# Patient Record
Sex: FEMALE | Race: WHITE | HISPANIC OR LATINO | Employment: UNEMPLOYED | URBAN - METROPOLITAN AREA
[De-identification: names, ages, dates, MRNs, and addresses within clinical notes are randomized per-mention and may not be internally consistent; named-entity substitution may affect disease eponyms.]

---

## 2022-03-23 ENCOUNTER — OFFICE VISIT (OUTPATIENT)
Dept: OBGYN CLINIC | Facility: CLINIC | Age: 32
End: 2022-03-23

## 2022-03-23 ENCOUNTER — APPOINTMENT (OUTPATIENT)
Dept: LAB | Facility: CLINIC | Age: 32
End: 2022-03-23
Payer: COMMERCIAL

## 2022-03-23 VITALS — SYSTOLIC BLOOD PRESSURE: 144 MMHG | DIASTOLIC BLOOD PRESSURE: 90 MMHG | HEART RATE: 80 BPM | WEIGHT: 250.6 LBS

## 2022-03-23 DIAGNOSIS — Z01.419 ENCOUNTER FOR GYNECOLOGICAL EXAMINATION (GENERAL) (ROUTINE) WITHOUT ABNORMAL FINDINGS: Primary | ICD-10-CM

## 2022-03-23 DIAGNOSIS — Z20.2 POSSIBLE EXPOSURE TO STD: ICD-10-CM

## 2022-03-23 DIAGNOSIS — Z12.4 CERVICAL CANCER SCREENING: ICD-10-CM

## 2022-03-23 LAB — HCV AB SER QL: NORMAL

## 2022-03-23 PROCEDURE — 86592 SYPHILIS TEST NON-TREP QUAL: CPT

## 2022-03-23 PROCEDURE — 86803 HEPATITIS C AB TEST: CPT

## 2022-03-23 PROCEDURE — 87491 CHLMYD TRACH DNA AMP PROBE: CPT | Performed by: OBSTETRICS & GYNECOLOGY

## 2022-03-23 PROCEDURE — G0476 HPV COMBO ASSAY CA SCREEN: HCPCS | Performed by: OBSTETRICS & GYNECOLOGY

## 2022-03-23 PROCEDURE — 87389 HIV-1 AG W/HIV-1&-2 AB AG IA: CPT

## 2022-03-23 PROCEDURE — 99395 PREV VISIT EST AGE 18-39: CPT | Performed by: OBSTETRICS & GYNECOLOGY

## 2022-03-23 PROCEDURE — 36415 COLL VENOUS BLD VENIPUNCTURE: CPT

## 2022-03-23 PROCEDURE — 87591 N.GONORRHOEAE DNA AMP PROB: CPT | Performed by: OBSTETRICS & GYNECOLOGY

## 2022-03-23 PROCEDURE — G0145 SCR C/V CYTO,THINLAYER,RESCR: HCPCS | Performed by: OBSTETRICS & GYNECOLOGY

## 2022-03-23 NOTE — PROGRESS NOTES
Assessment/Plan:     No problem-specific Assessment & Plan notes found for this encounter  Diagnoses and all orders for this visit:    Encounter for gynecological examination (general) (routine) without abnormal findings    Cervical cancer screening  -     Liquid-based pap, screening    Possible exposure to STD  -     Chlamydia/GC amplified DNA by PCR  -     HIV 1/2 ANTIGEN/ANTIBODY (4TH GENERATION) W REFLEX [facility]; Future  -     RPR; Future  -     Hepatitis C antibody; Future    Depression Screening Follow-up Plan: Patient's depression screening was positive with a PHQ-2 score of 0  Their PHQ-9 score was 1  Clinically patient does not have depression  No treatment is required  She will establish care with FP; recheck BP  Subjective:      Patient ID: Jojo Tapia is a 32 y o  female presents for annual exam   Her last pap ws about 3 years ago and she reports it as normal   She is  and is not using birth control  She declines birth control  She desires STD testing  Menses are regular  HPI    The following portions of the patient's history were reviewed and updated as appropriate: allergies, current medications, past family history, past medical history, past social history, past surgical history and problem list     Review of Systems      Objective:      /90   Pulse 80   Wt 114 kg (250 lb 9 6 oz)   LMP 03/02/2022 (Approximate)          Physical Exam  Vitals and nursing note reviewed  Exam conducted with a chaperone present  Constitutional:       General: She is not in acute distress  Appearance: She is well-developed  HENT:      Head: Normocephalic  Neck:      Thyroid: No thyromegaly  Cardiovascular:      Rate and Rhythm: Normal rate and regular rhythm  Heart sounds: Normal heart sounds  No murmur heard  Pulmonary:      Effort: Pulmonary effort is normal  No respiratory distress  Breath sounds: Normal breath sounds   No wheezing or rales    Chest:      Chest wall: No tenderness  Breasts:      Right: No swelling, bleeding, inverted nipple, mass, nipple discharge, skin change or tenderness  Left: No swelling, bleeding, inverted nipple, mass, nipple discharge, skin change or tenderness  Abdominal:      General: Bowel sounds are normal  There is no distension  Palpations: Abdomen is soft  There is no mass  Tenderness: There is no abdominal tenderness  There is no guarding or rebound  Genitourinary:     Labia:         Right: No rash, tenderness, lesion or injury  Left: No rash, tenderness, lesion or injury  Cervix: No cervical motion tenderness, discharge or friability  Adnexa:         Right: No mass, tenderness or fullness  Left: No mass, tenderness or fullness  Rectum: No external hemorrhoid  Skin:     General: Skin is warm and dry  Neurological:      Mental Status: She is alert and oriented to person, place, and time  Psychiatric:         Behavior: Behavior normal          Thought Content:  Thought content normal          Judgment: Judgment normal

## 2022-03-24 LAB
C TRACH DNA SPEC QL NAA+PROBE: NEGATIVE
HIV 1+2 AB+HIV1 P24 AG SERPL QL IA: NORMAL
HPV HR 12 DNA CVX QL NAA+PROBE: NEGATIVE
HPV16 DNA CVX QL NAA+PROBE: NEGATIVE
HPV18 DNA CVX QL NAA+PROBE: NEGATIVE
N GONORRHOEA DNA SPEC QL NAA+PROBE: NEGATIVE
RPR SER QL: NORMAL

## 2022-03-29 LAB
LAB AP GYN PRIMARY INTERPRETATION: NORMAL
Lab: NORMAL
PATH INTERP SPEC-IMP: NORMAL

## 2023-02-20 ENCOUNTER — APPOINTMENT (OUTPATIENT)
Dept: LAB | Facility: CLINIC | Age: 33
End: 2023-02-20

## 2023-02-20 ENCOUNTER — OFFICE VISIT (OUTPATIENT)
Dept: FAMILY MEDICINE CLINIC | Facility: CLINIC | Age: 33
End: 2023-02-20

## 2023-02-20 VITALS
DIASTOLIC BLOOD PRESSURE: 94 MMHG | HEART RATE: 77 BPM | OXYGEN SATURATION: 98 % | WEIGHT: 238 LBS | RESPIRATION RATE: 18 BRPM | TEMPERATURE: 98 F | SYSTOLIC BLOOD PRESSURE: 142 MMHG | HEIGHT: 67 IN | BODY MASS INDEX: 37.35 KG/M2

## 2023-02-20 DIAGNOSIS — Z13.1 SCREENING FOR DIABETES MELLITUS (DM): ICD-10-CM

## 2023-02-20 DIAGNOSIS — I10 HYPERTENSION, UNSPECIFIED TYPE: Primary | ICD-10-CM

## 2023-02-20 DIAGNOSIS — R74.01 TRANSAMINITIS: ICD-10-CM

## 2023-02-20 DIAGNOSIS — N18.2 CKD (CHRONIC KIDNEY DISEASE) STAGE 2, GFR 60-89 ML/MIN: ICD-10-CM

## 2023-02-20 DIAGNOSIS — E66.9 OBESITY (BMI 30-39.9): ICD-10-CM

## 2023-02-20 RX ORDER — LISINOPRIL 10 MG/1
10 TABLET ORAL DAILY
Qty: 90 TABLET | Refills: 3 | Status: SHIPPED | OUTPATIENT
Start: 2023-02-20

## 2023-02-20 RX ORDER — BLOOD PRESSURE TEST KIT
KIT MISCELLANEOUS 2 TIMES DAILY
Qty: 1 KIT | Refills: 0 | Status: SHIPPED | OUTPATIENT
Start: 2023-02-20

## 2023-02-20 NOTE — ASSESSMENT & PLAN NOTE
Patient continues to have increasing her creatinine  with a GFR of 68% has CKD stage 2, unknown baselines since she only has a cmp from 11/2022 on record  Patient Dx with HTN, no hx of DM  Currently does not follows with nephrology  Avoid nephrotoxic medications  Recommended perform BMP and discuss at next visit     Will screen for DM

## 2023-02-20 NOTE — ASSESSMENT & PLAN NOTE
Assessment:   BMI 37 28, Overweight, obese  Goal: BMI between 18 5 to 25  Plan  Will refer the patient to weight management  Encourage the patient to loose at least 5 pounds before next visit  Counseled patient on the importance of working to achieve weight reduction goal  Discussed benefits of weight loss including prevention or control of comorbidities  Discussed role that balanced diet and daily activity play in weight reduction  Set up small attainable weight loss goals  Involve family, friends, and co-workers for support  Exercise should be 30 minutes 5 times weekly of moderate intensity activity, brisk walking acceptable  Recommended diet include mediterranean and DASH  Primary focus should be unprocessed foods, fresh fruits &  vegetables, plant based fats and protein, legumes, whole grain and nuts  Vegetables and fruit should make up 1/2 each meal  Limit red meats, fast food and eating out at restaurants

## 2023-02-20 NOTE — ASSESSMENT & PLAN NOTE
Assessment:   · Blood pressure at today’s office visit 142/94 mmHg, poor control  · Blood Pressure goal as per JNC-8 less than 140/90 mmHg,   · Currently not on medications   · EKG: none    Plan:   · Will start lisinopril 10 mg and reassess in 1 month with office visit  · BP goals reviewed with patient  · The patient was educated on the importance of medication compliance and to monitor her blood pressure at home daily in a quiet room; after resting for at least 5 minutes and to bring the logs at next visit  · Will recommend performing aerobic exercise for at least more than 3 times per week  · Will recommend sodium and fat reduction and  Increase  in fruit consumption      ·

## 2023-02-20 NOTE — PROGRESS NOTES
Name: Stephanie Espinosa      : 1990      MRN: [id number]  Encounter Provider: Cristina Girard MD  Encounter Date: 2023   Encounter department: [address] [place] [address]     1  Hypertension, unspecified type  Assessment & Plan:  Assessment:   · Blood pressure at today’s office visit 142/94 mmHg, poor control  · Blood Pressure goal as per JNC-8 less than 140/90 mmHg,   · Currently not on medications   · EKG: none    Plan:   · Will start lisinopril 10 mg and reassess in 1 month with office visit  · BP goals reviewed with patient  · The patient was educated on the importance of medication compliance and to monitor her blood pressure at home daily in a quiet room; after resting for at least 5 minutes and to bring the logs at next visit  · Will recommend performing aerobic exercise for at least more than 3 times per week  · Will recommend sodium and fat reduction and  Increase  in fruit consumption  ·       Orders:  -     lisinopril (ZESTRIL) 10 mg tablet; Take 1 tablet (10 mg total) by mouth daily  -     Blood Pressure KIT; Use 2 (two) times a day    2  CKD (chronic kidney disease) stage 2, GFR 60-89 ml/min  Assessment & Plan:  Patient continues to have increasing her creatinine  with a GFR of 68% has CKD stage 2, unknown baselines since she only has a cmp from 2022 on record  Patient Dx with HTN, no hx of DM  Currently does not follows with nephrology  Avoid nephrotoxic medications  Recommended perform BMP and discuss at next visit  Will screen for DM    Orders:  -     Comprehensive metabolic panel; Future    3  Screening for diabetes mellitus (DM)  -     Hemoglobin A1C; Future    4  Obesity (BMI 30-39  9)  Assessment & Plan:  Assessment:   BMI 37 28, Overweight, obese  Goal: BMI between 18 5 to 25       Plan  Will refer the patient to weight management  Encourage the patient to loose at least 5 pounds before next visit  Counseled patient on the importance of working to achieve weight reduction goal  Discussed benefits of weight loss including prevention or control of comorbidities  Discussed role that balanced diet and daily activity play in weight reduction  Set up small attainable weight loss goals  Involve family, friends, and co-workers for support  Exercise should be 30 minutes 5 times weekly of moderate intensity activity, brisk walking acceptable  Recommended diet include mediterranean and DASH  Primary focus should be unprocessed foods, fresh fruits &  vegetables, plant based fats and protein, legumes, whole grain and nuts  Vegetables and fruit should make up 1/2 each meal  Limit red meats, fast food and eating out at restaurants  Orders:  -     Ambulatory Referral to Weight Management; Future  -     Lipid Panel with Direct LDL reflex; Future    5  Transaminitis  -     Comprehensive metabolic panel; Future         Subjective      It was a pleasure to see Homa Oden is a 28 y o   female with PMH significant HTN, who presents to establish care and HTN follow up  Today's main complain:   1  None    Patient follow with Neurology for migraines/concussion hx after hitting her head with a metal rack back in September last year  Currently on butalbital, acetaminophen 600 mg prn and topiromate 25 mg daily  Compliant denies side effect will bring medications at her next visit  Denies unintentional weight loss, fever, chills, fatigue, weakness, rashes, blurred vision, nausea, palpitation, chest pain, SOB, abdominal pain, urinary changes, bowel changes, legs swelling/pain, sleep problems,  sick contacts or recent travel  Patient's medical conditions are stable unless noted otherwise above   Patient has not had any recent hospitalizations, or medical emergencies since last visit  Overall patient reports feeling well  Patient has no further complaints other than what is mentioned in the ROS   Drinks a glass of wine 2-3 times per week, Denies tobacco, illicit drug consumption, she is currently unemployed, lives with her        Review of Systems   Constitutional: Negative for activity change, appetite change, chills, fatigue and fever  HENT: Negative for congestion, ear pain, postnasal drip, rhinorrhea, sneezing and sore throat  Eyes: Negative for pain and visual disturbance  Respiratory: Negative for cough, chest tightness, shortness of breath and wheezing  Cardiovascular: Negative for chest pain, palpitations and leg swelling  Gastrointestinal: Negative for abdominal distention, abdominal pain, blood in stool, constipation, diarrhea, nausea and vomiting  Genitourinary: Negative for decreased urine volume, difficulty urinating, dyspareunia, dysuria, flank pain, hematuria, menstrual problem, pelvic pain, urgency, vaginal bleeding and vaginal discharge  Musculoskeletal: Negative for arthralgias, back pain and myalgias  Skin: Negative for color change and rash  Neurological: Negative for dizziness, seizures, syncope, weakness, light-headedness, numbness and headaches  Psychiatric/Behavioral: Negative for agitation, behavioral problems, decreased concentration, self-injury, sleep disturbance and suicidal ideas  The patient is not nervous/anxious  All other systems reviewed and are negative  No current outpatient medications on file prior to visit  Objective     /94 (BP Location: Left arm, Patient Position: Sitting, Cuff Size: Large)   Pulse 77   Temp 98 °F (36 7 °C) (Temporal)   Resp 18   Ht 5' 7" (1 702 m)   Wt 108 kg (238 lb)   LMP 02/04/2023 (Approximate)   SpO2 98%   BMI 37 28 kg/m²     Physical Exam  Vitals and nursing note reviewed  Constitutional:       General: She is awake  She is not in acute distress  Appearance: Normal appearance  She is well-developed and well-groomed  She is obese  She is not ill-appearing, toxic-appearing or diaphoretic     HENT: Head: Normocephalic and atraumatic  Nose: Nose normal       Mouth/Throat:      Mouth: Mucous membranes are moist       Pharynx: Oropharynx is clear  No oropharyngeal exudate or posterior oropharyngeal erythema  Eyes:      Extraocular Movements: Extraocular movements intact  Conjunctiva/sclera: Conjunctivae normal       Pupils: Pupils are equal, round, and reactive to light  Cardiovascular:      Rate and Rhythm: Normal rate and regular rhythm  No extrasystoles are present  Pulses: Normal pulses  Radial pulses are 2+ on the right side and 2+ on the left side  Heart sounds: Normal heart sounds, S1 normal and S2 normal    Pulmonary:      Effort: Pulmonary effort is normal       Breath sounds: Normal breath sounds and air entry  Abdominal:      General: Bowel sounds are normal       Palpations: Abdomen is soft  There is no mass  Tenderness: There is no abdominal tenderness  There is no right CVA tenderness, left CVA tenderness or guarding  Musculoskeletal:         General: Normal range of motion  Cervical back: Normal range of motion  Right lower leg: No edema  Left lower leg: No edema  Skin:     General: Skin is warm  Capillary Refill: Capillary refill takes less than 2 seconds  Coloration: Skin is not jaundiced or pale  Findings: No bruising, erythema, lesion or rash  Neurological:      General: No focal deficit present  Mental Status: She is alert  Psychiatric:         Behavior: Behavior is cooperative         Judie Cowden, MD

## 2023-02-21 LAB
ALBUMIN SERPL BCP-MCNC: 3.7 G/DL (ref 3.5–5)
ALP SERPL-CCNC: 100 U/L (ref 46–116)
ALT SERPL W P-5'-P-CCNC: 110 U/L (ref 12–78)
ANION GAP SERPL CALCULATED.3IONS-SCNC: 5 MMOL/L (ref 4–13)
AST SERPL W P-5'-P-CCNC: 44 U/L (ref 5–45)
BILIRUB SERPL-MCNC: 0.39 MG/DL (ref 0.2–1)
BUN SERPL-MCNC: 12 MG/DL (ref 5–25)
CALCIUM SERPL-MCNC: 10 MG/DL (ref 8.3–10.1)
CHLORIDE SERPL-SCNC: 105 MMOL/L (ref 96–108)
CHOLEST SERPL-MCNC: 192 MG/DL
CO2 SERPL-SCNC: 27 MMOL/L (ref 21–32)
CREAT SERPL-MCNC: 0.86 MG/DL (ref 0.6–1.3)
GFR SERPL CREATININE-BSD FRML MDRD: 89 ML/MIN/1.73SQ M
GLUCOSE P FAST SERPL-MCNC: 102 MG/DL (ref 65–99)
HDLC SERPL-MCNC: 39 MG/DL
LDLC SERPL CALC-MCNC: 137 MG/DL (ref 0–100)
POTASSIUM SERPL-SCNC: 3.9 MMOL/L (ref 3.5–5.3)
PROT SERPL-MCNC: 8 G/DL (ref 6.4–8.4)
SODIUM SERPL-SCNC: 137 MMOL/L (ref 135–147)
TRIGL SERPL-MCNC: 80 MG/DL

## 2023-02-22 LAB
EST. AVERAGE GLUCOSE BLD GHB EST-MCNC: 117 MG/DL
HBA1C MFR BLD: 5.7 %

## 2023-02-27 ENCOUNTER — TELEPHONE (OUTPATIENT)
Dept: FAMILY MEDICINE CLINIC | Facility: CLINIC | Age: 33
End: 2023-02-27

## 2023-02-27 NOTE — TELEPHONE ENCOUNTER
Pt called the nurse line stating she had an appt on 2/20/23 with PCP, and would like to know what is going on with the referral      Called pt an advise to please allow at least 2-3 weeks

## 2023-03-17 ENCOUNTER — TELEPHONE (OUTPATIENT)
Dept: FAMILY MEDICINE CLINIC | Facility: CLINIC | Age: 33
End: 2023-03-17

## 2023-03-17 NOTE — TELEPHONE ENCOUNTER
aMrly Luther, soy la especialista de referrido en [facility]   Estoy contactandola porque el doctor puso un referrido para weight management en 2/20/2023/  Unfortunadamente, darío valerie no puedo programarla por usted ya que weigh management officina tiene que preguntarle especificas preguntas  Por favor llame al [phone] para programar jerome valerie       [address] [place]   Referral Team [place]

## 2023-03-20 ENCOUNTER — OFFICE VISIT (OUTPATIENT)
Dept: FAMILY MEDICINE CLINIC | Facility: CLINIC | Age: 33
End: 2023-03-20

## 2023-03-20 VITALS
TEMPERATURE: 97.6 F | DIASTOLIC BLOOD PRESSURE: 76 MMHG | HEIGHT: 67 IN | SYSTOLIC BLOOD PRESSURE: 122 MMHG | OXYGEN SATURATION: 98 % | HEART RATE: 72 BPM | WEIGHT: 237 LBS | BODY MASS INDEX: 37.2 KG/M2 | RESPIRATION RATE: 18 BRPM

## 2023-03-20 DIAGNOSIS — E66.9 OBESITY (BMI 30-39.9): ICD-10-CM

## 2023-03-20 DIAGNOSIS — D50.9 IRON DEFICIENCY ANEMIA, UNSPECIFIED IRON DEFICIENCY ANEMIA TYPE: ICD-10-CM

## 2023-03-20 DIAGNOSIS — D64.9 ANEMIA, UNSPECIFIED TYPE: Primary | ICD-10-CM

## 2023-03-20 DIAGNOSIS — I10 HYPERTENSION, UNSPECIFIED TYPE: ICD-10-CM

## 2023-03-20 NOTE — PROGRESS NOTES
Name: Sergo Moreno      : 1990      MRN: [id number]  Encounter Provider: Sascha Cisse MD  Encounter Date: 3/20/2023   Encounter department: [address] [place] [address]     1  Anemia, unspecified type  -     Iron Panel (Includes Ferritin, Iron Sat%, Iron, and TIBC); Future  -     TSH + Free T4; Future    2  Obesity (BMI 30-39 9)  -     Ambulatory Referral to Weight Management; Future  -     metFORMIN (GLUCOPHAGE) 500 mg tablet; Take 1 tablet (500 mg total) by mouth 2 (two) times a day with meals    3  Hypertension, unspecified type  Assessment & Plan:  Assessment:   • Blood pressure at today’s office visit 122/76 mmHg, control  • Blood Pressure goal as per JNC-8 less than 140/90 mmHg,   • Currently not on medications, prescribed lisinopril 10 mg qd at her last visit  Patient reports that her blood pressure has reminded stable despite not taking the medication  • EKG: none     Plan:   • D/C lisinopril, continue to monitor BP at home and reassess at next visit  • BP goals reviewed with patient  • Recommended performing aerobic exercise for at least more than 3 times per week, as well as low sodium and fat reduction  • To Increase  in fruit consumption  4  Iron deficiency anemia, unspecified iron deficiency anemia type  Assessment & Plan:  Assessment   Pateint hemodynamically stable, asymptomatic with no active bleeding  Per last CBC on 2023, microcytic anemia  Hemoglobin @ ^11g/dl     Plan   Continue to monitor for symptoms  Call the office/ED precautions if active bleeding  Will ordr iron panel for further evaluation  Treat as necessary       BMI Counseling: Body mass index is 37 12 kg/m²   The BMI is above normal  Nutrition recommendations include decreasing portion sizes, encouraging healthy choices of fruits and vegetables, decreasing fast food intake, consuming healthier snacks, limiting drinks that contain sugar, moderation in carbohydrate intake, increasing intake of lean protein, reducing intake of saturated and trans fat and reducing intake of cholesterol  Exercise recommendations include moderate physical activity 150 minutes/week and exercising 3-5 times per week  Rationale for BMI follow-up plan is due to patient being overweight or obese  Subjective      It was a pleasure to see Homa Aguilar is a 28 y o   female with PMH significant for:     CKD (chronic kidney disease) stage 2, GFR 60-89 ml/min     Obesity (BMI 30-39  9)     Hypertension     Iron deficiency anemia  who presents for HTN follow up and management of her chronic medical condition(s)     Today's main complain:   1  none  Denies unintentional weight loss, fever, chills, fatigue, weakness, headaches, dizziness, rashes, blurred vision, nausea, palpitation, chest pain, SOB, abdominal pain, urinary changes, bowel changes, legs swelling/pain, sleep problems,  sick contacts or recent travel  Patient's medical conditions are stable unless noted otherwise above   Patient has not had any recent hospitalizations, or medical emergencies since last visit  Patient reports compliance with her medications  Overall patient reports feeling well  Patient has no further complaints other than what is mentioned in the ROS  Review of Systems   Constitutional: Negative for activity change, appetite change, chills, fatigue and fever  HENT: Negative for congestion, postnasal drip, rhinorrhea, sneezing and sore throat  Eyes: Negative for visual disturbance  Respiratory: Negative for cough, chest tightness, shortness of breath and wheezing  Cardiovascular: Negative for chest pain, palpitations and leg swelling  Gastrointestinal: Negative for abdominal distention, abdominal pain, blood in stool, constipation, diarrhea, nausea and vomiting  Endocrine: Negative for polydipsia and polyphagia     Genitourinary: Negative for decreased urine volume, difficulty urinating, dysuria, enuresis, flank pain, hematuria, menstrual problem, vaginal bleeding and vaginal discharge  Musculoskeletal: Negative for arthralgias and myalgias  Skin: Negative for rash  Neurological: Negative for dizziness, syncope, weakness, light-headedness and numbness  Psychiatric/Behavioral: Negative for agitation, behavioral problems and suicidal ideas  Current Outpatient Medications on File Prior to Visit   Medication Sig   • Blood Pressure KIT Use 2 (two) times a day       Objective     /76 (BP Location: Left arm, Patient Position: Sitting, Cuff Size: Large)   Pulse 72   Temp 97 6 °F (36 4 °C) (Temporal)   Resp 18   Ht 5' 7" (1 702 m)   Wt 108 kg (237 lb)   SpO2 98%   BMI 37 12 kg/m²     Physical Exam  Vitals and nursing note reviewed  Constitutional:       General: She is awake  She is not in acute distress  Appearance: Normal appearance  She is well-developed and well-groomed  She is not ill-appearing, toxic-appearing or diaphoretic  HENT:      Head: Normocephalic and atraumatic  Nose: Nose normal       Mouth/Throat:      Mouth: Mucous membranes are moist       Pharynx: Oropharynx is clear  No oropharyngeal exudate or posterior oropharyngeal erythema  Eyes:      Extraocular Movements: Extraocular movements intact  Conjunctiva/sclera: Conjunctivae normal       Pupils: Pupils are equal, round, and reactive to light  Cardiovascular:      Rate and Rhythm: Normal rate and regular rhythm  No extrasystoles are present  Pulses: Normal pulses  Radial pulses are 2+ on the right side and 2+ on the left side  Heart sounds: Normal heart sounds, S1 normal and S2 normal    Pulmonary:      Effort: Pulmonary effort is normal       Breath sounds: Normal breath sounds and air entry  Abdominal:      General: Bowel sounds are normal       Palpations: Abdomen is soft  There is no mass  Tenderness: There is no abdominal tenderness   There is no right CVA tenderness, left CVA tenderness or guarding  Musculoskeletal:         General: Normal range of motion  Cervical back: Normal range of motion  Right lower leg: No edema  Left lower leg: No edema  Skin:     General: Skin is warm  Capillary Refill: Capillary refill takes less than 2 seconds  Coloration: Skin is not jaundiced or pale  Findings: No bruising, erythema, lesion or rash  Neurological:      General: No focal deficit present  Mental Status: She is alert  Psychiatric:         Behavior: Behavior is cooperative         Marlene Davis MD

## 2023-03-20 NOTE — PATIENT INSTRUCTIONS
Control del peso   LO QUE NECESITA SABER:   Tener sobrepeso aumenta jerome riesgo de presentar problemas de reina maikol enfermedades del corazón, hipertensión, diabetes tipo 2 y ciertos tipos de cáncer  También puede aumentar jerome riesgo de presentar osteoartritis, apnea del sueño y otros problemas respiratorios  Trate de bajar de peso de forma gradual y [place]  Incluso roberto mínima pérdida de peso puede disminuir jerome riesgo de problemas de [place]  INSTRUCCIONES SOBRE EL RADHA HOSPITALARIA:   Cómo bajar de peso de [place] forma arce: Mabelene Filbert forma arce y saludable para perder peso es consumir menos calorías y realizar roberto actividad física en forma regular  Usted puede perder hasta 1 aron por semana al reducir el consumo de 500 calorías cada día  Usted puede reducir el consumo de calorías al comer porciones más pequeñas o eliminar los alimentos con alto contenido de calorías  Kylie las etiquetas para determinar la cantidad de calorías que contienen los alimentos que consume  También puede quemar calorías al realizar ejercicio maikol caminar, nadar o montar en bicicleta  Es más probable que usted [employer] peso si hace de estos cambios parte de jerome estilo de milla  Realice roberto actividad física por lo menos 30 minutos al día, la mayoría de los días de la [place]  Usted también puede realizar más actividad física usando las escaleras en vez de los ascensores o estacionarse más lejos cuando Anne Baba a las tiendas  Pregunte a jerome médico acerca del mejor plan de ejercicio para usted  Plan de alimentación saludable para el control del peso: Un plan de alimentación saludable incluye roberto variedad de alimentos, contiene más pocas calorías y lo ayuda a estar saludable  Un plan de alimentación saludable incluye lo siguiente:     Consuma alimentos de grano integral con más frecuencia  Un plan de alimentación saludable debe contener alimentos con fibra   La fibra es la parte de las frutas, verduras y granos que jerome cuerpo no puede digerir  Los alimentos de granos integrales son saludables y suministran fibra adicional a jerome [place]  Algunos ejemplos de alimentos de granos integrales son los panes integrales, pastas integrales, la nora, el arroz integral y trey de bulgur  Consuma roberto variedad de verduras todos los días  [address], coliflor, col aye y [place]  Coma verduras anaranjadas maikol las zanahorias, brett dulces y calabaza de invierno  Consuma roberto variedad de frutas todos los [address]  Escoja frutas frescas o enlatadas en jerome propio jugo o con jugo bajo en [place] en vez de jugo  El [place] de frutas tiene [address] o irene nada de [place]  Consuma productos lácteos con bajo contenido de [place]  [place] [address] de 1%  Consuma yogur descremado y requesón (cottage) semidescremado  Trate de consumir quesos descremados maikol el queso mozzarela y otros quesos semidescremado  Seleccione robinson y otros alimentos con proteínas bajos en grasa  Escoja frijoles u [address]  Seleccione pescado, carne de aves sin piel (maikol el jaida o [place]), ndiaye de carne [place] (de res o de cerdo)  Antes de cocinar las robinson o las aves zoe cualquier parte de grasa visible  Use menos grasas y aceites  Trate de hornear los alimentos en lugar de freírlos  Trate de hornear los alimentos en lugar de freírlos  Consuma menos alimentos de alto tenor graso  Coma menos alimentos altos en grasa maikol las brett fritas, donas, helados y pasteles  Consuma menos dulces  Limite los alimentos y las bebidas con un gran contenido de azúcar  Estos incluyen los caramelos, galletas, gaseosas normales y bebidas endulzadas  Formas de reducir las calorías:  Reduzca el tamaño de las porciones  Use platos pequeños para servirse porciones pequeñas  No coma segundas porciones      Cuando coma en un restaurante, pida roberto [employer] y guarde en alirio la mitad de la comida antes Octaviano Drones a comer     Comparta con alguien un plato de entrada  Reemplace los bocadillos o meriendas altos en calorías por los saludables de menos calorías  Escoja frutas frescas, verduras, galletas de arroz bajo en grasa o palomitas de maíz en lugar de comer brett fritas de paquete, nueces o dulces de chocolate  [place] agua o bebidas dietéticas en lugar de las endulzadas  No vaya al engel cuando tenga hambre  Usted podría ser más propenso a elegir alimentos no saludables  Lleve roberto lista de alimentos saludables y vaya de compras después de mary ann comido  Coma edward comidas regularmente  No se salte ninguna comida  No omita ninguna comida porque esto puede conducir a comer más a roberto hora más tarde del día  [place] podría traerle problemas para perder peso  Si no tiene tiempo para hacer comidas regulares, consuma un refrigerio saludable  Hable con un dietista para que lo ayude a crear un plan de comidas y un horario que fitz adecuados para usted  Otras cosas que debe tener en cuenta mientras trata de bajar de peso:  Esté consciente de las situaciones que podrían ocasionarle ganas de comer en exceso, maikol el comer mientras jl la televisión  Busque formas para evitar estas situaciones  Por ejemplo, leer un libro, caminar o hacer trabajos manuales  Reúnase con un brian de [de-identified] o con personas que también están tratando de bajar de [place]  [place] le puede ayudar a mantenerse motivado y continuar progresando en jerome objetivo de perder peso  © Copyright Krishanilda Willis 2022 Information is for End User's use only and may not be sold, redistributed or otherwise used for commercial purposes  Esta información es sólo para uso en educación  Jerome intención no es darle un consejo médico sobre enfermedades o tratamientos  Colsulte con jerome Keisha Araceli farmacéutico antes de seguir cualquier régimen médico para saber si es seguro y efectivo para usted

## 2023-03-22 NOTE — ASSESSMENT & PLAN NOTE
Assessment   Pateint hemodynamically stable, asymptomatic with no active bleeding  Per last CBC on 2/2023, microcytic anemia  Hemoglobin @ ^11g/dl     Plan   Continue to monitor for symptoms  Call the office/ED precautions if active bleeding  Will ordr iron panel for further evaluation     Treat as necessary

## 2023-03-22 NOTE — ASSESSMENT & PLAN NOTE
Assessment:   • Blood pressure at today’s office visit 122/76 mmHg, control  • Blood Pressure goal as per JNC-8 less than 140/90 mmHg,   • Currently not on medications, prescribed lisinopril 10 mg qd at her last visit  Patient reports that her blood pressure has reminded stable despite not taking the medication  • EKG: none     Plan:   • D/C lisinopril, continue to monitor BP at home and reassess at next visit  • BP goals reviewed with patient  • Recommended performing aerobic exercise for at least more than 3 times per week, as well as low sodium and fat reduction  • To Increase  in fruit consumption

## 2023-03-24 ENCOUNTER — APPOINTMENT (OUTPATIENT)
Dept: LAB | Facility: CLINIC | Age: 33
End: 2023-03-24

## 2023-03-24 DIAGNOSIS — D64.9 ANEMIA, UNSPECIFIED TYPE: ICD-10-CM

## 2023-03-24 LAB
FERRITIN SERPL-MCNC: 14 NG/ML (ref 8–388)
IRON SATN MFR SERPL: 8 % (ref 15–50)
IRON SERPL-MCNC: 30 UG/DL (ref 50–170)
T4 FREE SERPL-MCNC: 1.03 NG/DL (ref 0.76–1.46)
TIBC SERPL-MCNC: 356 UG/DL (ref 250–450)
TSH SERPL DL<=0.05 MIU/L-ACNC: 4.26 UIU/ML (ref 0.45–4.5)

## 2023-03-27 ENCOUNTER — ANNUAL EXAM (OUTPATIENT)
Dept: OBGYN CLINIC | Facility: CLINIC | Age: 33
End: 2023-03-27

## 2023-03-27 ENCOUNTER — APPOINTMENT (OUTPATIENT)
Dept: LAB | Facility: CLINIC | Age: 33
End: 2023-03-27

## 2023-03-27 ENCOUNTER — TELEPHONE (OUTPATIENT)
Dept: OBGYN CLINIC | Facility: CLINIC | Age: 33
End: 2023-03-27

## 2023-03-27 VITALS
DIASTOLIC BLOOD PRESSURE: 102 MMHG | SYSTOLIC BLOOD PRESSURE: 150 MMHG | WEIGHT: 238.6 LBS | BODY MASS INDEX: 37.45 KG/M2 | HEIGHT: 67 IN | HEART RATE: 89 BPM

## 2023-03-27 DIAGNOSIS — N89.8 VAGINAL DISCHARGE: ICD-10-CM

## 2023-03-27 DIAGNOSIS — Z01.419 ENCOUNTER FOR GYNECOLOGICAL EXAMINATION (GENERAL) (ROUTINE) WITHOUT ABNORMAL FINDINGS: Primary | ICD-10-CM

## 2023-03-27 DIAGNOSIS — Z20.2 POSSIBLE EXPOSURE TO STD: ICD-10-CM

## 2023-03-27 RX ORDER — FLUCONAZOLE 150 MG/1
150 TABLET ORAL ONCE
Qty: 1 TABLET | Refills: 1 | Status: SHIPPED | OUTPATIENT
Start: 2023-03-27 | End: 2023-03-27

## 2023-03-27 NOTE — PROGRESS NOTES
"Assessment/Plan:     No problem-specific Assessment & Plan notes found for this encounter  Diagnoses and all orders for this visit:    Encounter for gynecological examination (general) (routine) without abnormal findings    Possible exposure to STD  -     Chlamydia/GC amplified DNA by PCR  -     HIV 1/2 AG/AB W REFLEX [facility] only; Future  -     RPR-Syphilis Screening (Total Syphilis IGG/IGM); Future  -     Hepatitis C antibody; Future    Vaginal discharge  -     fluconazole (DIFLUCAN) 150 mg tablet; Take 1 tablet (150 mg total) by mouth once for 1 dose    Depression Screening Follow-up Plan: Patient's depression screening was positive with a PHQ-2 score of 2  Their PHQ-9 score was 7  Clinically patient does not have depression  No treatment is required  BMI Counseling: Body mass index is 37 37 kg/m²  The BMI is above normal  Patient referred to PCP due to patient being obese  Her blood pressure was increased today  She was referred to her PCP downstairs for further evaluation  Subjective:      Patient ID: Katrina Dominguez is a 28 y o  female who presents for annual exam   She c/o white discharge on and off for 2 months  She desires STD testing  Her last pap was   03/23/22 and was negative with negative HPV  She declines birth control  HPI    The following portions of the patient's history were reviewed and updated as appropriate: allergies, current medications, past family history, past medical history, past social history, past surgical history and problem list     Review of Systems      Objective:      BP (!) 150/102   Pulse 89   Ht 5' 7\" (1 702 m)   Wt 108 kg (238 lb 9 6 oz)   LMP 03/18/2023 (Exact Date)   BMI 37 37 kg/m²          Physical Exam  Vitals and nursing note reviewed  Exam conducted with a chaperone present  Constitutional:       General: She is not in acute distress  Appearance: She is well-developed  HENT:      Head: Normocephalic     Neck: " Thyroid: No thyromegaly  Cardiovascular:      Rate and Rhythm: Normal rate and regular rhythm  Heart sounds: Normal heart sounds  No murmur heard  Pulmonary:      Effort: Pulmonary effort is normal  No respiratory distress  Breath sounds: Normal breath sounds  No wheezing or rales  Chest:      Chest wall: No tenderness  Breasts:     Right: No swelling, bleeding, inverted nipple, mass, nipple discharge, skin change or tenderness  Left: No swelling, bleeding, inverted nipple, mass, nipple discharge, skin change or tenderness  Abdominal:      General: Bowel sounds are normal  There is no distension  Palpations: Abdomen is soft  There is no mass  Tenderness: There is no abdominal tenderness  There is no guarding or rebound  Genitourinary:     Labia:         Right: No rash, tenderness, lesion or injury  Left: No rash, tenderness, lesion or injury  Vagina: No signs of injury and foreign body  Vaginal discharge and erythema present  No tenderness, bleeding, lesions or prolapsed vaginal walls  Cervix: Normal       Uterus: Normal        Adnexa:         Right: No mass, tenderness or fullness  Left: No mass, tenderness or fullness  Rectum: No external hemorrhoid  Skin:     General: Skin is warm and dry  Neurological:      Mental Status: She is alert and oriented to person, place, and time  Psychiatric:         Behavior: Behavior normal          Thought Content:  Thought content normal          Judgment: Judgment normal

## 2023-03-28 LAB
HCV AB SER QL: NORMAL
HIV 1+2 AB+HIV1 P24 AG SERPL QL IA: NORMAL
HIV 2 AB SERPL QL IA: NORMAL
HIV1 AB SERPL QL IA: NORMAL
HIV1 P24 AG SERPL QL IA: NORMAL
TREPONEMA PALLIDUM IGG+IGM AB [PRESENCE] IN SERUM OR PLASMA BY IMMUNOASSAY: NORMAL

## 2023-03-29 ENCOUNTER — TELEPHONE (OUTPATIENT)
Dept: OBGYN CLINIC | Facility: CLINIC | Age: 33
End: 2023-03-29

## 2023-03-29 DIAGNOSIS — D50.9 IRON DEFICIENCY ANEMIA, UNSPECIFIED IRON DEFICIENCY ANEMIA TYPE: Primary | ICD-10-CM

## 2023-03-29 LAB
C TRACH DNA SPEC QL NAA+PROBE: NEGATIVE
N GONORRHOEA DNA SPEC QL NAA+PROBE: NEGATIVE

## 2023-03-29 RX ORDER — FERROUS SULFATE TAB EC 324 MG (65 MG FE EQUIVALENT) 324 (65 FE) MG
324 TABLET DELAYED RESPONSE ORAL
Qty: 30 TABLET | Refills: 0 | Status: SHIPPED | OUTPATIENT
Start: 2023-03-29 | End: 2023-04-28

## 2023-03-30 DIAGNOSIS — N89.8 VAGINAL DISCHARGE: Primary | ICD-10-CM

## 2023-04-20 PROBLEM — F32.A DEPRESSION: Status: ACTIVE | Noted: 2023-04-20

## 2023-04-24 ENCOUNTER — TELEPHONE (OUTPATIENT)
Dept: PSYCHIATRY | Facility: CLINIC | Age: 33
End: 2023-04-24

## 2023-04-24 NOTE — TELEPHONE ENCOUNTER
Patient has been added to the ReferralMedication Management wait list     Confirmed Insurance: Yes []  [facility]  Provider Preference: Female  Virtual: Yes [] No [x]

## 2023-04-25 ENCOUNTER — PATIENT OUTREACH (OUTPATIENT)
Dept: FAMILY MEDICINE CLINIC | Facility: CLINIC | Age: 33
End: 2023-04-25

## 2023-04-25 DIAGNOSIS — Z78.9 NEEDS ASSISTANCE WITH COMMUNITY RESOURCES: Primary | ICD-10-CM

## 2023-04-25 NOTE — PROGRESS NOTES
ANETA CASILLAS received consult from Provider, regarding pt is having financial difficulty and need help applying for SNAP benefit  ANETA CASILLAS placed call to pt to follow-up and further assess  ANETA SONJA introduced self and role  Pt reports she is having financial difficulties, she is not working and has not worked for 6 months  Pt states after the head insurance she has not been able to work  Pt reports she is taking PT at this time  Pt report her  is the only one working at this time, and they are behind on their mortgage payments and bills  Pt reports they are behind 2 months on their mortgage  ANETA CASILLAS suggested following up with the bank to see if the could get on a payment plan or late fees  Pt states her  has been talking to the bank about the same  Pt states it has been difficult for her  to keep up with all the bills/payments  Pt reports they are behind on their utilites bills as well  ANETA CASILLAS inquire if they has apply for any assistance such LIHEAP, On-Track, CAP or SNAP benefits  Pt states she applied for SNAP benefit and they submit her 's proof of income but they still haven't heard anything for DP[employer]  Pt denies applying for other program to assist with the utilities  ANETA SONJA informs pt she can apply for [facility] and CAP program, however, [facility] last day in April 28th so ANETA CASILLAS will place a [facility] referral to assist her apply before Friday  Pt verbalized understanding and thanked ANETA CORNELL CM informs pt there is no funding available for the water bill or the mortgage but she can reach out to [employer] to inquire if they has funding available  Pt ask for [employer]  ANETA SONJA agrees to email pt [facility] information and food back  Pt states he goes to her kids's school last [de-identified] of month for food donation  Informs pt there are other food back she can go in the community  Pt verbalized understanding       ANETA CASILLAS emailed pt to [email or web address]  [facility] referral placed   SW CM will remains available and will continue to follow-up

## 2023-04-26 ENCOUNTER — PATIENT OUTREACH (OUTPATIENT)
Dept: FAMILY MEDICINE CLINIC | Facility: CLINIC | Age: 33
End: 2023-04-26

## 2023-04-26 NOTE — PROGRESS NOTES
Outgoing Call:  4/26/2023    [facility] called pt to discuss referral received from , [address] pt is interested in applying for [employer], CAP, and [place]  [facility] introduced herself and her role  CH[facility] assessment was completed and pt agreed to services  [facility] informed pt of what she will need to bring to complete said applications  Pt is also aware LIHEAP ends this Friday  Pt agreed to meet and complete applications       Next outreach is scheduled for 4/28/2023 at [facility] [employer] [facility] [place]

## 2023-04-28 ENCOUNTER — PATIENT OUTREACH (OUTPATIENT)
Dept: FAMILY MEDICINE CLINIC | Facility: CLINIC | Age: 33
End: 2023-04-28

## 2023-04-28 ENCOUNTER — TELEPHONE (OUTPATIENT)
Dept: FAMILY MEDICINE CLINIC | Facility: CLINIC | Age: 33
End: 2023-04-28

## 2023-04-28 NOTE — PROGRESS NOTES
In Person:  4/28/2023    [facility] did meet with pt as Ángel Julio for scheduled appointment  CMOC did apply for [facility] via [employer] with information provided by pt  Pt did bring with her all documents requested to complete application  CMOC completed applications for OnTrack and CAP with information provided by pt  Both applications emailed to agencies along with requested documents  CMOC assisted pt in calling [employer] to request status of SNAP and MA application  We were informed pt and  not approved for MA due to high income  Both children were approved for CHIP  Will receive notice in mail with insurance information as well as a referral for the [facility] program  Not approved for SNAP as income is too high  Pt would like to request a reconsideration  We were informed pt should wait until May, next week, and request this  Pt will need to provide all pay stubs for the month of April  Pt informs [employer] no longer has rental assistance funding  [facility] provided information to DYLAN Zhang  Pt agreed to call and ask if they can assist  Pt also asked for information on an agency that can help with immigration issues  [facility] informed she can call [employer] as they do have a program that may be able to assist  Pt thanked [facility] for her time  Next outreach is scheduled for 5/4/2023

## 2023-05-04 ENCOUNTER — PATIENT OUTREACH (OUTPATIENT)
Dept: FAMILY MEDICINE CLINIC | Facility: CLINIC | Age: 33
End: 2023-05-04

## 2023-05-04 ENCOUNTER — OFFICE VISIT (OUTPATIENT)
Dept: FAMILY MEDICINE CLINIC | Facility: CLINIC | Age: 33
End: 2023-05-04

## 2023-05-04 VITALS
OXYGEN SATURATION: 98 % | RESPIRATION RATE: 19 BRPM | SYSTOLIC BLOOD PRESSURE: 132 MMHG | DIASTOLIC BLOOD PRESSURE: 88 MMHG | HEART RATE: 96 BPM | WEIGHT: 229 LBS | TEMPERATURE: 97.7 F | BODY MASS INDEX: 35.87 KG/M2

## 2023-05-04 DIAGNOSIS — F32.A DEPRESSION, UNSPECIFIED DEPRESSION TYPE: Primary | ICD-10-CM

## 2023-05-04 DIAGNOSIS — N18.2 CKD (CHRONIC KIDNEY DISEASE) STAGE 2, GFR 60-89 ML/MIN: ICD-10-CM

## 2023-05-04 NOTE — PROGRESS NOTES
Name: Roly Watson      : 1990      MRN: [id number]  Encounter Provider: Mariangel Lara MD  Encounter Date: 2023   Encounter department: [place] [address]    Assessment & Plan     1  Depression, unspecified depression type  Assessment & Plan:    Assessment   PHQ 9 at last visit was 15 , mild depression  Symptoms has been present for the past 3 months   Patient denies suicidal/homicidal/plan ideations, patient also denies hallucinations, or delusions  Patient denies previous suicidal attempts  Currently not on medications  Patient was referred for psychological evaluation with possibly psychotherapy but reports not being able to schedule appointment due to language barrier  Antidepressants offered at her last visit, but patient refused as well as in this visit  Plan   -Referral team contacted to facilitate scheduling appointment with patient due to her language barrier    -Continue to encourage patient to start antidepressant, refused at today's visit as well     -ED precautions were given for new onset of suicidal/homocidal ideation              2  CKD (chronic kidney disease) stage 2, GFR 60-89 ml/min  Assessment & Plan:  Lab Results   Component Value Date    EGFR 93 2023    EGFR 89 2023    CREATININE 0 83 2023    CREATININE 0 86 2023       Assessment   - CKD stage 2 per most recent GFR    - Records revised in [facility] as well as Care everywhere   -Patient with hx of HTN and DM type 2    - Renal function remains stable at baseline  - Likely has underlying CKD secondary to plus hypertensive nephrosclerosis  - Acid base and lytes stable  - Clinically the patient appears to be euvolemic    Plan  - Recommend to avoid use of NSAIDs, nephrotoxins    - Caution advised with regards to exposure to IV contrast dye    - Discussed with the patient in depth her renal status, including the possible etiologies for CKD     - Advised the patient that when her GFR is close to 20mL/min possible starting on RRT(renal replacement therapy) options such as renal transplant, peritoneal dialysis and hemodialysis  - Discussed with the patient how we need to work together to delay the progression of CKD with optimal BP control based on their age and co-morbidities and trying to reduce proteinuria by the use of anti-proteinuric agents if needed  Subjective      It was a pleasure to see Homa Owens, a 28 y o   female who presents for Depression follow up     Depression:   -Patient reports has been unable to schedule appointment with a psychiatrist because of language barrier   -Referred to psychology for further evaluation, on a waiting list     -Patient was offered medication for her depression at her last visit, refused    -Still reports that is unwilling to take antidepressant at this visit     -Of note, head trauma while at work reported during last visit:          Patient states that at her last visit when she mentioned that hit her head while getting of of a car, was in reality getting off of the forklift     Overall patient reports feeling well with no further complaint(s) other than what is mentioned  Review of Systems   HENT: Negative for ear pain  Eyes: Negative for pain and visual disturbance  Respiratory: Negative for shortness of breath  Cardiovascular: Negative for palpitations  Genitourinary: Negative for dysuria and hematuria  Musculoskeletal: Negative for back pain  Skin: Negative for color change  Neurological: Negative for seizures and syncope  All other systems reviewed and are negative        Current Outpatient Medications on File Prior to Visit   Medication Sig   • Blood Pressure KIT Use 2 (two) times a day   • ferrous sulfate 324 (65 Fe) mg Take 1 tablet (324 mg total) by mouth daily before breakfast   • metFORMIN (GLUCOPHAGE) 500 mg tablet Take 1 tablet (500 mg total) by mouth 2 (two) times a day with meals (Patient not taking: Reported on 3/27/2023)   • miconazole (MONISTAT-7) 2 % vaginal cream Insert 1 applicator into the vagina daily at bedtime       Objective     /88 (BP Location: Left arm, Patient Position: Sitting, Cuff Size: Standard)   Pulse 96   Temp 97 7 °F (36 5 °C) (Temporal)   Resp 19   Wt 104 kg (229 lb)   LMP 04/13/2023 (Exact Date)   SpO2 98%   BMI 35 87 kg/m²     Physical Exam  Vitals and nursing note reviewed  Constitutional:       General: She is awake  She is not in acute distress  Appearance: Normal appearance  She is well-developed and well-groomed  She is not ill-appearing, toxic-appearing or diaphoretic  HENT:      Head: Normocephalic and atraumatic  Nose: Nose normal       Mouth/Throat:      Mouth: Mucous membranes are moist       Pharynx: Oropharynx is clear  No oropharyngeal exudate or posterior oropharyngeal erythema  Eyes:      Extraocular Movements: Extraocular movements intact  Conjunctiva/sclera: Conjunctivae normal       Pupils: Pupils are equal, round, and reactive to light  Cardiovascular:      Rate and Rhythm: Normal rate and regular rhythm  No extrasystoles are present  Pulses: Normal pulses  Radial pulses are 2+ on the right side and 2+ on the left side  Heart sounds: Normal heart sounds, S1 normal and S2 normal  No murmur heard  Pulmonary:      Effort: Pulmonary effort is normal  No respiratory distress  Breath sounds: Normal breath sounds and air entry  No wheezing  Abdominal:      General: Bowel sounds are normal       Palpations: Abdomen is soft  There is no mass  Tenderness: There is no abdominal tenderness  There is no right CVA tenderness, left CVA tenderness or guarding  Musculoskeletal:         General: Normal range of motion  Cervical back: Normal range of motion  Right lower leg: No edema  Left lower leg: No edema  Skin:     General: Skin is warm        Capillary Refill: Capillary refill takes less than 2 seconds  Coloration: Skin is not jaundiced or pale  Findings: No bruising, erythema, lesion or rash  Neurological:      General: No focal deficit present  Mental Status: She is alert and oriented to person, place, and time  Psychiatric:         Attention and Perception: Attention normal          Mood and Affect: Mood and affect normal          Speech: Speech normal          Behavior: Behavior normal  Behavior is cooperative  Thought Content: Thought content is not paranoid or delusional  Thought content does not include homicidal or suicidal ideation  Thought content does not include homicidal or suicidal plan         Apryl Rivera MD

## 2023-05-04 NOTE — PROGRESS NOTES
Outgoing Call / Email:  5/4/2023    [facility] emailed both Amy Siddiqi and NAN to request status of applications submitted  Chiquis responded and stated that pt was approved however she must end supplier services with [employer] ([phone]) in order to receive benefits  Waiting on response from CAP  [facility] checked on status of LIHEAP application via [employer]  Still pending  [facility] called pt and provided update via VM  Requested call back to also confirm that she wants to move forward with appeal for SNAP denial     Next outreach is scheduled for 5/11/2023

## 2023-05-04 NOTE — ASSESSMENT & PLAN NOTE
Assessment   PHQ 9 at last visit was 15 , mild depression  Symptoms has been present for the past 3 months   Patient denies suicidal/homicidal/plan ideations, patient also denies hallucinations, or delusions  Patient denies previous suicidal attempts  Currently not on medications  Patient was referred for psychological evaluation with possibly psychotherapy but reports not being able to schedule appointment due to language barrier  Antidepressants offered at her last visit, but patient refused as well as in this visit       Plan   -Referral team contacted to facilitate scheduling appointment with patient due to her language barrier    -Continue to encourage patient to start antidepressant, refused at today's visit as well     -ED precautions were given for new onset of suicidal/homocidal ideation

## 2023-05-08 NOTE — ASSESSMENT & PLAN NOTE
Lab Results   Component Value Date    EGFR 93 04/13/2023    EGFR 89 02/20/2023    CREATININE 0 83 04/13/2023    CREATININE 0 86 02/20/2023       Assessment   - CKD stage 2 per most recent GFR    - Records revised in [facility] as well as Care everywhere   -Patient with hx of HTN and DM type 2    - Renal function remains stable at baseline  - Likely has underlying CKD secondary to plus hypertensive nephrosclerosis  - Acid base and lytes stable  - Clinically the patient appears to be euvolemic    Plan  - Recommend to avoid use of NSAIDs, nephrotoxins    - Caution advised with regards to exposure to IV contrast dye    - Discussed with the patient in depth her renal status, including the possible etiologies for CKD  - Advised the patient that when her GFR is close to 20mL/min possible starting on RRT(renal replacement therapy) options such as renal transplant, peritoneal dialysis and hemodialysis  - Discussed with the patient how we need to work together to delay the progression of CKD with optimal BP control based on their age and co-morbidities and trying to reduce proteinuria by the use of anti-proteinuric agents if needed

## 2023-05-08 NOTE — ASSESSMENT & PLAN NOTE
Assessment:   • Blood pressure at today’s office visit 132/88 mmHg, slightly elevated  • Blood Pressure goal as per JNC-8 less than 130/90 mmHg,   • Previously on lisinopril 10 mg qd;  at her last visit was d/c, patient reproted that she was not being compliant with her medication    • EKG: none     Plan:   • Continue to monitor with an office nurse visit, if elevated restarted her on lisinopril,   • Continue to monitor BP at home and reassess at next visit     • BP goals reviewed with patient     • Recommended performing aerobic exercise for at least more than 3 times per week, as well as low sodium and fat reduction     • To Increase  in fruit consumption

## 2023-05-16 ENCOUNTER — PATIENT OUTREACH (OUTPATIENT)
Dept: FAMILY MEDICINE CLINIC | Facility: CLINIC | Age: 33
End: 2023-05-16

## 2023-05-16 NOTE — PROGRESS NOTES
Email/ Outgoing Call:  5/16/2023    [facility] completed a chart review  Checked on status of LIHEAP application via [employer] and website states application is still pending  [facility] received an email from Francisco at [facility] for [facility] informing that pt will need to drop her gas supplier services with [employer] if she wants to have CAP approved  Pt was informed last week she will also need to drop her electric supplier services with [employer] to qualify for OnTrack  [facility] called pt to provide an update  Pt states she received [facility]'s VM on 5/4/23 and cancelled her electric supplier  She has since receive a notice from [address] [place] [address] she will only need to pay $70 a month while on OnTrack program  Pt informed she will call [employer] sometime today or tomorrow and cancel services to get on the CAP program  She will provide confirmation number to [facility] once received  Pt informs her  worked more hours than expected last month and therefore is aware they will not qualify for SNAP benefits  She informed she will reconsider applying in June  [facility] informed she is able to go to the local office in person and apply when she is ready  Pt agreed  Pt is aware LIHEAP application can take up to 30 days for decision to be made by [facility]  Next outreach is scheduled for 5/23/2023

## 2023-05-26 ENCOUNTER — PATIENT OUTREACH (OUTPATIENT)
Dept: FAMILY MEDICINE CLINIC | Facility: CLINIC | Age: 33
End: 2023-05-26

## 2023-05-26 NOTE — PROGRESS NOTES
Outgoing Calls:  5/26/2023    CMOC called Roque Vieyra at [facility] to discuss pt's application for assistance with gas bill  CMOC was informed it does not appear pt has dropped her gas supplier which pt needs to do in order to be eligible for CAP  CMOC previously discussed this with pt and pt informed she will drop her supplier  CMOC was informed pt's application will be shredded by next week if she does not provide a confirmation number to prove she has canceled supplier services  [facility] checked on status of LIHEAP application via [employer] and status states pt was denied  [facility] called [employer] to inquire about denial and was informed pt exceeds income limit  [facility] called pt and updated her on LIHEAP and CAP applications  Pt expressed understanding  Pt states she has not been able to cancel her gas supplier but will complete this today and provide [facility] with a confirmation number  Pt mentioned she is confused by recent [employer] letter which states she owes $200 12  Previous bill states pt owes $829 22  [facility] facilitated a three way call to [facility] and inquired about letter  We were informed currently pt is on a payment plan and is expected to pay $100 06 a month however she is two month behind on payments  Also, current balance is $972  16  Pt expressed understanding  [facility] reminded pt to call back with confirmation number to complete CAP application  Next outreach is scheduled for 6/2/2023  Text / Outgoing Calls:  [facility] received a text message from pt with confirmation number that she has canceled services for gas provider  CMOC then called Roque Vieyra at [facility] and updated her on this  Roque Vieyra informed application can now be processed and pt will receive a call and a letter from her within two months  [facility] called pt and informed her it may take up to two billing cycles before supplier is taken off her UGI account  Pt thanked [facility] for her assistance and is aware this referral will be closed today  No further outreach is scheduled

## 2023-05-30 ENCOUNTER — PATIENT OUTREACH (OUTPATIENT)
Dept: FAMILY MEDICINE CLINIC | Facility: CLINIC | Age: 33
End: 2023-05-30

## 2023-05-30 NOTE — PROGRESS NOTES
ANETA CASILLAS received IB message from [facility] regarding assistance patient apply for SNAP benefit, LIHEAP, On-Track program and CAP  Pt was denied for [facility] and SNAP benefit due to her 's income is too high  Pt was approved for [employer]-Track  Pt was approved for a monthly payment of $70  Pt was also approved for CAP but will takes at least 2 billing [place] to start  Pt is aware she can apply for SNAP benefit if her 's income changed  This referral would be closed but ANETA CASILLAS and [facility] will remains available for any future consult as needed

## 2023-06-05 ENCOUNTER — OFFICE VISIT (OUTPATIENT)
Dept: FAMILY MEDICINE CLINIC | Facility: CLINIC | Age: 33
End: 2023-06-05

## 2023-06-05 VITALS
DIASTOLIC BLOOD PRESSURE: 90 MMHG | HEART RATE: 94 BPM | TEMPERATURE: 97.8 F | OXYGEN SATURATION: 99 % | SYSTOLIC BLOOD PRESSURE: 140 MMHG | WEIGHT: 227 LBS | HEIGHT: 67 IN | RESPIRATION RATE: 16 BRPM | BODY MASS INDEX: 35.63 KG/M2

## 2023-06-05 DIAGNOSIS — F32.A DEPRESSION, UNSPECIFIED DEPRESSION TYPE: Primary | ICD-10-CM

## 2023-06-05 DIAGNOSIS — E66.9 OBESITY (BMI 30-39.9): ICD-10-CM

## 2023-06-05 PROCEDURE — 99213 OFFICE O/P EST LOW 20 MIN: CPT | Performed by: FAMILY MEDICINE

## 2023-06-05 NOTE — ASSESSMENT & PLAN NOTE
Assessment:   BMI: 35 55, improve from previous 35 99, obese  Improving  Goal: BMI between 18 5 to 25  Previously metformin was prescribed, patient reports that is not compliant since gets anxious when taking medications  Reports increase of physical activity, walks with          Plan  D/C metformin  Counseled patient on the importance of working to achieve weight reduction goal    Discussed benefits of weight loss including prevention or control of comorbidities  Discussed role that balanced diet and daily activity play in weight reduction  Set up small attainable weight loss goals  Involve family, friends, and co-workers for support  Continue exercise should be 30 minutes 5 times weekly of moderate intensity activity, brisk walking acceptable  Recommended diet include mediterranean and DASH  Primary focus should be unprocessed foods, fresh fruits & vegetables, plant based fats and protein, legumes, whole grain and nuts   Vegetables and fruit should make up 1/2 each meal  Limit red

## 2023-06-05 NOTE — ASSESSMENT & PLAN NOTE
Assessment   Mild depression, stable not in acute distress   Symptoms has been present for the past 3 months   Patient denies suicidal/homicidal/plan ideations, patient also denies hallucinations, or delusions     Patient denies previous suicidal attempts  Currently not on medications, treatment offered but patient has been refusing for treatment  Patient was referred for psychological evaluation and amb psychiatry, report that her health insurance was cancelled on 5/31/2023, previously was unable to schedule appointment due to language barrier       Plan   Reassess in 2 months or earlier if needed    -Once again patient was encourage to touch base with psychology/psychiatric services  -ED precautions were given for new onset of suicidal/homocidal ideation

## 2023-06-05 NOTE — PROGRESS NOTES
Name: Emily Parra      : 1990      MRN: [id number]  Encounter Provider: Shelia Duffy MD  Encounter Date: 2023   Encounter department: [address] [place] [address]     1  Depression, unspecified depression type  Assessment & Plan:  Assessment   Mild depression, stable not in acute distress   Symptoms has been present for the past 3 months   Patient denies suicidal/homicidal/plan ideations, patient also denies hallucinations, or delusions     Patient denies previous suicidal attempts  Currently not on medications, treatment offered but patient has been refusing for treatment  Patient was referred for psychological evaluation and amb psychiatry, report that her health insurance was cancelled on 2023, previously was unable to schedule appointment due to language barrier       Plan   Reassess in 2 months or earlier if needed    -Once again patient was encourage to touch base with psychology/psychiatric services  -ED precautions were given for new onset of suicidal/homocidal ideation  2  Obesity (BMI 30-39  9)  Assessment & Plan:  Assessment:   BMI: 35 55, improve from previous 35 99, obese  Improving  Goal: BMI between 18 5 to 25  Previously metformin was prescribed, patient reports that is not compliant since gets anxious when taking medications  Reports increase of physical activity, walks with          Plan  D/C metformin  Counseled patient on the importance of working to achieve weight reduction goal    Discussed benefits of weight loss including prevention or control of comorbidities  Discussed role that balanced diet and daily activity play in weight reduction  Set up small attainable weight loss goals  Involve family, friends, and co-workers for support  Continue exercise should be 30 minutes 5 times weekly of moderate intensity activity, brisk walking acceptable  Recommended diet include mediterranean and DASH  Primary focus should be unprocessed foods, fresh fruits & vegetables, plant based fats and protein, legumes, whole grain and nuts  Vegetables and fruit should make up 1/2 each meal  Limit red            Subjective      It was a pleasure to see Homa Fuentes, a 28 y o   female who presents for Depression and management of his chronic medical condition(s) follow up   Patient's medical conditions are stable unless noted otherwise above   Patient has not had any recent hospitalizations, or medical emergencies since last visit  Overall patient reports feeling well with no further complaint(s) other than what is mentioned  Denies tobacco, and illicit drug consumption  Depression  This is a chronic problem  The problem occurs daily  The problem has been unchanged  Pertinent negatives include no abdominal pain, arthralgias, chest pain, chills, congestion, coughing, fatigue, fever, headaches, myalgias, nausea, numbness, rash, sore throat, vomiting or weakness  Review of Systems   Constitutional: Negative for activity change, appetite change, chills, fatigue and fever  HENT: Negative for congestion, postnasal drip, rhinorrhea, sneezing and sore throat  Eyes: Negative for visual disturbance  Respiratory: Negative for cough, chest tightness, shortness of breath and wheezing  Cardiovascular: Negative for chest pain, palpitations and leg swelling  Gastrointestinal: Negative for abdominal distention, abdominal pain, blood in stool, constipation, diarrhea, nausea and vomiting  Genitourinary: Negative for difficulty urinating, dysuria, hematuria, menstrual problem, vaginal bleeding and vaginal discharge  Musculoskeletal: Negative for arthralgias and myalgias  Skin: Negative for rash  Neurological: Negative for dizziness, syncope, weakness, light-headedness, numbness and headaches  Psychiatric/Behavioral: Positive for depression   Negative for agitation, behavioral problems and suicidal "ideas  Current Outpatient Medications on File Prior to Visit   Medication Sig   • Blood Pressure KIT Use 2 (two) times a day   • ferrous sulfate 324 (65 Fe) mg Take 1 tablet (324 mg total) by mouth daily before breakfast   • miconazole (MONISTAT-7) 2 % vaginal cream Insert 1 applicator into the vagina daily at bedtime   • [DISCONTINUED] metFORMIN (GLUCOPHAGE) 500 mg tablet Take 1 tablet (500 mg total) by mouth 2 (two) times a day with meals (Patient not taking: Reported on 3/27/2023)       Objective     /90 (BP Location: Left arm, Patient Position: Sitting, Cuff Size: Large)   Pulse 94   Temp 97 8 °F (36 6 °C) (Temporal)   Resp 16   Ht 5' 7\" (1 702 m)   Wt 103 kg (227 lb)   LMP 06/01/2023 (Exact Date)   SpO2 99%   BMI 35 55 kg/m²     Physical Exam  Vitals and nursing note reviewed  Constitutional:       General: She is awake  She is not in acute distress  Appearance: Normal appearance  She is well-developed and well-groomed  She is obese  She is not ill-appearing, toxic-appearing or diaphoretic  HENT:      Head: Normocephalic and atraumatic  Nose: Nose normal       Mouth/Throat:      Mouth: Mucous membranes are moist       Pharynx: Oropharynx is clear  No oropharyngeal exudate or posterior oropharyngeal erythema  Eyes:      Extraocular Movements: Extraocular movements intact  Conjunctiva/sclera: Conjunctivae normal       Pupils: Pupils are equal, round, and reactive to light  Cardiovascular:      Rate and Rhythm: Normal rate and regular rhythm  No extrasystoles are present  Pulses: Normal pulses  Radial pulses are 2+ on the right side and 2+ on the left side  Heart sounds: Normal heart sounds, S1 normal and S2 normal    Pulmonary:      Effort: Pulmonary effort is normal       Breath sounds: Normal breath sounds and air entry  Abdominal:      General: Bowel sounds are normal       Palpations: Abdomen is soft  There is no mass  Tenderness:  There is no " abdominal tenderness  There is no right CVA tenderness, left CVA tenderness or guarding  Musculoskeletal:         General: Normal range of motion  Cervical back: Normal range of motion  Right lower leg: No edema  Left lower leg: No edema  Skin:     General: Skin is warm  Capillary Refill: Capillary refill takes less than 2 seconds  Coloration: Skin is not jaundiced or pale  Findings: No bruising, erythema, lesion or rash  Neurological:      General: No focal deficit present  Mental Status: She is alert  Psychiatric:         Behavior: Behavior is cooperative         Danae Proctor MD

## 2023-06-29 ENCOUNTER — HOSPITAL ENCOUNTER (INPATIENT)
Facility: HOSPITAL | Age: 33
LOS: 1 days | Discharge: HOME | DRG: 093 | End: 2023-06-30
Attending: STUDENT IN AN ORGANIZED HEALTH CARE EDUCATION/TRAINING PROGRAM | Admitting: FAMILY MEDICINE

## 2023-06-29 ENCOUNTER — APPOINTMENT (EMERGENCY)
Dept: CT IMAGING | Facility: HOSPITAL | Age: 33
DRG: 093 | End: 2023-06-29

## 2023-06-29 DIAGNOSIS — R20.0 RIGHT FACIAL NUMBNESS: ICD-10-CM

## 2023-06-29 DIAGNOSIS — I10 HYPERTENSION, UNSPECIFIED TYPE: ICD-10-CM

## 2023-06-29 DIAGNOSIS — I10 HYPERTENSION: Primary | ICD-10-CM

## 2023-06-29 DIAGNOSIS — R11.2 NAUSEA AND VOMITING: ICD-10-CM

## 2023-06-29 DIAGNOSIS — I63.9 STROKE (HCC): ICD-10-CM

## 2023-06-29 PROBLEM — R11.0 NAUSEA: Status: RESOLVED | Noted: 2023-06-29 | Resolved: 2023-06-29

## 2023-06-29 PROBLEM — R11.0 NAUSEA: Status: ACTIVE | Noted: 2023-06-29

## 2023-06-29 LAB
ANION GAP SERPL CALCULATED.3IONS-SCNC: 10 MMOL/L
APTT PPP: 26 SECONDS (ref 23–37)
BUN SERPL-MCNC: 19 MG/DL (ref 5–25)
CALCIUM SERPL-MCNC: 9.7 MG/DL (ref 8.4–10.2)
CARDIAC TROPONIN I PNL SERPL HS: 4 NG/L
CHLORIDE SERPL-SCNC: 104 MMOL/L (ref 96–108)
CO2 SERPL-SCNC: 25 MMOL/L (ref 21–32)
CREAT SERPL-MCNC: 0.83 MG/DL (ref 0.6–1.3)
ERYTHROCYTE [DISTWIDTH] IN BLOOD BY AUTOMATED COUNT: 17.4 % (ref 11.6–15.1)
EXT PREGNANCY TEST URINE: NEGATIVE
EXT. CONTROL: NORMAL
GFR SERPL CREATININE-BSD FRML MDRD: 93 ML/MIN/1.73SQ M
GLUCOSE SERPL-MCNC: 136 MG/DL (ref 65–140)
GLUCOSE SERPL-MCNC: 147 MG/DL (ref 65–140)
HCT VFR BLD AUTO: 39.8 % (ref 34.8–46.1)
HGB BLD-MCNC: 12 G/DL (ref 11.5–15.4)
INR PPP: 0.93 (ref 0.84–1.19)
MCH RBC QN AUTO: 21.9 PG (ref 26.8–34.3)
MCHC RBC AUTO-ENTMCNC: 30.2 G/DL (ref 31.4–37.4)
MCV RBC AUTO: 73 FL (ref 82–98)
PLATELET # BLD AUTO: 328 THOUSANDS/UL (ref 149–390)
PMV BLD AUTO: 11.1 FL (ref 8.9–12.7)
POTASSIUM SERPL-SCNC: 3.3 MMOL/L (ref 3.5–5.3)
PROTHROMBIN TIME: 12.7 SECONDS (ref 11.6–14.5)
RBC # BLD AUTO: 5.49 MILLION/UL (ref 3.81–5.12)
SODIUM SERPL-SCNC: 139 MMOL/L (ref 135–147)
WBC # BLD AUTO: 10.69 THOUSAND/UL (ref 4.31–10.16)

## 2023-06-29 PROCEDURE — 80048 BASIC METABOLIC PNL TOTAL CA: CPT | Performed by: STUDENT IN AN ORGANIZED HEALTH CARE EDUCATION/TRAINING PROGRAM

## 2023-06-29 PROCEDURE — 82948 REAGENT STRIP/BLOOD GLUCOSE: CPT

## 2023-06-29 PROCEDURE — 36415 COLL VENOUS BLD VENIPUNCTURE: CPT | Performed by: STUDENT IN AN ORGANIZED HEALTH CARE EDUCATION/TRAINING PROGRAM

## 2023-06-29 PROCEDURE — 99285 EMERGENCY DEPT VISIT HI MDM: CPT | Performed by: STUDENT IN AN ORGANIZED HEALTH CARE EDUCATION/TRAINING PROGRAM

## 2023-06-29 PROCEDURE — 84443 ASSAY THYROID STIM HORMONE: CPT

## 2023-06-29 PROCEDURE — 81025 URINE PREGNANCY TEST: CPT | Performed by: STUDENT IN AN ORGANIZED HEALTH CARE EDUCATION/TRAINING PROGRAM

## 2023-06-29 PROCEDURE — 85027 COMPLETE CBC AUTOMATED: CPT | Performed by: STUDENT IN AN ORGANIZED HEALTH CARE EDUCATION/TRAINING PROGRAM

## 2023-06-29 PROCEDURE — 0241U HB NFCT DS VIR RESP RNA 4 TRGT: CPT | Performed by: STUDENT IN AN ORGANIZED HEALTH CARE EDUCATION/TRAINING PROGRAM

## 2023-06-29 PROCEDURE — 93005 ELECTROCARDIOGRAM TRACING: CPT

## 2023-06-29 PROCEDURE — 85730 THROMBOPLASTIN TIME PARTIAL: CPT | Performed by: STUDENT IN AN ORGANIZED HEALTH CARE EDUCATION/TRAINING PROGRAM

## 2023-06-29 PROCEDURE — 85610 PROTHROMBIN TIME: CPT | Performed by: STUDENT IN AN ORGANIZED HEALTH CARE EDUCATION/TRAINING PROGRAM

## 2023-06-29 PROCEDURE — 84484 ASSAY OF TROPONIN QUANT: CPT | Performed by: STUDENT IN AN ORGANIZED HEALTH CARE EDUCATION/TRAINING PROGRAM

## 2023-06-29 PROCEDURE — 99235 HOSP IP/OBS SAME DATE MOD 70: CPT | Performed by: FAMILY MEDICINE

## 2023-06-29 PROCEDURE — 99284 EMERGENCY DEPT VISIT MOD MDM: CPT

## 2023-06-29 RX ORDER — ASPIRIN 325 MG
325 TABLET ORAL ONCE
Status: COMPLETED | OUTPATIENT
Start: 2023-06-29 | End: 2023-06-29

## 2023-06-29 RX ORDER — ONDANSETRON 2 MG/ML
4 INJECTION INTRAMUSCULAR; INTRAVENOUS ONCE
Status: DISCONTINUED | OUTPATIENT
Start: 2023-06-29 | End: 2023-06-30 | Stop reason: HOSPADM

## 2023-06-29 RX ORDER — SODIUM CHLORIDE, SODIUM GLUCONATE, SODIUM ACETATE, POTASSIUM CHLORIDE, MAGNESIUM CHLORIDE, SODIUM PHOSPHATE, DIBASIC, AND POTASSIUM PHOSPHATE .53; .5; .37; .037; .03; .012; .00082 G/100ML; G/100ML; G/100ML; G/100ML; G/100ML; G/100ML; G/100ML
1000 INJECTION, SOLUTION INTRAVENOUS ONCE
Status: DISCONTINUED | OUTPATIENT
Start: 2023-06-29 | End: 2023-06-30 | Stop reason: HOSPADM

## 2023-06-29 RX ORDER — ATORVASTATIN CALCIUM 40 MG/1
40 TABLET, FILM COATED ORAL EVERY EVENING
Status: DISCONTINUED | OUTPATIENT
Start: 2023-06-29 | End: 2023-06-30

## 2023-06-29 RX ORDER — ASPIRIN 81 MG/1
81 TABLET, CHEWABLE ORAL DAILY
Status: DISCONTINUED | OUTPATIENT
Start: 2023-06-30 | End: 2023-06-30 | Stop reason: HOSPADM

## 2023-06-29 RX ORDER — POTASSIUM CHLORIDE 20 MEQ/1
40 TABLET, EXTENDED RELEASE ORAL ONCE
Status: COMPLETED | OUTPATIENT
Start: 2023-06-30 | End: 2023-06-30

## 2023-06-29 RX ORDER — ENOXAPARIN SODIUM 100 MG/ML
40 INJECTION SUBCUTANEOUS DAILY
Status: DISCONTINUED | OUTPATIENT
Start: 2023-06-30 | End: 2023-06-30 | Stop reason: HOSPADM

## 2023-06-29 RX ADMIN — ASPIRIN 325 MG ORAL TABLET 325 MG: 325 PILL ORAL at 23:16

## 2023-06-29 RX ADMIN — ATORVASTATIN CALCIUM 40 MG: 20 TABLET, FILM COATED ORAL at 23:43

## 2023-06-29 NOTE — LETTER
[address] [facility]  [address] [place] [id number]  [phone]  Dept: [phone]    June 30, 2023     Patient: Alfie Joseph   YOB: 1990   Date of Visit: 6/29/2023       To Whom it May Concern:    Alfie Joseph is under my professional care  She was seen in the hospital from 6/29/2023 to 06/30/23 along with her spouse Jesi Calvillo  If you have any questions or concerns, please don't hesitate to call           Sincerely,          Maria Teresa Cavazos MD

## 2023-06-30 ENCOUNTER — APPOINTMENT (INPATIENT)
Dept: CT IMAGING | Facility: HOSPITAL | Age: 33
DRG: 093 | End: 2023-06-30

## 2023-06-30 ENCOUNTER — APPOINTMENT (INPATIENT)
Dept: NON INVASIVE DIAGNOSTICS | Facility: HOSPITAL | Age: 33
DRG: 093 | End: 2023-06-30

## 2023-06-30 ENCOUNTER — APPOINTMENT (INPATIENT)
Dept: MRI IMAGING | Facility: HOSPITAL | Age: 33
DRG: 093 | End: 2023-06-30

## 2023-06-30 VITALS
TEMPERATURE: 98.7 F | WEIGHT: 223 LBS | HEART RATE: 97 BPM | DIASTOLIC BLOOD PRESSURE: 108 MMHG | BODY MASS INDEX: 35 KG/M2 | SYSTOLIC BLOOD PRESSURE: 160 MMHG | HEIGHT: 67 IN | RESPIRATION RATE: 18 BRPM | OXYGEN SATURATION: 98 %

## 2023-06-30 PROBLEM — R29.90 STROKE-LIKE SYMPTOM: Status: ACTIVE | Noted: 2023-06-29

## 2023-06-30 LAB
2HR DELTA HS TROPONIN: -1 NG/L
ANION GAP SERPL CALCULATED.3IONS-SCNC: 9 MMOL/L
AORTIC ROOT: 3 CM
AORTIC VALVE MEAN VELOCITY: 13.2 M/S
APICAL FOUR CHAMBER EJECTION FRACTION: 65 %
ASCENDING AORTA: 2.8 CM
ATRIAL RATE: 91 BPM
AV LVOT MEAN GRADIENT: 4 MMHG
AV LVOT PEAK GRADIENT: 7 MMHG
AV MEAN GRADIENT: 8 MMHG
AV PEAK GRADIENT: 16 MMHG
AV VELOCITY RATIO: 0.66
BASOPHILS # BLD AUTO: 0.02 THOUSANDS/ÂΜL (ref 0–0.1)
BASOPHILS NFR BLD AUTO: 0 % (ref 0–1)
BUN SERPL-MCNC: 13 MG/DL (ref 5–25)
CALCIUM SERPL-MCNC: 9.3 MG/DL (ref 8.4–10.2)
CARDIAC TROPONIN I PNL SERPL HS: 3 NG/L
CHLORIDE SERPL-SCNC: 103 MMOL/L (ref 96–108)
CHOLEST SERPL-MCNC: 175 MG/DL
CO2 SERPL-SCNC: 26 MMOL/L (ref 21–32)
CREAT SERPL-MCNC: 0.74 MG/DL (ref 0.6–1.3)
CREAT UR-MCNC: 30.7 MG/DL
DOP CALC AO PEAK VEL: 2.01 M/S
DOP CALC AO VTI: 39.7 CM
DOP CALC LVOT PEAK VEL VTI: 27.26 CM
DOP CALC LVOT PEAK VEL: 1.32 M/S
E WAVE DECELERATION TIME: 175 MS
EOSINOPHIL # BLD AUTO: 0.01 THOUSAND/ÂΜL (ref 0–0.61)
EOSINOPHIL NFR BLD AUTO: 0 % (ref 0–6)
ERYTHROCYTE [DISTWIDTH] IN BLOOD BY AUTOMATED COUNT: 17.2 % (ref 11.6–15.1)
EST. AVERAGE GLUCOSE BLD GHB EST-MCNC: 117 MG/DL
FLUAV RNA RESP QL NAA+PROBE: NEGATIVE
FLUBV RNA RESP QL NAA+PROBE: NEGATIVE
FRACTIONAL SHORTENING: 36 % (ref 28–44)
GFR SERPL CREATININE-BSD FRML MDRD: 107 ML/MIN/1.73SQ M
GLUCOSE SERPL-MCNC: 104 MG/DL (ref 65–140)
HBA1C MFR BLD: 5.7 %
HCT VFR BLD AUTO: 35.6 % (ref 34.8–46.1)
HDLC SERPL-MCNC: 43 MG/DL
HGB BLD-MCNC: 10.8 G/DL (ref 11.5–15.4)
IMM GRANULOCYTES # BLD AUTO: 0.02 THOUSAND/UL (ref 0–0.2)
IMM GRANULOCYTES NFR BLD AUTO: 0 % (ref 0–2)
INTERVENTRICULAR SEPTUM IN DIASTOLE (PARASTERNAL SHORT AXIS VIEW): 0.9 CM
INTERVENTRICULAR SEPTUM: 0.9 CM (ref 0.6–1.1)
LAAS-AP2: 22.5 CM2
LAAS-AP4: 20.5 CM2
LDLC SERPL CALC-MCNC: 119 MG/DL (ref 0–100)
LEFT ATRIUM SIZE: 4 CM
LEFT ATRIUM VOLUME (MOD BIPLANE): 67 ML
LEFT INTERNAL DIMENSION IN SYSTOLE: 3 CM (ref 2.1–4)
LEFT VENTRICULAR INTERNAL DIMENSION IN DIASTOLE: 4.7 CM (ref 3.5–6)
LEFT VENTRICULAR POSTERIOR WALL IN END DIASTOLE: 1 CM
LEFT VENTRICULAR STROKE VOLUME: 69 ML
LVSV (TEICH): 69 ML
LYMPHOCYTES # BLD AUTO: 2.5 THOUSANDS/ÂΜL (ref 0.6–4.47)
LYMPHOCYTES NFR BLD AUTO: 25 % (ref 14–44)
MAGNESIUM SERPL-MCNC: 1.9 MG/DL (ref 1.9–2.7)
MCH RBC QN AUTO: 22 PG (ref 26.8–34.3)
MCHC RBC AUTO-ENTMCNC: 30.3 G/DL (ref 31.4–37.4)
MCV RBC AUTO: 73 FL (ref 82–98)
MICROALBUMIN UR-MCNC: 51.7 MG/L (ref 0–20)
MICROALBUMIN/CREAT 24H UR: 168 MG/G CREATININE (ref 0–30)
MONOCYTES # BLD AUTO: 0.84 THOUSAND/ÂΜL (ref 0.17–1.22)
MONOCYTES NFR BLD AUTO: 9 % (ref 4–12)
MV E'TISSUE VEL-SEP: 11 CM/S
MV PEAK A VEL: 0.6 M/S
MV PEAK E VEL: 110 CM/S
MV STENOSIS PRESSURE HALF TIME: 51 MS
MV VALVE AREA P 1/2 METHOD: 4.31 CM2
NEUTROPHILS # BLD AUTO: 6.52 THOUSANDS/ÂΜL (ref 1.85–7.62)
NEUTS SEG NFR BLD AUTO: 66 % (ref 43–75)
NRBC BLD AUTO-RTO: 0 /100 WBCS
P AXIS: 49 DEGREES
PHOSPHATE SERPL-MCNC: 3 MG/DL (ref 2.7–4.5)
PLATELET # BLD AUTO: 293 THOUSANDS/UL (ref 149–390)
PMV BLD AUTO: 11.5 FL (ref 8.9–12.7)
POTASSIUM SERPL-SCNC: 3.7 MMOL/L (ref 3.5–5.3)
PR INTERVAL: 132 MS
QRS AXIS: 45 DEGREES
QRSD INTERVAL: 96 MS
QT INTERVAL: 346 MS
QTC INTERVAL: 425 MS
RBC # BLD AUTO: 4.9 MILLION/UL (ref 3.81–5.12)
RIGHT VENTRICLE ID DIMENSION: 3.6 CM
RSV RNA RESP QL NAA+PROBE: NEGATIVE
SARS-COV-2 RNA RESP QL NAA+PROBE: NEGATIVE
SL CV LEFT ATRIUM LENGTH A2C: 5.5 CM
SL CV LV EF: 60
SL CV PED ECHO LEFT VENTRICLE DIASTOLIC VOLUME (MOD BIPLANE) 2D: 105 ML
SL CV PED ECHO LEFT VENTRICLE SYSTOLIC VOLUME (MOD BIPLANE) 2D: 36 ML
SODIUM SERPL-SCNC: 138 MMOL/L (ref 135–147)
T WAVE AXIS: 2 DEGREES
TR MAX PG: 31 MMHG
TR PEAK VELOCITY: 2.8 M/S
TRICUSPID ANNULAR PLANE SYSTOLIC EXCURSION: 3 CM
TRICUSPID VALVE PEAK REGURGITATION VELOCITY: 2.78 M/S
TRIGL SERPL-MCNC: 64 MG/DL
TSH SERPL DL<=0.05 MIU/L-ACNC: 4.44 UIU/ML (ref 0.45–4.5)
VENTRICULAR RATE: 91 BPM
WBC # BLD AUTO: 9.91 THOUSAND/UL (ref 4.31–10.16)

## 2023-06-30 PROCEDURE — 70496 CT ANGIOGRAPHY HEAD: CPT

## 2023-06-30 PROCEDURE — 93306 TTE W/DOPPLER COMPLETE: CPT | Performed by: INTERNAL MEDICINE

## 2023-06-30 PROCEDURE — 82043 UR ALBUMIN QUANTITATIVE: CPT

## 2023-06-30 PROCEDURE — 93010 ELECTROCARDIOGRAM REPORT: CPT | Performed by: INTERNAL MEDICINE

## 2023-06-30 PROCEDURE — NC001 PR NO CHARGE: Performed by: FAMILY MEDICINE

## 2023-06-30 PROCEDURE — 70551 MRI BRAIN STEM W/O DYE: CPT

## 2023-06-30 PROCEDURE — G1004 CDSM NDSC: HCPCS

## 2023-06-30 PROCEDURE — 92610 EVALUATE SWALLOWING FUNCTION: CPT

## 2023-06-30 PROCEDURE — 80061 LIPID PANEL: CPT

## 2023-06-30 PROCEDURE — NC001 PR NO CHARGE: Performed by: PSYCHIATRY & NEUROLOGY

## 2023-06-30 PROCEDURE — 70498 CT ANGIOGRAPHY NECK: CPT

## 2023-06-30 PROCEDURE — 93306 TTE W/DOPPLER COMPLETE: CPT

## 2023-06-30 PROCEDURE — 84100 ASSAY OF PHOSPHORUS: CPT

## 2023-06-30 PROCEDURE — 80048 BASIC METABOLIC PNL TOTAL CA: CPT

## 2023-06-30 PROCEDURE — 83036 HEMOGLOBIN GLYCOSYLATED A1C: CPT

## 2023-06-30 PROCEDURE — G0426 INPT/ED TELECONSULT50: HCPCS | Performed by: PSYCHIATRY & NEUROLOGY

## 2023-06-30 PROCEDURE — 83735 ASSAY OF MAGNESIUM: CPT

## 2023-06-30 PROCEDURE — 85025 COMPLETE CBC W/AUTO DIFF WBC: CPT

## 2023-06-30 PROCEDURE — 82570 ASSAY OF URINE CREATININE: CPT

## 2023-06-30 RX ORDER — ATORVASTATIN CALCIUM 20 MG/1
20 TABLET, FILM COATED ORAL EVERY EVENING
Qty: 90 TABLET | Refills: 0 | Status: SHIPPED | OUTPATIENT
Start: 2023-06-30 | End: 2023-09-28

## 2023-06-30 RX ORDER — ATORVASTATIN CALCIUM 20 MG/1
20 TABLET, FILM COATED ORAL EVERY EVENING
Status: DISCONTINUED | OUTPATIENT
Start: 2023-06-30 | End: 2023-06-30 | Stop reason: HOSPADM

## 2023-06-30 RX ORDER — LORAZEPAM 2 MG/ML
1 INJECTION INTRAMUSCULAR ONCE AS NEEDED
Status: COMPLETED | OUTPATIENT
Start: 2023-06-30 | End: 2023-06-30

## 2023-06-30 RX ORDER — BLOOD PRESSURE TEST KIT
KIT MISCELLANEOUS EVERY OTHER DAY
Qty: 1 KIT | Refills: 0 | Status: SHIPPED | OUTPATIENT
Start: 2023-06-30

## 2023-06-30 RX ORDER — LORAZEPAM 2 MG/ML
1 INJECTION INTRAMUSCULAR ONCE
Status: DISCONTINUED | OUTPATIENT
Start: 2023-06-30 | End: 2023-06-30

## 2023-06-30 RX ORDER — ASPIRIN 81 MG/1
81 TABLET, CHEWABLE ORAL DAILY
Qty: 90 TABLET | Refills: 0 | Status: SHIPPED | OUTPATIENT
Start: 2023-07-01 | End: 2023-09-29

## 2023-06-30 RX ORDER — LOSARTAN POTASSIUM 25 MG/1
25 TABLET ORAL DAILY
Qty: 30 TABLET | Refills: 0 | Status: SHIPPED | OUTPATIENT
Start: 2023-06-30

## 2023-06-30 RX ORDER — LORAZEPAM 2 MG/ML
0.5 INJECTION INTRAMUSCULAR ONCE AS NEEDED
Status: DISCONTINUED | OUTPATIENT
Start: 2023-06-30 | End: 2023-06-30

## 2023-06-30 RX ADMIN — LORAZEPAM 1 MG: 2 INJECTION INTRAMUSCULAR; INTRAVENOUS at 10:45

## 2023-06-30 RX ADMIN — POTASSIUM CHLORIDE 40 MEQ: 1500 TABLET, EXTENDED RELEASE ORAL at 03:34

## 2023-06-30 RX ADMIN — IOHEXOL 100 ML: 350 INJECTION, SOLUTION INTRAVENOUS at 03:18

## 2023-06-30 RX ADMIN — LORAZEPAM 1 MG: 2 INJECTION INTRAMUSCULAR; INTRAVENOUS at 11:34

## 2023-06-30 RX ADMIN — ASPIRIN 81 MG 81 MG: 81 TABLET ORAL at 10:00

## 2023-06-30 NOTE — UTILIZATION REVIEW
Initial Clinical Review    Admission: Date/Time/Statement:   Admission Orders (From admission, onward)     Ordered        06/29/23 2326  Inpatient Admission  Once                      Orders Placed This Encounter   Procedures   • Inpatient Admission     Standing Status:   Standing     Number of Occurrences:   1     Order Specific Question:   Level of Care     Answer:   Med Surg [16]     Order Specific Question:   Estimated length of stay     Answer:   More than 2 Midnights     Order Specific Question:   Certification     Answer:   I certify that inpatient services are medically necessary for this patient for a duration of greater than two midnights  See H&P and MD Progress Notes for additional information about the patient's course of treatment  ED Arrival Information     Expected   -    Arrival   6/29/2023 21:32    Acuity   Emergent            Means of arrival   Ambulance    Escorted by   [facility] ([address])    [facility]    Admission type   Emergency            Arrival complaint   Headache            Chief Complaint   Patient presents with   • Vomiting     Pt c/o of n/v since work accident back in September '22    • Numbness     R sided numbness that reported started 45 mins pta  Initial Presentation: 28 y o  female who presented by EMS to [address] [address] ED  Inpatient admission for evaluation and treatment of R-sided numbness  PMHx: T2DM, HTN  Presented w/ R-sided numbness for 45 minutes w/ decreased sensation of RUE & RLL  On exam, RUE, RLE and R facial numbness  Imaging unremarkable  Plan: start ASA/statin; check MRI brain, check A1c, TSH and lipid panel; PT/OT/ST, permissive HTN, low sodium diet  Neurology consulted  Date: 06/30/23   Day 2:  Pt noted to have elevated BP; needs to start losartan 25mg and check her blood pressure at home  Exam unremarkable, endorses anxiety  Continue other current meds, Trend labs, replete electrolytes as needed   Supportive care     Neurology: Pt w/ NIHSS 1  Imaging without acute findings  Load  mg then ASA 81 mg daily; goal MAP >100 w/ SBP under 210  Neuro checks       ED Triage Vitals   Temperature Pulse Respirations Blood Pressure SpO2   06/29/23 2142 06/29/23 2142 06/29/23 2142 06/29/23 2142 06/29/23 2142   98 5 °F (36 9 °C) (!) 107 20 (!) 171/107 100 %      Temp Source Heart Rate Source Patient Position - Orthostatic VS BP Location FiO2 (%)   06/29/23 2142 06/29/23 2142 06/29/23 2142 06/29/23 2142 --   Tympanic Monitor Sitting Left arm       Pain Score       06/30/23 0045       No Pain          Wt Readings from Last 1 Encounters:   06/30/23 101 kg (223 lb 5 2 oz)     Additional Vital Signs:   Date/Time Temp Pulse Resp BP MAP (mmHg) SpO2 O2 Device   06/30/23 0744 97 6 °F (36 4 °C) 96 18 157/94 -- 99 % None (Room air)   06/30/23 0545 97 5 °F (36 4 °C) 85 20 147/94 106 97 % None (Room air)   06/30/23 0345 98 5 °F (36 9 °C) 102 18 172/110 Abnormal  125 100 % None (Room air)   06/30/23 0245 98 2 °F (36 8 °C) 95 18 172/110 Abnormal  117 99 % None (Room air)   06/30/23 0145 98 1 °F (36 7 °C) 76 18 155/99 115 -- None (Room air)   06/30/23 0045 98 3 °F (36 8 °C) 92 17 159/102 Abnormal  125 98 % None (Room air)   06/30/23 0000 -- 98 16 155/90 -- 100 % None (Room air)   06/29/23 2300 -- 103 16 150/93 -- 100 % None (Room air)   06/29/23 2245 -- 94 -- 187/94 Abnormal  -- 100 % None (Room air)   06/29/23 2230 -- 112 Abnormal  -- 150/74 -- 100 % None (Room air)   06/29/23 2215 -- 101 16 154/96 -- 100 % None (Room air)   06/29/23 2157 -- 103 18 160/94 -- 100 % None (Room air)     Morris Coma Scale  Date and Time Eye Opening Best Verbal Response Best Motor Response Morris Coma Scale Score   06/30/23 0345 4 5 6 15   06/30/23 0245 4 5 6 15   06/30/23 0145 4 5 6 15   06/30/23 0045 4 5 6 15   06/30/23 0000 4 5 6 15   06/29/23 2330 4 5 6 15   06/29/23 2230 4 5 6 15   06/29/23 2215 4 5 6 15   06/29/23 2200 4 5 6 15   06/29/23 2145 4 5 6 15 Pertinent Labs/Diagnostic Test Results:   CTA stroke alert (head/neck)   Final Result by Juan M Hyman MD (06/30 0358)      No evidence of hemodynamically significant stenosis or large vessel occlusion within the major vessels of the [place]  Findings were directly discussed with Dr Navjot Vidales at 3:45 a m  on 6/30/2023  Workstation performed: [id number]         CT stroke alert brain   Final Result by Juan M Hyman MD (06/30 2727)      No acute intracranial abnormality  No interval change  Findings were directly discussed with Dr Navjot Vidales at 3:45 a m  on 6/30/2023  Workstation performed: [id number]         CT stroke alert brain   Final Result by Chase Christiansen MD (06/29 2229)      No acute intracranial abnormality           I personally discussed this study with Dr Brandon Watkins on 6/29/2023 10:00 PM             Workstation performed: [id number]         MRI brain wo contrast    (Results Pending)     Results from last 7 days   Lab Units 06/29/23  2333   SARS-COV-2  Negative     Results from last 7 days   Lab Units 06/30/23  0504 06/29/23 2149   WBC Thousand/uL 9 91 10 69*   HEMOGLOBIN g/dL 10 8* 12 0   HEMATOCRIT % 35 6 39 8   PLATELETS Thousands/uL 293 328   NEUTROS ABS Thousands/µL 6 52  --          Results from last 7 days   Lab Units 06/30/23  0504 06/29/23  2149   SODIUM mmol/L 138 139   POTASSIUM mmol/L 3 7 3 3*   CHLORIDE mmol/L 103 104   CO2 mmol/L 26 25   ANION GAP mmol/L 9 10   BUN mg/dL 13 19   CREATININE mg/dL 0 74 0 83   EGFR ml/min/1 73sq m 107 93   CALCIUM mg/dL 9 3 9 7   MAGNESIUM mg/dL 1 9  --    PHOSPHORUS mg/dL 3 0  --          Results from last 7 days   Lab Units 06/29/23  2149   POC GLUCOSE mg/dl 147*     Results from last 7 days   Lab Units 06/30/23  0504 06/29/23 2149   GLUCOSE RANDOM mg/dL 104 136     Results from last 7 days   Lab Units 06/29/23  2346 06/29/23 2149   HS TNI 0HR ng/L  --  4   HS TNI 2HR ng/L 3  --    HSTNI D2 ng/L -1  -- Results from last 7 days   Lab Units 06/29/23  2149   PROTIME seconds 12 7   INR  0 93   PTT seconds 26     Results from last 7 days   Lab Units 06/29/23  2149   TSH 3RD GENERATON uIU/mL 4 444     Results from last 7 days   Lab Units 06/29/23  2333   INFLUENZA A PCR  Negative   INFLUENZA B PCR  Negative   RSV PCR  Negative         ED Treatment:   Medication Administration from 06/29/2023 2132 to 06/30/2023 0035       Date/Time Order Dose Route Action     06/29/2023 2316 EDT aspirin tablet 325 mg 325 mg Oral Given     06/29/2023 2343 EDT atorvastatin (LIPITOR) tablet 40 mg 40 mg Oral Given        Past Medical History:   Diagnosis Date   • Diabetes mellitus ([facility] )    • Hypertension        Admitting Diagnosis: Hypertension [I10]  Nausea and vomiting [R11 2]  Stroke ([facility] ) [I63 9]  Right facial numbness [R20 0]  Age/Sex: 28 y o  female  Admission Orders:  Regular Diet w/ 2 gm sodium restriction  Baseline NIH stroke scale on admission, reassess Q24H x 2 D  Nursing dysphagia assessment prior to staring diet  Neuro checks, q1h x4h, q2h x8h, q4h x72h  Telemetry  SCDs  Scheduled Medications:  aspirin, 81 mg, Oral, Daily  atorvastatin, 40 mg, Oral, QPM  enoxaparin, 40 mg, Subcutaneous, Daily  LORazepam, 1 mg, Intravenous, Once    Continuous IV Infusions: none    PRN Meds:  potassium chloride, 40 mEq, Oral; 6/30 x1      IP CONSULT TO NEUROLOGY  IP CONSULT TO CASE MANAGEMENT  IP CONSULT TO NUTRITION SERVICES    Network Utilization Review Department  ATTENTION: Please call with any questions or concerns to [phone] and carefully listen to the prompts so that you are directed to the right person  All voicemails are confidential   Charlotte Allen all requests for admission clinical reviews, approved or denied determinations and any other requests to dedicated fax number below belonging to the campus where the patient is receiving treatment   List of dedicated fax numbers for the Facilities:  [facility] ADMISSION DENIALS (Administrative/Medical Necessity) [phone]   [address] (Maternity/NICU/Pediatrics) Rachell Prado [address] [place] Baer  [phone]   [address] [address] [facility] [place] [place] [address] [facility] [address] [facility] [place] [address] [phone]

## 2023-06-30 NOTE — DISCHARGE SUMMARY
Discharge Summary - Yuridia Alatorre    Patient Information: Geovany Sales 28 y o  female MRN: [id number]  Unit/Bed#: 7[facility] 718-01 Encounter: [id number]    Discharging Physician / Practitioner: Chadwick Mckenzie MD  PCP: Azeem Toledo MD  Admission Date:   Admission Orders (From admission, onward)     Ordered        06/29/23 2326  Inpatient Admission  Once                      Discharge Date: 6/30/23    Reason for Admission: Stroke rule out     Discharge Diagnoses:     Principal Problem:    Stroke-like symptom  Active Problems:    Hypertension  Resolved Problems:    Nausea        * Stroke-like symptom  Assessment & Plan  NIH score of 1 on presentation  not a tpa candidate per neurology  S/p ASA load 325 mg  CT brain, CTA head/neck, MRI brain all unremarkable  Symptoms likely to be attributable to able to uncontrolled hypertension    Neurology reevaluated patient and deemed to not to be a true stroke event  Considering multiple vascular risk factors, recommended to continue  stroke/ cardiovascular risk prevention  Cleared for discharge to follow-up with PCP  PLAN:  - Aspirin 81 mg daily   - Lipitor 20 mg daily  - Follow-up with PCP     Hypertension  Assessment & Plan  BP Readings from Last 3 Encounters:   06/30/23 (!) 154/104   06/05/23 140/90   05/04/23 132/88     BP previouslly controlled on no medication  Could attribute elevated BP to acute setting versus anxiousness  Elevated BP could be reason for presentation on admission since stroke ruled out  Given elevated BP in multiple settings including clinic, antihypertensive will have to be initiated  Plan   -Start losartan 25 mg  -BP Kit    Record blood pressure every other day and return log to PCP  -Follow-up albumin/creatinine ratio       Consultations During Hospital Stay:  · Neurology    Procedures Performed:   · None    Significant Findings / Test Results:   · CT brain, CTA head/neck, MRI brain all unremarkable  · LDL: 119    Incidental Findings:   · None     Test Results Pending at Discharge (will require follow up): · Albumin/Creatinine Ratio     Outpatient Tests Requested:  · None    Outpatient follow-up Requested:  • PCP    Complications:  None    Hospital Course:     Pawan Palmer is a 28 y o  female patient who originally presented to the hospital on 6/29/2023 due to right sided numbness for a few hours  It started progressively, and was associated with decreased sensation of the arm and leg  No associated symptoms  No  recent trauma, LOC, trouble swallowing, headaches, blurry vision, ringing in the ears  No chest pain, SOB, abdominal pain, diarrhea, constipation or urinary symptoms  Patient further reported she had intermittent episodes of nausea and nbnb vomiting been present for the past 6 months, following an injury of the head at work  Stroke alert was called in the ED, patient received 325 mg of aspirin and was admitted    During hospital stay patient was noted to be comfortable although very nervous and anxious about her current admission  Patient was consoled and advised that work-up is necessary to rule out a possible stroke  All imaging unremarkable  Patient was noted to have elevated BP and it was explained to her that she needs to start losartan 25mg and check her blood pressure at home  On day of discharge, neurology reevaluated the patient and deemed her okay for discharge and to follow-up with PCP  Patient agrees with plan for discharge  Review of Systems   Constitutional: Negative for chills, fatigue and fever  Eyes: Negative for pain and visual disturbance  Respiratory: Negative for cough and shortness of breath  Cardiovascular: Negative for chest pain and palpitations  Gastrointestinal: Positive for nausea  Negative for abdominal pain, constipation and vomiting  Genitourinary: Negative for dysuria and hematuria  Musculoskeletal: Negative for arthralgias and back pain  "  Skin: Negative for color change and rash  Neurological: Negative for dizziness, seizures, syncope, light-headedness and headaches  All other systems reviewed and are negative  Condition at Discharge: stable     Discharge Day Visit / Exam:     Vitals: Blood Pressure: (!) 160/108 (06/30/23 1548)  Pulse: 97 (06/30/23 1548)  Temperature: 98 7 °F (37 1 °C) (06/30/23 1548)  Temp Source: Temporal (06/30/23 1548)  Respirations: 18 (06/30/23 1548)  Height: 5' 7\" (170 2 cm) (06/30/23 0845)  Weight - Scale: 101 kg (223 lb) (06/30/23 0845)  SpO2: 98 % (06/30/23 1548)       Exam:   Physical Exam  Vitals reviewed  Constitutional:       General: She is not in acute distress  Appearance: She is well-developed  She is not diaphoretic  HENT:      Head: Normocephalic and atraumatic  Nose: Nose normal       Mouth/Throat:      Mouth: Mucous membranes are moist    Eyes:      Conjunctiva/sclera: Conjunctivae normal       Pupils: Pupils are equal, round, and reactive to light  Cardiovascular:      Rate and Rhythm: Normal rate and regular rhythm  Heart sounds: Normal heart sounds  No murmur heard  No friction rub  No gallop  Pulmonary:      Effort: Pulmonary effort is normal  No respiratory distress  Breath sounds: Normal breath sounds  No wheezing or rales  Abdominal:      General: Bowel sounds are normal  There is no distension  Palpations: Abdomen is soft  Tenderness: There is no abdominal tenderness  Musculoskeletal:         General: Normal range of motion  Cervical back: Normal range of motion and neck supple  Skin:     General: Skin is warm and dry  Findings: No erythema or rash  Neurological:      General: No focal deficit present  Mental Status: She is alert and oriented to person, place, and time  Cranial Nerves: No cranial nerve deficit  Sensory: No sensory deficit  Motor: No weakness  Psychiatric:         Mood and Affect: Mood is anxious   " Discussion with Family: Yes  Patient Information Sharing: With the consent of Phyllis Moore , their loved ones Ramila Carlisle were notified today by inpatient team of the patient’s condition and current plan  All questions answered  Discharge instructions/Information to patient and family:   See after visit summary for information provided to patient and family  Discharge Medications:  Current Outpatient Medications   Medication Instructions   • [START ON 7/1/2023] aspirin 81 mg, Oral, Daily   • atorvastatin (LIPITOR) 20 mg, Oral, Every evening   • Blood Pressure KIT Does not apply, Every other day   • ferrous sulfate 324 mg, Oral, Daily before breakfast   • losartan (COZAAR) 25 mg, Oral, Daily   •         Provisions for Follow-Up Care:  See after visit summary for information related to follow-up care and any pertinent home health orders  Disposition:     Home    For Discharges to [facility]:   · Not Applicable to this Patient - Not Applicable to this Patient    Planned Readmission: No     Discharge Statement:  I spent 20 minutes discharging the patient  This time was spent on the day of discharge  I had direct contact with the patient on the day of discharge  Greater than 50% of the total time was spent examining patient, answering all patient questions, arranging and discussing plan of care with patient as well as directly providing post-discharge instructions  Additional time then spent on discharge activities      ** Please Note: This note has been constructed using a voice recognition system **    Lashonda Cole MD  06/30/23  3:54 PM

## 2023-06-30 NOTE — ED PROVIDER NOTES
History  Chief Complaint   Patient presents with   • Vomiting     Pt c/o of n/v since work accident back in September '22    • Numbness     R sided numbness that reported started 45 mins pta  Patient is a 42-year-old female, past medical history of hypertension, and diabetes (although patient does not take any medications), who presents to the emergency department for nausea, vomiting, and right-sided numbness  Patient states she has had intermittent nausea and vomiting for the past couple of months ever since a work accident  She had another episode today  About 30 to 45 minutes ago she developed sudden onset right-sided facial numbness, as well as right upper extremity decreased sensation and right lower extremity decreased sensation  She has never had sensory changes like this in the past  Given this new symptom she decided to seek care  No modifying factors for her symptoms  Associated symptoms include a brief episode of chest pain earlier this afternoon, as well as a headache this AM (that has since resolved)  No shortness of breath  No other complaints or concerns  Prior to Admission Medications   Prescriptions Last Dose Informant Patient Reported? Taking? Blood Pressure KIT Not Taking  No No   Sig: Use 2 (two) times a day   Patient not taking: Reported on 6/29/2023   ferrous sulfate 324 (65 Fe) mg   No No   Sig: Take 1 tablet (324 mg total) by mouth daily before breakfast   miconazole (MONISTAT-7) 2 % vaginal cream Not Taking  No No   Sig: Insert 1 applicator into the vagina daily at bedtime   Patient not taking: Reported on 6/29/2023      Facility-Administered Medications: None       Past Medical History:   Diagnosis Date   • Diabetes mellitus ([facility] [address] )    • Hypertension        History reviewed  No pertinent surgical history      Family History   Problem Relation Age of Onset   • Breast cancer Neg Hx    • Colon cancer Neg Hx    • Ovarian cancer Neg Hx    • Cancer Neg Hx      I have reviewed and agree with the history as documented  E-Cigarette/Vaping   • E-Cigarette Use Never User      E-Cigarette/Vaping Substances     Social History     Tobacco Use   • Smoking status: Never     Passive exposure: Never   • Smokeless tobacco: Never   Vaping Use   • Vaping Use: Never used   Substance Use Topics   • Alcohol use: Yes     Comment: once/month   • Drug use: Never       Review of Systems   Cardiovascular: Positive for chest pain  Neurological: Positive for numbness  Negative for weakness  All other systems reviewed and are negative  Physical Exam  Physical Exam  Vitals and nursing note reviewed  Constitutional:       General: She is not in acute distress  Appearance: She is well-developed  She is not diaphoretic  HENT:      Head: Normocephalic and atraumatic  Right Ear: External ear normal       Left Ear: External ear normal       Nose: Nose normal    Eyes:      General: Lids are normal  No scleral icterus  Cardiovascular:      Rate and Rhythm: Normal rate and regular rhythm  Heart sounds: Normal heart sounds  No murmur heard  No friction rub  No gallop  Pulmonary:      Effort: Pulmonary effort is normal  No respiratory distress  Breath sounds: Normal breath sounds  No wheezing or rales  Abdominal:      Palpations: Abdomen is soft  Tenderness: There is no abdominal tenderness  There is no guarding or rebound  Musculoskeletal:         General: No deformity  Normal range of motion  Cervical back: Normal range of motion and neck supple  Skin:     General: Skin is warm and dry  Neurological:      General: No focal deficit present  Mental Status: She is alert and oriented to person, place, and time  GCS: GCS eye subscore is 4  GCS verbal subscore is 5  GCS motor subscore is 6  Sensory: Sensory deficit present  Motor: Motor function is intact  No weakness  Coordination: Coordination is intact   Finger-Nose-Finger Test normal       Comments: Patient states she is unable to feel me touching on the entire right side of her face  Left facial sensation intact  She has decreased sensation to the entire right upper extremity, as well as right lower extremity  No sensory deficits in the left upper extremity or left lower extremity  5 out of 5 strength in the bilateral upper and lower extremities  Rest of cranial nerves are intact     Psychiatric:         Mood and Affect: Mood normal          Behavior: Behavior normal          Vital Signs  ED Triage Vitals [06/29/23 2142]   Temperature Pulse Respirations Blood Pressure SpO2   98 5 °F (36 9 °C) (!) 107 20 (!) 171/107 100 %      Temp Source Heart Rate Source Patient Position - Orthostatic VS BP Location FiO2 (%)   Tympanic Monitor Sitting Left arm --      Pain Score       --           Vitals:    06/29/23 2157 06/29/23 2215 06/29/23 2230 06/29/23 2245   BP: 160/94 154/96 150/74 (!) 187/94   Pulse: 103 101 (!) 112 94   Patient Position - Orthostatic VS: Sitting Sitting Sitting Sitting         Visual Acuity  Visual Acuity    Flowsheet Row Most Recent Value   L Pupil Size (mm) 2   R Pupil Size (mm) 2          ED Medications  Medications   ondansetron (ZOFRAN) injection 4 mg (4 mg Intravenous Not Given 6/29/23 2231)   multi-electrolyte (ISOLYTE-S PH 7 4) bolus 1,000 mL (1,000 mL Intravenous Not Given 6/29/23 2230)   aspirin tablet 325 mg (0 mg Oral Hold 6/29/23 2244)       Diagnostic Studies  Results Reviewed     Procedure Component Value Units Date/Time    HS Troponin 0hr (reflex protocol) [[id number]]  (Normal) Collected: 06/29/23 2149    Lab Status: Final result Specimen: Blood from Arm, Right Updated: 06/29/23 2225     hs TnI 0hr 4 ng/L     HS Troponin I 2hr [[id number]]     Lab Status: No result Specimen: Blood     POCT pregnancy, urine [[id number]]  (Normal) Resulted: 06/29/23 2218    Lab Status: Final result Updated: 06/29/23 2218     EXT Preg Test, Ur Negative     Control Valid    Basic metabolic panel [[id number]]  (Abnormal) Collected: 06/29/23 2149    Lab Status: Final result Specimen: Blood from Arm, Right Updated: 06/29/23 2217     Sodium 139 mmol/L      Potassium 3 3 mmol/L      Chloride 104 mmol/L      CO2 25 mmol/L      ANION GAP 10 mmol/L      BUN 19 mg/dL      Creatinine 0 83 mg/dL      Glucose 136 mg/dL      Calcium 9 7 mg/dL      eGFR 93 ml/min/1 73sq m     Narrative:      [place] guidelines for Chronic Kidney Disease (CKD):   •  Stage 1 with normal or high GFR (GFR > 90 mL/min/1 73 square meters)  •  Stage 2 Mild CKD (GFR = 60-89 mL/min/1 73 square meters)  •  Stage 3A Moderate CKD (GFR = 45-59 mL/min/1 73 square meters)  •  Stage 3B Moderate CKD (GFR = 30-44 mL/min/1 73 square meters)  •  Stage 4 Severe CKD (GFR = 15-29 mL/min/1 73 square meters)  •  Stage 5 End Stage CKD (GFR <15 mL/min/1 73 square meters)  Note: GFR calculation is accurate only with a steady state creatinine    Protime-INR [[id number]]  (Normal) Collected: 06/29/23 2149    Lab Status: Final result Specimen: Blood from Arm, Right Updated: 06/29/23 2212     Protime 12 7 seconds      INR 0 93    APTT [[id number]]  (Normal) Collected: 06/29/23 2149    Lab Status: Final result Specimen: Blood from Arm, Right Updated: 06/29/23 2212     PTT 26 seconds     CBC and Platelet [[id number]]  (Abnormal) Collected: 06/29/23 2149    Lab Status: Final result Specimen: Blood from Arm, Right Updated: 06/29/23 2200     WBC 10 69 Thousand/uL      RBC 5 49 Million/uL      Hemoglobin 12 0 g/dL      Hematocrit 39 8 %      MCV 73 fL      MCH 21 9 pg      MCHC 30 2 g/dL      RDW 17 4 %      Platelets 660 Thousands/uL      MPV 11 1 fL     Fingerstick Glucose (POCT) [[id number]]  (Abnormal) Collected: 06/29/23 2149    Lab Status: Final result Updated: 06/29/23 2150     POC Glucose 147 mg/dl     FLU/RSV/COVID - if FLU/RSV clinically relevant [[id number]]     Lab Status: No result Specimen: Nares from Nose CT stroke alert brain   Final Result by Argenis Selby MD (06/29 2229)      No acute intracranial abnormality  I personally discussed this study with Dr Juan Antonio Calle on 6/29/2023 10:00 PM             Workstation performed: [id number]                    Procedures  ECG 12 Lead Documentation Only    Date/Time: 6/29/2023 10:55 PM    Performed by: Irma Sahu DO  Authorized by: Irma Sahu DO    ECG reviewed by me, the ED Provider: yes    Patient location:  ED  Interpretation:     Interpretation: abnormal    Rate:     ECG rate:  91    ECG rate assessment: normal    Rhythm:     Rhythm: sinus rhythm    Ectopy:     Ectopy: none    QRS:     QRS axis:  Normal  Conduction:     Conduction: normal    ST segments:     ST segments:  Normal  T waves:     T waves: normal    Other findings:     Other findings: LAE               ED Course  ED Course as of 06/29/23 2301   Thu Jun 29, 2023 2149 Discussed with neuro  Low suspicion for CVA given minimal risk factors  However, will likely need MRI   2200 WBC(!): 10 69   2200 Hemoglobin: 12 0   2200 Notified by CT tech that pt is refusing contrast  Explained risks of refusing scan, including missing something that could ultimately lead to death or severe disability  They will speak with patient   8052 Discussed with neuro  Nothing acute on CT  Admit under stroke pathway   2259 Discussed with FM   [address]                  Stroke Assessment     Row Name 06/29/23 2155             NIH Stroke Scale    Interval Baseline      Level of Consciousness (1a ) 0      LOC Questions (1b ) 0      LOC Commands (1c ) 0      Best Gaze (2 ) 0      Visual (3 ) 0      Facial Palsy (4 ) 0      Motor Arm, Left (5a ) 0      Motor Arm, Right (5b ) 0      Motor Leg, Left (6a ) 0      Motor Leg, Right (6b ) 0      Limb Ataxia (7 ) 0      Sensory (8 ) 1      Best Language (9 ) 0      Dysarthria (10 ) 0      Extinction and Inattention (11 ) (Formerly Neglect) 0      Total 1 Flowsheet Row Most Recent Value   Thrombolytic Decision Options    Thrombolytic Decision Patient not a candidate  Patient is not a candidate options Symptoms resolved/clearly non disabling  SBIRT 20yo+    Flowsheet Row Most Recent Value   Initial Alcohol Screen: US AUDIT-C     1  How often do you have a drink containing alcohol? 0 Filed at: 06/29/2023 2215   2  How many drinks containing alcohol do you have on a typical day you are drinking? 0 Filed at: 06/29/2023 2215   3b  FEMALE Any Age, or MALE 65+: How often do you have 4 or more drinks on one occassion? 0 Filed at: 06/29/2023 2215   Audit-C Score 0 Filed at: 06/29/2023 2215   KUMAR: How many times in the past year have you    Used an illegal drug or used a prescription medication for non-medical reasons? Never Filed at: 06/29/2023 2215                    Medical Decision Making  Patient is a 28 y o  female who presents to the ED for nausea, vomiting, and numbness  Patient is nontoxic, well-appearing  She is tachycardic and hypertensive but otherwise vitals are stable  On exam she has absent sensation to the right side of her face  She has decreased sensation to her right upper and right lower extremity  The rest of her neurologic exam is unremarkable  Numbness concerning for acute CVA versus TIA  EKG without evidence of STEMI or ischemia, fingerstick BS not hypoglycemic, and clinical picture does not suggest other stroke mimic  I considered, but think less likely that symptoms are secondary to a dissection, AMI, hypoglycemia, other metabolic derangement including hepatic/uremic encephalopathy, medication side effect, or post-ictal Clayton's paralysis  Unclear etiology of intermittent nausea and vomiting  Differential includes gastroparesis, gastritis  Given timing of symptoms low suspicion for emergent causes of nausea and vomiting      Plan: CT CTA Head and Neck w/ and w/out contrast, stroke labs, EKG, Neurology stroke "consult                 Portions of the record may have been created with voice recognition software  Occasional wrong word or \"sound a like\" substitutions may have occurred due to the inherent limitations of voice recognition software  Read the chart carefully and recognize, using context, where substitutions have occurred  Right facial numbness: acute illness or injury  Amount and/or Complexity of Data Reviewed  Labs: ordered  Decision-making details documented in ED Course  Radiology: ordered  Risk  OTC drugs  Prescription drug management  Decision regarding hospitalization  Disposition  Final diagnoses:   Right facial numbness   Nausea and vomiting   Hypertension     Time reflects when diagnosis was documented in both MDM as applicable and the Disposition within this note     Time User Action Codes Description Comment    6/29/2023  9:46 PM Margareth Chung Add [R20 0] Right facial numbness     6/29/2023 10:58 PM Zuluaga Brazil Add [R11 2] Nausea and vomiting     6/29/2023 10:58 PM [facility]  Hypertension       ED Disposition     ED Disposition   Admit    Condition   Stable    Date/Time   Thu Jun 29, 2023 10:58 PM    Comment   Case was discussed with FM  and the patient's admission status was agreed to be Admission Status: observation status to the service of FM           Follow-up Information    None         Patient's Medications   Discharge Prescriptions    No medications on file       No discharge procedures on file      PDMP Review     None          ED Provider  Electronically Signed by           Khadijah Hunter DO  06/29/23 5526    "

## 2023-06-30 NOTE — SPEECH THERAPY NOTE
Speech Pathology Bedside Swallow Evaluation:          Summary:  Pt seen for bedside swallow evaluation in setting of stroke rule out  CT was unremarkable and MRI is pending  Pt admitted 2/2 report of R side facial weakness/numbness  Ipad  utilized for evaluation; pt's primary language [place]  Pt reports she feels R side facial sensation has improved and did not impact her speech or swallowing  Pt's conversational speech is clear and fluent  Pt able to follow simple and multi-step directions independently  Pt alert and oriented x4  Nursing reports no concerns regarding swallowing  Pt's oral mech/CN exam was unremarkable  Sensation intact bilaterally  Pt observed with thin liquids via straw sips with no overt s/s aspiration or apparent difficulty  Pt observed with regular solid (crackers) with functional mastication, adequate bite-strength, and no significant oral residue  Pt presents with no s/s suggestive of dysphagia at this time  Recommend continuing Regular/thin liquid diet  ST services not indicated  Please re-consult with any new concerns  Eval only, No f/u tx indicated  Vitals:    06/30/23 0245 06/30/23 0345 06/30/23 0545 06/30/23 0744   BP: (!) 172/110 (!) 172/110 147/94 157/94   BP Location: Left arm Left arm Left arm Left arm   Pulse: 95 102 85 96   Resp: 18 18 20 18   Temp: 98 2 °F (36 8 °C) 98 5 °F (36 9 °C) 97 5 °F (36 4 °C) 97 6 °F (36 4 °C)   TempSrc: Temporal Temporal Temporal Temporal   SpO2: 99% 100% 97% 99%   Weight:       Height:         Lab Results   Component Value Date    WBC 9 91 06/30/2023    HGB 10 8 (L) 06/30/2023    HCT 35 6 06/30/2023    MCV 73 (L) 06/30/2023     06/30/2023         Consider consult w/:  Nutrition    Goal(s):  Pt will tolerate least restrictive diet w/out s/s aspiration or oral/pharyngeal difficulties       H&P/Admit info/ pertinent provider notes: (PMH noted above)  Yoly Amador is a 28 y o  female with no significant PMH who presents with right sided numbness for the past hours  It started progressively, and was associated with decreased sensation of the arm and leg  No associated symptoms  No trouble recent trauma, LOC, swallowing, headaches, blurry vision, ringing in the ears  No chest pain, SOB, abdominal pain, diarrhea, constipation or urinary symptoms  Of note, patient also reports that she has had intermittent episodes of nausea and nbnb vomiting been present for the past 6 months, following an injury of the head at work  Special Studies:  No Chest XR results available for this patient  Previous VBS:  None     Patient's goal: none stated     Did the pt report pain? No   If yes, was nursing notified/was it addressed? Reason for consult:  R/o aspiration  Stroke protocol    Precautions:  Aspiration      Food allergies:   Allergies   Allergen Reactions   • Pollen Extract Other (See Comments)     Itchy eyes   • Topiramate Palpitations        Current diet:  Regular/thin    Premorbid diet:  Regular/thin    O2 requirements:  RA   Voice/Speech:  WNL   Social:  Independent    Follows commands:  Intact    Cognitive status:  Alert and oriented              Results d/w:  Pt, nursing, family, physician

## 2023-06-30 NOTE — ASSESSMENT & PLAN NOTE
NIH score of 1 on presentation  not a tpa candidate per neurology  S/p ASA load 325 mg  CT brain, CTA head/neck, MRI brain all unremarkable  Symptoms likely to be attributable to able to uncontrolled hypertension    Neurology reevaluated patient and deemed to not to be a true stroke event  Considering multiple vascular risk factors, recommended to continue  stroke/ cardiovascular risk prevention  Cleared for discharge to follow-up with PCP         PLAN:  - Aspirin 81 mg daily   - Lipitor 20 mg daily  - Follow-up with PCP

## 2023-06-30 NOTE — ED NOTES
"Pt refused CTA  Pt was explained the importance of the CTA by a Welsh speaking CT tech  Pt apparently said \"I don't have a clot\" and \"I think it's just my anxiety\"  Provider made aware          Linda Solo RN  06/29/23 4384    "

## 2023-06-30 NOTE — PLAN OF CARE
Problem: PAIN - ADULT  Goal: Verbalizes/displays adequate comfort level or baseline comfort level  Description: Interventions:  - Encourage patient to monitor pain and request assistance  - Assess pain using appropriate pain scale  - Administer analgesics based on type and severity of pain and evaluate response  - Implement non-pharmacological measures as appropriate and evaluate response  - Consider cultural and social influences on pain and pain management  - Notify physician/advanced practitioner if interventions unsuccessful or patient reports new pain  Outcome: Progressing     Problem: SAFETY ADULT  Goal: Patient will remain free of falls  Description: INTERVENTIONS:  - Educate patient/family on patient safety including physical limitations  - Instruct patient to call for assistance with activity   - Consult OT/PT to assist with strengthening/mobility   - Keep Call bell within reach  - Keep bed low and locked with side rails adjusted as appropriate  - Keep care items and personal belongings within reach  - Initiate and maintain comfort rounds  - Make Fall Risk Sign visible to staff  - Apply yellow socks and bracelet for high fall risk patients  - Consider moving patient to room near nurses station  Outcome: Progressing  Goal: Maintain or return to baseline ADL function  Description: INTERVENTIONS:  -  Assess patient's ability to carry out ADLs; assess patient's baseline for ADL function and identify physical deficits which impact ability to perform ADLs (bathing, care of mouth/teeth, toileting, grooming, dressing, etc )  - Assess/evaluate cause of self-care deficits   - Assess range of motion  - Assess patient's mobility; develop plan if impaired  - Assess patient's need for assistive devices and provide as appropriate  - Encourage maximum independence but intervene and supervise when necessary  - Involve family in performance of ADLs  - Assess for home care needs following discharge   - Consider OT consult to assist with ADL evaluation and planning for discharge  - Provide patient education as appropriate  Outcome: Progressing  Goal: Maintains/Returns to pre admission functional level  Description: INTERVENTIONS:  - Perform BMAT or MOVE assessment daily    - Set and communicate daily mobility goal to care team and patient/family/caregiver  - Collaborate with rehabilitation services on mobility goals if consulted  - Perform Range of Motion 2 times a day  - Ambulate patient 2 times a day  - Out of bed to chair 2 times a day   - Out of bed for meals 2 times a day  - Out of bed for toileting  - Record patient progress and toleration of activity level   Outcome: Progressing     Problem: DISCHARGE PLANNING  Goal: Discharge to home or other facility with appropriate resources  Description: INTERVENTIONS:  - Identify barriers to discharge w/patient and caregiver  - Arrange for needed discharge resources and transportation as appropriate  - Identify discharge learning needs (meds, wound care, etc )  - Arrange for interpretive services to assist at discharge as needed  - Refer to Case Management Department for coordinating discharge planning if the patient needs post-hospital services based on physician/advanced practitioner order or complex needs related to functional status, cognitive ability, or social support system  Outcome: Progressing     Problem: Knowledge Deficit  Goal: Patient/family/caregiver demonstrates understanding of disease process, treatment plan, medications, and discharge instructions  Description: Complete learning assessment and assess knowledge base    Interventions:  - Provide teaching at level of understanding  - Provide teaching via preferred learning methods  Outcome: Progressing     Problem: Neurological Deficit  Goal: Neurological status is stable or improving  Description: Interventions:  - Monitor and assess patient's level of consciousness, motor function, sensory function, and level of assistance needed for ADLs  - Monitor and report changes from baseline  Collaborate with interdisciplinary team to initiate plan and implement interventions as ordered  - Provide and maintain a safe environment  - Consider seizure precautions  - Consider fall precautions  - Consider aspiration precautions  - Consider bleeding precautions  Outcome: Progressing     Problem: Activity Intolerance/Impaired Mobility  Goal: Mobility/activity is maintained at optimum level for patient  Description: Interventions:  - Assess and monitor patient  barriers to mobility and need for assistive/adaptive devices  - Assess patient's emotional response to limitations  - Collaborate with interdisciplinary team and initiate plans and interventions as ordered  - Encourage independent activity per ability   - Maintain proper body alignment  - Perform active/passive rom as tolerated/ordered  - Plan activities to conserve energy   - Turn patient as appropriate  Outcome: Progressing     Problem: Communication Impairment  Goal: Ability to express needs and understand communication  Description: Assess patient's communication skills and ability to understand information  Patient will demonstrate use of effective communication techniques, alternative methods of communication and understanding even if not able to speak  - Encourage communication and provide alternate methods of communication as needed  - Collaborate with case management/ for discharge needs  - Include patient/family/caregiver in decisions related to communication  Outcome: Progressing     Problem: Potential for Aspiration  Goal: Non-ventilated patient's risk of aspiration is minimized  Description: Assess and monitor vital signs, respiratory status, and labs (WBC)  Monitor for signs of aspiration (tachypnea, cough, rales, wheezing, cyanosis, fever)  - Assess and monitor patient's ability to swallow    - Place patient up in chair to eat if possible  - HOB up at 90 degrees to eat if unable to get patient up into chair   - Supervise patient during oral intake  - Instruct patient/ family to take small bites  - Instruct patient/ family to take small single sips when taking liquids  - Follow patient-specific strategies generated by speech pathologist   Outcome: Progressing  Goal: Ventilated patient's risk of aspiration is minimized  Description: Assess and monitor vital signs, respiratory status, airway cuff pressure, and labs (WBC)  Monitor for signs of aspiration (tachypnea, cough, rales, wheezing, cyanosis, fever)  - Elevate head of bed 30 degrees if patient has tube feeding   - Monitor tube feeding  Outcome: Progressing     Problem: Nutrition  Goal: Nutrition/Hydration status is improving  Description: Monitor and assess patient's nutrition/hydration status for malnutrition (ex- brittle hair, bruises, dry skin, pale skin and conjunctiva, muscle wasting, smooth red tongue, and disorientation)  Collaborate with interdisciplinary team and initiate plan and interventions as ordered  Monitor patient's weight and dietary intake as ordered or per policy  Utilize nutrition screening tool and intervene per policy  Determine patient's food preferences and provide high-protein, high-caloric foods as appropriate  - Assist patient with eating   - Allow adequate time for meals   - Encourage patient to take dietary supplement as ordered  - Collaborate with clinical nutritionist   - Include patient/family/caregiver in decisions related to nutrition    Outcome: Progressing

## 2023-06-30 NOTE — H&P
History and Physical - Yuridia Alatorre    Patient Information: Alray Cabot 28 y o  female MRN: [id number]  Unit/Bed#: ED 03 Encounter: [id number]  Admitting Physician: Saad Thompson MD  PCP: Chris Orozco MD  Date of Admission:  06/29/23    Assessment and Plan    * Stroke Rule Out  Assessment & Plan  NIH score of 1 for sensation  No history of autoimmune disorders  No history of strokes  Hypertensive on arrival, other VS within normal limits  CT head: No acute intracranial abnormalities  Not a TPA candidate per Neurology  S/p ASA load 325 mg       Plan:  - ASA 81 daily  - Lipitor 20, adjust as needed to keep LDL <70 HbA1C goal <6 5  - CTA pending read   - MRI brain for completion of stroke workup  - Neurology consulted  - Echo cardio gram for stroke work up will dc if MRI is negative   - Follow up on A1c,TSH , and lipid panel   - PT/OT  - Speech evaluation   - Consider echocardiography      Hypertension  Assessment & Plan  BP Readings from Last 3 Encounters:   06/29/23 160/94   06/05/23 140/90   05/04/23 132/88     Not at goal    Was previously on medication but DC in PCP office  Plan:   - Will continue to monitor blood pressure  - Allow for permisive hypertention SBP < 220 Gradually return to normotension after 24-48 hrs  - Low sodium diet         VTE Prophylaxis: VTE Score: 6 Moderate Risk (Score 3-4) - Pharmacological DVT Prophylaxis Ordered: enoxaparin (Lovenox)  Code Status: Level 1 - Full Code  Anticipated Length of Stay:  Patient will be admitted on an Inpatient basis with an anticipated length of stay of  less than 2 midnights  Justification for Hospital Stay: Stroke rule out   Total Time for Visit, including Counseling / Coordination of Care: 45 mins  Greater than 50% of this total time spent on direct patient counseling and coordination of care    Patient Information Sharing: With the consent of Alray Cabot , their loved ones ( [place]) were notified today by inpatient team of the patient’s condition and current plan  All questions answered  Geovany Sales does not wish inpatient team to notify their loved ones of their condition and current plan  Chief Complaint:     Chief Complaint   Patient presents with   • Vomiting     Pt c/o of n/v since work accident back in September '22    • Numbness     R sided numbness that reported started 45 mins pta  History of Present Illness:    Geovany Sales is a 28 y o  female with no significant PMH who presents with right sided numbness for the past hours  It started progressively, and was associated with decreased sensation of the arm and leg  No associated symptoms  No trouble recent trauma, LOC, swallowing, headaches, blurry vision, ringing in the ears  No chest pain, SOB, abdominal pain, diarrhea, constipation or urinary symptoms  Of note, patient also reports that she has had intermittent episodes of nausea and nbnb vomiting been present for the past 6 months, following an injury of the head at work  ED course:   - VS on arrival: /107, Pulse 107 SpO2 100 %, Resp 20, Temp 98 5 F  - Labs: Leukocytosis 10 69, hypokalemia 3 3, otherwise unremarkable  - CT brain: No acute intracranial abnormalities  CTA pending    - Medications: Aspirin 325 mg    Patient will be admitted to [address] [place] [address] for stroke rule out  Review of Systems:  Review of Systems   Constitutional: Negative for chills and fever  HENT: Negative for ear pain and sore throat  Eyes: Negative for pain and visual disturbance  Respiratory: Negative for cough and shortness of breath  Cardiovascular: Negative for chest pain and palpitations  Gastrointestinal: Negative for abdominal pain and vomiting  Genitourinary: Negative for dysuria and hematuria  Musculoskeletal: Negative for arthralgias and back pain  Skin: Negative for color change and rash  Neurological: Positive for numbness  Negative for dizziness, tremors, seizures, syncope, facial asymmetry, speech difficulty, weakness, light-headedness and headaches  All other systems reviewed and are negative  Past Medical and Surgical History:   Past Medical History:   Diagnosis Date   • Diabetes mellitus ([facility] [address] )    • Hypertension      History reviewed  No pertinent surgical history  Meds/Allergies: Allergies: Allergies   Allergen Reactions   • Pollen Extract Other (See Comments)     Itchy eyes   • Topiramate Palpitations     Prior to Admission Medications   Prescriptions Last Dose Informant Patient Reported? Taking?    Blood Pressure KIT Not Taking  No No   Sig: Use 2 (two) times a day   Patient not taking: Reported on 6/29/2023   ferrous sulfate 324 (65 Fe) mg   No No   Sig: Take 1 tablet (324 mg total) by mouth daily before breakfast   miconazole (MONISTAT-7) 2 % vaginal cream Not Taking  No No   Sig: Insert 1 applicator into the vagina daily at bedtime   Patient not taking: Reported on 6/29/2023      Facility-Administered Medications: None     Social History:     Social History     Socioeconomic History   • Marital status: /Civil Union     Spouse name: Not on file   • Number of children: Not on file   • Years of education: Not on file   • Highest education level: Not on file   Occupational History   • Not on file   Tobacco Use   • Smoking status: Never     Passive exposure: Never   • Smokeless tobacco: Never   Vaping Use   • Vaping Use: Never used   Substance and Sexual Activity   • Alcohol use: Yes     Comment: once/month   • Drug use: Never   • Sexual activity: Yes     Partners: Male     Birth control/protection: None   Other Topics Concern   • Not on file   Social History Narrative   • Not on file     Social Determinants of Health     Financial Resource Strain: Low Risk  (3/23/2022)    Overall Financial Resource Strain (CARDI)    • Difficulty of Paying Living Expenses: Not hard at all Food Insecurity: No Food Insecurity (3/23/2022)    Hunger Vital Sign    • Worried About Running Out of Food in the Last Year: Never true    • Ran Out of Food in the Last Year: Never true   Transportation Needs: No Transportation Needs (3/23/2022)    PRAPARE - Transportation    • Lack of Transportation (Medical): No    • Lack of Transportation (Non-Medical): No   Physical Activity: Not on file   Stress: Not on file   Social Connections: Not on file   Intimate Partner Violence: Not on file   Housing Stability: Low Risk  (3/23/2022)    Housing Stability Vital Sign    • Unable to Pay for Housing in the Last Year: No    • Number of Places Lived in the Last Year: 1    • Unstable Housing in the Last Year: No     Patient Pre-hospital Living Situation: Home  Patient Pre-hospital Level of Mobility: Self ambulating  Patient Pre-hospital Diet Restrictions: None    Family History:  Family History   Problem Relation Age of Onset   • Breast cancer Neg Hx    • Colon cancer Neg Hx    • Ovarian cancer Neg Hx    • Cancer Neg Hx        Physical Exam:   Vitals:   Blood Pressure: (!) 187/94 (06/29/23 2245)  Pulse: 94 (06/29/23 2245)  Temperature: 98 5 °F (36 9 °C) (06/29/23 2142)  Temp Source: Tympanic (06/29/23 2142)  Respirations: 16 (06/29/23 2215)  Weight - Scale: 99 3 kg (218 lb 14 7 oz) (06/29/23 2142)  SpO2: 100 % (06/29/23 2245)    Physical Exam  Vitals and nursing note reviewed  Constitutional:       General: She is not in acute distress  Appearance: Normal appearance  She is not toxic-appearing  HENT:      Head: Normocephalic and atraumatic  Right Ear: External ear normal       Left Ear: External ear normal       Nose: Nose normal  No congestion or rhinorrhea  Mouth/Throat:      Mouth: Mucous membranes are moist       Pharynx: Oropharynx is clear  Eyes:      General: No visual field deficit  Extraocular Movements: Extraocular movements intact        Pupils: Pupils are equal, round, and reactive to light    Neck:      Vascular: No carotid bruit  Cardiovascular:      Rate and Rhythm: Normal rate and regular rhythm  Pulses: Normal pulses  Heart sounds: Normal heart sounds  No murmur heard  No gallop  Pulmonary:      Effort: Pulmonary effort is normal  No respiratory distress  Breath sounds: Normal breath sounds  No stridor  No wheezing  Abdominal:      General: Abdomen is flat  Bowel sounds are normal  There is no distension  Palpations: Abdomen is soft  There is no mass  Tenderness: There is no abdominal tenderness  Hernia: No hernia is present  Musculoskeletal:         General: Normal range of motion  Cervical back: Normal range of motion and neck supple  Right lower leg: No edema  Left lower leg: No edema  Lymphadenopathy:      Cervical: No cervical adenopathy  Skin:     General: Skin is warm  Coloration: Skin is not jaundiced  Findings: No erythema or rash  Neurological:      General: No focal deficit present  Mental Status: She is alert and oriented to person, place, and time  GCS: GCS eye subscore is 4  GCS verbal subscore is 5  GCS motor subscore is 6  Cranial Nerves: No cranial nerve deficit, dysarthria or facial asymmetry  Sensory: Sensory deficit present  Motor: Motor function is intact  No weakness or tremor  Coordination: Coordination is intact  Romberg sign negative  Coordination normal       Gait: Gait is intact  Gait normal       Deep Tendon Reflexes:      Reflex Scores:       Tricep reflexes are 2+ on the right side and 2+ on the left side  Bicep reflexes are 2+ on the right side and 2+ on the left side  Brachioradialis reflexes are 2+ on the right side and 2+ on the left side  Patellar reflexes are 2+ on the right side and 2+ on the left side  Achilles reflexes are 2+ on the right side and 2+ on the left side       Comments: Right facial, upper and lower extremity "numbness         Lab Results: I have personally reviewed pertinent reports  Results from last 7 days   Lab Units 06/29/23 2149   WBC Thousand/uL 10 69*   HEMOGLOBIN g/dL 12 0   HEMATOCRIT % 39 8   PLATELETS Thousands/uL 328     Results from last 7 days   Lab Units 06/29/23 2149   POTASSIUM mmol/L 3 3*   CHLORIDE mmol/L 104   CO2 mmol/L 25   BUN mg/dL 19   CREATININE mg/dL 0 83   CALCIUM mg/dL 9 7   EGFR ml/min/1 73sq m 93     Results from last 7 days   Lab Units 06/29/23  2149   INR  0 93                            Invalid input(s): \"URIBILINOGEN\"          Imaging: I have personally reviewed pertinent reports  CT stroke alert brain    Result Date: 6/29/2023  Narrative: CT BRAIN - STROKE ALERT PROTOCOL INDICATION:   Stroke Alert  COMPARISON:  None  TECHNIQUE:  CT examination of the brain was performed  In addition to axial images, coronal reformatted images were created and submitted for interpretation  Radiation dose length product (DLP) for this visit:  856 mGy-cm   This examination, like all CT scans performed in the [facility], was performed utilizing techniques to minimize radiation dose exposure, including the use of iterative reconstruction and automated exposure control  IMAGE QUALITY:  Diagnostic  FINDINGS: PARENCHYMA:  No intracranial mass, mass effect or midline shift  No CT signs of acute infarction  No acute parenchymal hemorrhage  Normal intracranial vasculature  VENTRICLES AND EXTRA-AXIAL SPACES:  Normal for the patient's age  VISUALIZED ORBITS: Normal visualized orbits  PARANASAL SINUSES: Normal visualized paranasal sinuses  CALVARIUM AND EXTRACRANIAL SOFT TISSUES:   Normal      Impression: No acute intracranial abnormality  I personally discussed this study with Dr Patrick Chao on 6/29/2023 10:00 PM  Workstation performed: [id number]     AUTOMATED VISUAL FIELD, EXTENDED - OU - BOTH EYES    Result Date: 6/20/2023  Narrative: This result has an attachment that is not available   " Right Eye Threshold was 24-2  Strategy was TOP  Findings include (Superior nasal defects)  Left Eye Threshold was 24-2  Strategy was TOP  Findings include (Super-nasal defects)  Notes Interpretor used #[id number]  Bilateral defect  Papilledema and is established with neurology  Will repeat prior to follow up     OCT, OPTIC NERVE-POSTERIOR SEGMENT - OU - BOTH EYES    Result Date: 6/20/2023  Narrative: This result has an attachment that is not available  Right Eye Quality was good  Progression has been stable  Linear C/D ratio = 0 40  Vertical C/D ratio = 0 31  Temporal thickness was normal  Superior thickness was showing mild thickening  Nasal thickness was normal  Inferior thickness was showing mild thickening  Left Eye Quality was good  Progression has been stable  Linear C/D ratio = 0 43  Vertical C/D ratio = 0 43  Temporal thickness was normal  Superior thickness was showing mild thickening  Nasal thickness was normal  Inferior thickness was showing mild thickening  Notes Without change or progression from previous scan  Superior and inferior rNFL thickening both eyes       EKG, Pathology, and Other Studies Reviewed on Admission:   EKG  Result Date: 06/29/23  Impression: sinus rhythm, no acute ST/T wave abnormalities, normal intervals     Entire H&P was discussed with Dr Yolanda Garcia who agreed to what is noted above       Aly Malhotra MD  06/29/23  11:38 PM

## 2023-06-30 NOTE — PLAN OF CARE
Problem: PAIN - ADULT  Goal: Verbalizes/displays adequate comfort level or baseline comfort level  Description: Interventions:  - Encourage patient to monitor pain and request assistance  - Assess pain using appropriate pain scale  - Administer analgesics based on type and severity of pain and evaluate response  - Implement non-pharmacological measures as appropriate and evaluate response  - Consider cultural and social influences on pain and pain management  - Notify physician/advanced practitioner if interventions unsuccessful or patient reports new pain  Outcome: Progressing     Problem: SAFETY ADULT  Goal: Patient will remain free of falls  Description: INTERVENTIONS:  - Educate patient/family on patient safety including physical limitations  - Instruct patient to call for assistance with activity   - Consult OT/PT to assist with strengthening/mobility   - Keep Call bell within reach  - Keep bed low and locked with side rails adjusted as appropriate  - Keep care items and personal belongings within reach  - Initiate and maintain comfort rounds  - Make Fall Risk Sign visible to staff  - Apply yellow socks and bracelet for high fall risk patients  - Consider moving patient to room near nurses station  Outcome: Progressing  Goal: Maintain or return to baseline ADL function  Description: INTERVENTIONS:  -  Assess patient's ability to carry out ADLs; assess patient's baseline for ADL function and identify physical deficits which impact ability to perform ADLs (bathing, care of mouth/teeth, toileting, grooming, dressing, etc )  - Assess/evaluate cause of self-care deficits   - Assess range of motion  - Assess patient's mobility; develop plan if impaired  - Assess patient's need for assistive devices and provide as appropriate  - Encourage maximum independence but intervene and supervise when necessary  - Involve family in performance of ADLs  - Assess for home care needs following discharge   - Consider OT consult to assist with ADL evaluation and planning for discharge  - Provide patient education as appropriate  Outcome: Progressing  Goal: Maintains/Returns to pre admission functional level  Description: INTERVENTIONS:  - Perform BMAT or MOVE assessment daily    - Set and communicate daily mobility goal to care team and patient/family/caregiver  - Collaborate with rehabilitation services on mobility goals if consulted  - Out of bed for toileting  - Record patient progress and toleration of activity level   Outcome: Progressing     Problem: DISCHARGE PLANNING  Goal: Discharge to home or other facility with appropriate resources  Description: INTERVENTIONS:  - Identify barriers to discharge w/patient and caregiver  - Arrange for needed discharge resources and transportation as appropriate  - Identify discharge learning needs (meds, wound care, etc )  - Arrange for interpretive services to assist at discharge as needed  - Refer to Case Management Department for coordinating discharge planning if the patient needs post-hospital services based on physician/advanced practitioner order or complex needs related to functional status, cognitive ability, or social support system  Outcome: Progressing     Problem: Knowledge Deficit  Goal: Patient/family/caregiver demonstrates understanding of disease process, treatment plan, medications, and discharge instructions  Description: Complete learning assessment and assess knowledge base  Interventions:  - Provide teaching at level of understanding  - Provide teaching via preferred learning methods  Outcome: Progressing     Problem: Neurological Deficit  Goal: Neurological status is stable or improving  Description: Interventions:  - Monitor and assess patient's level of consciousness, motor function, sensory function, and level of assistance needed for ADLs  - Monitor and report changes from baseline   Collaborate with interdisciplinary team to initiate plan and implement interventions as ordered  - Provide and maintain a safe environment  - Consider seizure precautions  - Consider fall precautions  - Consider aspiration precautions  - Consider bleeding precautions  Outcome: Progressing     Problem: Activity Intolerance/Impaired Mobility  Goal: Mobility/activity is maintained at optimum level for patient  Description: Interventions:  - Assess and monitor patient  barriers to mobility and need for assistive/adaptive devices  - Assess patient's emotional response to limitations  - Collaborate with interdisciplinary team and initiate plans and interventions as ordered  - Encourage independent activity per ability   - Maintain proper body alignment  - Perform active/passive rom as tolerated/ordered  - Plan activities to conserve energy   - Turn patient as appropriate  Outcome: Progressing     Problem: Activity Intolerance/Impaired Mobility  Goal: Mobility/activity is maintained at optimum level for patient  Description: Interventions:  - Assess and monitor patient  barriers to mobility and need for assistive/adaptive devices  - Assess patient's emotional response to limitations  - Collaborate with interdisciplinary team and initiate plans and interventions as ordered  - Encourage independent activity per ability   - Maintain proper body alignment  - Perform active/passive rom as tolerated/ordered  - Plan activities to conserve energy   - Turn patient as appropriate  Outcome: Progressing     Problem: Nutrition  Goal: Nutrition/Hydration status is improving  Description: Monitor and assess patient's nutrition/hydration status for malnutrition (ex- brittle hair, bruises, dry skin, pale skin and conjunctiva, muscle wasting, smooth red tongue, and disorientation)  Collaborate with interdisciplinary team and initiate plan and interventions as ordered  Monitor patient's weight and dietary intake as ordered or per policy  Utilize nutrition screening tool and intervene per policy   Determine patient's food preferences and provide high-protein, high-caloric foods as appropriate  - Assist patient with eating   - Allow adequate time for meals   - Encourage patient to take dietary supplement as ordered  - Collaborate with clinical nutritionist   - Include patient/family/caregiver in decisions related to nutrition  Outcome: Progressing     Problem: Potential for Aspiration  Goal: Non-ventilated patient's risk of aspiration is minimized  Description: Assess and monitor vital signs, respiratory status, and labs (WBC)  Monitor for signs of aspiration (tachypnea, cough, rales, wheezing, cyanosis, fever)  - Assess and monitor patient's ability to swallow  - Place patient up in chair to eat if possible  - HOB up at 90 degrees to eat if unable to get patient up into chair   - Supervise patient during oral intake  - Instruct patient/ family to take small bites  - Instruct patient/ family to take small single sips when taking liquids  - Follow patient-specific strategies generated by speech pathologist   Outcome: Progressing  Goal: Ventilated patient's risk of aspiration is minimized  Description: Assess and monitor vital signs, respiratory status, airway cuff pressure, and labs (WBC)  Monitor for signs of aspiration (tachypnea, cough, rales, wheezing, cyanosis, fever)  - Elevate head of bed 30 degrees if patient has tube feeding   - Monitor tube feeding  Outcome: Progressing     Problem: Potential for Aspiration  Goal: Non-ventilated patient's risk of aspiration is minimized  Description: Assess and monitor vital signs, respiratory status, and labs (WBC)  Monitor for signs of aspiration (tachypnea, cough, rales, wheezing, cyanosis, fever)  - Assess and monitor patient's ability to swallow  - Place patient up in chair to eat if possible  - HOB up at 90 degrees to eat if unable to get patient up into chair   - Supervise patient during oral intake     - Instruct patient/ family to take small bites   - Instruct patient/ family to take small single sips when taking liquids  - Follow patient-specific strategies generated by speech pathologist   Outcome: Progressing  Goal: Ventilated patient's risk of aspiration is minimized  Description: Assess and monitor vital signs, respiratory status, airway cuff pressure, and labs (WBC)  Monitor for signs of aspiration (tachypnea, cough, rales, wheezing, cyanosis, fever)  - Elevate head of bed 30 degrees if patient has tube feeding   - Monitor tube feeding    Outcome: Progressing

## 2023-06-30 NOTE — ASSESSMENT & PLAN NOTE
BP Readings from Last 3 Encounters:   06/30/23 (!) 154/104   06/05/23 140/90   05/04/23 132/88     BP previouslly controlled on no medication  Could attribute elevated BP to acute setting versus anxiousness  Elevated BP could be reason for presentation on admission since stroke ruled out  Given elevated BP in multiple settings including clinic, antihypertensive will have to be initiated  Plan   -Start losartan 25 mg  -BP Kit    Record blood pressure every other day and return log to PCP  -Follow-up albumin/creatinine ratio

## 2023-06-30 NOTE — QUICK NOTE
Stroke alert called at 9:46PM  Neurology response at 9:46PM    Brief HPI: 54-year-old female with a medical history significant for hypertension and diabetes who presented with nausea, vomiting, and right-sided numbness over the face, arm, and leg  Patient reported that her symptoms started approximately 45 minutes prior to arrival   No prior symptoms similar to this in the past   While in the ED, the patient initially refused contrast for CTA, but was later agreeable  Last known well: 9 PM  NIHSS 1    CTH -no acute findings  CTA -no large vessel occlusion    IV thrombolysis was not given due to low NIHSS with nondisabling symptoms  Recommend loading aspirin 325 mg times once, 81 mg daily  Goal map greater than 100, SBP less than 210  Admit to stroke pathway    I discussed this case with the managing team from a remote location  I did not personally see or examine this patient   I have provided limited recommendations as above, but ultimate patient disposition as per managing team

## 2023-06-30 NOTE — NURSING NOTE
All belonging returned to patient  IV removed  Patient in no distress and has not complaints at this time  Education paperwork provided to patient with verbal acknowledgement of understanding  Patient escorted to lobby walking with RN and spouse for drive home

## 2023-06-30 NOTE — TELEMEDICINE
REQUIRED DOCUMENTATION:     1  This service was provided via Telemedicine  2  Patient located at [address] [place] [address]  3  TeleMed provider: Shelly Chase MD   4  Identify all parties in room with patient during tele consult:  Patient,   5  Patient was then informed that this was a Telemedicine visit and that the exam was being conducted confidentially over secure lines  My office door was closed  No one else was in the room  Patient acknowledged consent and understanding of privacy and security of the Telemedicine visit, and gave us permission to have the assistant stay in the room in order to assist with the history and to conduct the exam   I informed the patient that I have reviewed their record in Epic and presented the opportunity for them to ask any questions regarding the visit today  The patient agreed to participate  Consulted by: Suki Espinoza DO       Assessment and Recommendations:    No new Assessment & Plan notes have been filed under this hospital service since the last note was generated  Service: Neurology    Stroke mimic, likely secondary to uncontrolled hypertension/ hypertensive emergency  Was started on  load followed by 81  Considering multiple vascular risk factors, would continue for stroke/ cardiovascular risk prevention      Would recommend to start Liptior 20  HbA1C goal <6 5    MRI brain showed  No acute ischemia  Unremarkable MRI of the brain  Stroke workup echo completed    BP goal, ok to gradually return to normotension  Long term BP goal <140/90 while avoiding fluctuations  PT/ OT    Highly recommend to Call 911, if have any stroke like symptoms in future  Nayely Zelaya will need follow up in in 6 weeks with general attending or advance practitioner  She will not require outpatient neurological testing  History/Data Review:   This is a 77-year-old female with a underlying hypertension and diabetes, who presented on evening of 6/29 "with nausea, vomiting, and right-sided numbness over the face, arm, and leg; for which she was stroke alerted  No prior history of stroke  Her symptoms started approximately 45 minutes prior to arrival   Neurology on call consulted, patient not a TNK candidate due to very low NIHSS  BP on arrival as high as 187/94          Vitals: Blood pressure (!) 154/104, pulse 93, temperature (!) 97 4 °F (36 3 °C), temperature source Temporal, resp  rate 18, height 5' 7\" (1 702 m), weight 101 kg (223 lb), last menstrual period 06/01/2023, SpO2 97 %  ,Body mass index is 34 93 kg/m²  Lab, Imaging and other studies: I have personally reviewed pertinent reports    , CBC:   Results from last 7 days   Lab Units 06/30/23  0504 06/29/23  2149   WBC Thousand/uL 9 91 10 69*   RBC Million/uL 4 90 5 49*   HEMOGLOBIN g/dL 10 8* 12 0   HEMATOCRIT % 35 6 39 8   MCV fL 73* 73*   PLATELETS Thousands/uL 293 328   , BMP/CMP:   Results from last 7 days   Lab Units 06/30/23  0504 06/29/23  2149   SODIUM mmol/L 138 139   POTASSIUM mmol/L 3 7 3 3*   CHLORIDE mmol/L 103 104   CO2 mmol/L 26 25   BUN mg/dL 13 19   CREATININE mg/dL 0 74 0 83   CALCIUM mg/dL 9 3 9 7   EGFR ml/min/1 73sq m 107 93   , Vitamin B12:   , HgBA1C:   Results from last 7 days   Lab Units 06/30/23  0504   HEMOGLOBIN A1C % 5 7*   , TSH:   Results from last 7 days   Lab Units 06/29/23  2149   TSH 3RD GENERATON uIU/mL 4 444   , Coagulation:   Results from last 7 days   Lab Units 06/29/23  2149   INR  0 93   , Lipid Profile:   Results from last 7 days   Lab Units 06/30/23  0504   HDL mg/dL 43*   LDL CALC mg/dL 119*   TRIGLYCERIDES mg/dL 64   , Ammonia:   , Urinalysis:       Invalid input(s): \"URIBILINOGEN\", Drug Screen:   , Medication Drug Levels:       Invalid input(s): \"CARBAMAZEPINE\", \"LACOSAMIDE\", \"OXCARBAZEPINE\",     Available brain Imaging during admission including CT, MRI brains      Current Facility-Administered Medications:   •  aspirin chewable tablet 81 mg, 81 mg, Oral, " Daily, Melissa Farooq MD, 81 mg at 06/30/23 1000  •  atorvastatin (LIPITOR) tablet 40 mg, 40 mg, Oral, QPM, Melissa Farooq MD, 40 mg at 06/29/23 0373  •  enoxaparin (LOVENOX) subcutaneous injection 40 mg, 40 mg, Subcutaneous, Daily, Melissa Farooq MD  •  multi-electrolyte (ISOLYTE-S PH 7 4) bolus 1,000 mL, 1,000 mL, Intravenous, Once, Cullen Zarate DO  •  ondansetron [facility]) injection 4 mg, 4 mg, Intravenous, Once, Cullen Zarate DO    The purposes of this evaluation are for urgent/ emergent neurological assessment and management, while this patient is admitted at a remote hospital location with a very limited in-house neurologist availability  It complements, but does not substitute in-person evaluation by neurologist in hospital, or outpatient neurologist visit

## 2023-07-03 ENCOUNTER — TELEPHONE (OUTPATIENT)
Dept: FAMILY MEDICINE CLINIC | Facility: CLINIC | Age: 33
End: 2023-07-03

## 2023-07-03 NOTE — TELEPHONE ENCOUNTER
Please schedule TCM appointment. Verify how patient is feeling and if they are in need of anything before their visit. Please send appt date and patient questions/concerns to Brit to complete TCM.

## 2023-07-05 ENCOUNTER — TRANSITIONAL CARE MANAGEMENT (OUTPATIENT)
Dept: FAMILY MEDICINE CLINIC | Facility: CLINIC | Age: 33
End: 2023-07-05

## 2023-07-05 ENCOUNTER — HOSPITAL ENCOUNTER (EMERGENCY)
Facility: HOSPITAL | Age: 33
Discharge: HOME | End: 2023-07-05
Attending: EMERGENCY MEDICINE

## 2023-07-05 VITALS
OXYGEN SATURATION: 98 % | DIASTOLIC BLOOD PRESSURE: 87 MMHG | TEMPERATURE: 97.8 F | RESPIRATION RATE: 20 BRPM | WEIGHT: 228.62 LBS | HEART RATE: 94 BPM | BODY MASS INDEX: 35.81 KG/M2 | SYSTOLIC BLOOD PRESSURE: 150 MMHG

## 2023-07-05 DIAGNOSIS — M79.601 RIGHT ARM PAIN: Primary | ICD-10-CM

## 2023-07-05 LAB
EXT PREGNANCY TEST URINE: NEGATIVE
EXT. CONTROL: NORMAL

## 2023-07-05 PROCEDURE — 81025 URINE PREGNANCY TEST: CPT | Performed by: EMERGENCY MEDICINE

## 2023-07-05 RX ORDER — METHOCARBAMOL 500 MG/1
1000 TABLET, FILM COATED ORAL ONCE
Status: COMPLETED | OUTPATIENT
Start: 2023-07-05 | End: 2023-07-05

## 2023-07-05 RX ORDER — KETOROLAC TROMETHAMINE 30 MG/ML
30 INJECTION, SOLUTION INTRAMUSCULAR; INTRAVENOUS ONCE
Status: COMPLETED | OUTPATIENT
Start: 2023-07-05 | End: 2023-07-05

## 2023-07-05 RX ORDER — METHOCARBAMOL 500 MG/1
500 TABLET, FILM COATED ORAL 2 TIMES DAILY
Qty: 20 TABLET | Refills: 0 | Status: SHIPPED | OUTPATIENT
Start: 2023-07-05

## 2023-07-05 RX ADMIN — KETOROLAC TROMETHAMINE 30 MG: 30 INJECTION, SOLUTION INTRAMUSCULAR; INTRAVENOUS at 13:30

## 2023-07-05 RX ADMIN — METHOCARBAMOL TABLETS 1000 MG: 500 TABLET, COATED ORAL at 13:29

## 2023-07-05 NOTE — ED TRIAGE NOTES
Reports she took her BP meds at 1130 and then an ASA 81mg at 1205 and then about 15-20 minutes later she started with a cramp/pulling pain on the Rt side of the face and in the arm. Reports she has a slight headache on both sides. Denies dizziness, blurred vision or weakness.  Reports this feels similar to when she was seen here in June

## 2023-07-06 ENCOUNTER — TELEPHONE (OUTPATIENT)
Dept: PHYSICAL THERAPY | Facility: OTHER | Age: 33
End: 2023-07-06

## 2023-07-06 ENCOUNTER — OFFICE VISIT (OUTPATIENT)
Dept: FAMILY MEDICINE CLINIC | Facility: CLINIC | Age: 33
End: 2023-07-06

## 2023-07-06 ENCOUNTER — TRANSITIONAL CARE MANAGEMENT (OUTPATIENT)
Dept: FAMILY MEDICINE CLINIC | Facility: CLINIC | Age: 33
End: 2023-07-06

## 2023-07-06 VITALS
BODY MASS INDEX: 34.95 KG/M2 | TEMPERATURE: 97.6 F | HEIGHT: 67 IN | OXYGEN SATURATION: 98 % | WEIGHT: 222.7 LBS | HEART RATE: 87 BPM | SYSTOLIC BLOOD PRESSURE: 128 MMHG | DIASTOLIC BLOOD PRESSURE: 94 MMHG | RESPIRATION RATE: 19 BRPM

## 2023-07-06 DIAGNOSIS — R20.0 NUMBNESS AND TINGLING: ICD-10-CM

## 2023-07-06 DIAGNOSIS — D50.8 OTHER IRON DEFICIENCY ANEMIA: ICD-10-CM

## 2023-07-06 DIAGNOSIS — Z76.89 ENCOUNTER FOR SUPPORT AND COORDINATION OF TRANSITION OF CARE: Primary | ICD-10-CM

## 2023-07-06 DIAGNOSIS — R20.2 NUMBNESS AND TINGLING: ICD-10-CM

## 2023-07-06 PROCEDURE — 99214 OFFICE O/P EST MOD 30 MIN: CPT | Performed by: FAMILY MEDICINE

## 2023-07-06 NOTE — ASSESSMENT & PLAN NOTE
- Patient had an accident at work 9/26/2022 since then she has been having headaches, dizziness, nausea. - She presented to the ER last week with stroke-like symptoms : lost sensation on half face (right), numbness on face and right arm. She went back to the ER yesterday with numbness on right side of her face. - Imaging and lab studies were unremarkable  - Patient is feeling better today. She denies pain, she has numbness on the right side of the face when she is stressed or cooking ( associates it with  vapor)  -  She was discharged on atorvastatin 20 mg , Aspirin 10 mg, losartan 25 mg , She agreed to follow this management. - She has been feeling anxious, she doesn't like to check her blood pressure because she feels " desperate, and starts moving" - agitated.   - Patients mood - anxious  - She s on the waiting list for CBT for anxiety  -Recent TSH 6/29 4.4  - Px Hx of anemia Last hemoglobin 12 6/30  Ordered :  Vitamin D  Vitamin B12  CBC  Iron Panel    Physical Therapy - Concussion ( Last one yesterday)  Px will call to follow up on CBT appointment

## 2023-07-06 NOTE — PROGRESS NOTES
Name: Scott Weinstein      : 1990      MRN: [id number]  Encounter Provider: Neo English MD  Encounter Date: 2023   Encounter department: [address] [facility]     1. Encounter for support and coordination of transition of care  Assessment & Plan:  - Patient had an accident at work 2022 since then she has been having headaches, dizziness, nausea. - She presented to the ER last week with stroke-like symptoms : lost sensation on half face (right), numbness on face and right arm. She went back to the ER yesterday with numbness on right side of her face. - Imaging and lab studies were unremarkable  - Patient is feeling better today. She denies pain, she has numbness on the right side of the face when she is stressed or cooking ( associates it with  vapor)  -  She was discharged on atorvastatin 20 mg , Aspirin 10 mg, losartan 25 mg , She agreed to follow this management. - She has been feeling anxious, she doesn't like to check her blood pressure because she feels " desperate, and starts moving" - agitated. - Patients mood - anxious  - She s on the waiting list for CBT for anxiety  -Recent TSH  4.4  - Px Hx of anemia Last hemoglobin 12   Ordered :  Vitamin D  Vitamin B12  CBC  Iron Panel    Physical Therapy - Concussion ( Last one yesterday)  Px will call to follow up on CBT appointment      2. Numbness and tingling  -     Vitamin B12; Future  -     Folate; Future  -     Vitamin D 25 hydroxy; Future    3. Other iron deficiency anemia  -     CBC and differential; Future  -     Iron Panel (Includes Ferritin, Iron Sat%, Iron, and TIBC); Future         Subjective      28year old female presenting today for transition of care after ED hospitalization a week ago for numbness and tingling on right side of the face + arm. Discharge note diagnosis : stroke like symptoms, imaging studies and lab results were unremarkable.  Patient went back to the emergency room yesterday with numbness on her right face. Patient denies numbness at present moment, she refers it worsens when she is under stress or cooking. Numbness is not associated with weakness, back pain or neck pain. Patient has a history of anemia last blood work:  Hemoglobin: 6/30 12              Review of Systems   Constitutional: Negative. Negative for chills and fever. HENT: Negative. Negative for ear pain and sore throat. Eyes: Negative. Negative for pain and visual disturbance. Respiratory: Negative. Negative for cough and shortness of breath. Cardiovascular: Negative. Negative for chest pain and palpitations. Gastrointestinal: Negative. Negative for abdominal pain and vomiting. Endocrine: Negative. Genitourinary: Negative. Negative for dysuria and hematuria. Musculoskeletal: Negative. Negative for arthralgias, back pain, neck pain and neck stiffness. Skin: Negative for color change and rash. Neurological: Negative for seizures and syncope. Psychiatric/Behavioral: The patient is nervous/anxious. All other systems reviewed and are negative. Current Outpatient Medications on File Prior to Visit   Medication Sig   • aspirin 81 mg chewable tablet Chew 1 tablet (81 mg total) daily Do not start before July 1, 2023.    • atorvastatin (LIPITOR) 20 mg tablet Take 1 tablet (20 mg total) by mouth every evening   • Blood Pressure KIT Use every other day   • ferrous sulfate 324 (65 Fe) mg Take 1 tablet (324 mg total) by mouth daily before breakfast   • losartan (COZAAR) 25 mg tablet Take 1 tablet (25 mg total) by mouth daily   • methocarbamol (ROBAXIN) 500 mg tablet Take 1 tablet (500 mg total) by mouth 2 (two) times a day       Objective     /94 (BP Location: Right arm, Patient Position: Sitting, Cuff Size: Standard)   Pulse 87   Temp 97.6 °F (36.4 °C) (Temporal)   Resp 19   Ht 5' 7" (1.702 m)   Wt 101 kg (222 lb 11.2 oz)   SpO2 98%   BMI 34.88 kg/m² Physical Exam  Constitutional:       Appearance: Normal appearance. HENT:      Head: Normocephalic. Mouth/Throat:      Mouth: Mucous membranes are moist.   Cardiovascular:      Rate and Rhythm: Normal rate and regular rhythm. Pulmonary:      Effort: Pulmonary effort is normal.      Breath sounds: Normal breath sounds. Abdominal:      Palpations: Abdomen is soft. Musculoskeletal:         General: Normal range of motion. Cervical back: Normal range of motion. Skin:     General: Skin is warm. Capillary Refill: Capillary refill takes less than 2 seconds. Neurological:      General: No focal deficit present. Mental Status: She is alert. Sensory: No sensory deficit. Motor: No weakness.       Coordination: Coordination normal.      Gait: Gait normal.   Psychiatric:         Mood and Affect: Mood normal.       Alice Kasper MD

## 2023-07-06 NOTE — PATIENT INSTRUCTIONS
Esta es roberto lista de consejeros que aceptan jerome seguro y hablan español:    MARY   ? [address]  [address], 630 [facility] [phone]; 500 [facility]   ? [facility], [address] [facility] 22[facility] [address]89 [facility]   ? [address] 74[address] [facility] 308 [facility], 350 [facility] [address] [facility] [address] [facility] [address]   ? Sitio 1: [place] 763 [facility], [address] [place] [address] [phone]   ? Sitio 2: 67[address] [facility] 60 [facility], [address]     OMNI   ? 110 [facility] 763 [facility], [address] [place] [address] 75 109 [facility], 630 [facility] [phone]    PREVENTIVE MEASURES   ? Sitio 1: 111 [facility], [address] [place] [address] [phone]        ? Sitio 2: 555 [facility], [address] [facility] [address]  ? [phone]    [address]          ? [phone]       ? 24/7 2420 [facility]         ? [phone]       ? 24/7 [facility]      [email or web address] com es un recurso para encontrar proveedores de psicoterapia, los pacientes pueden filtrar la lista de búsqueda de terapeutas en función de Hurricane serie de criterios que incluyen idioma, especialidad, género, [place], etc. Las personas que buscan deberán comunicarse con proveedores potenciales a través de la información en el directorio. Le recomendamos que se comunique con varios proveedores, dado que muchos proveedores tienen roberto lista de espera significativa para los servicios. [address]  es roberto organización de [place], [place] e individuos cuyas vidas rod sido afectadas por enfermedades mentales. Juntos, abogamos por roberto milla mejor para aquellas personas que tienen roberto enfermedad mental. Visite: paula-.org para obtener más información o para buscar recursos de apoyo locales, incluidos proveedores de reina mental calificados.     Rose es el número para la prevención del suicidio: #[phone]

## 2023-07-06 NOTE — TELEPHONE ENCOUNTER
Call placed to the patient per [facility] referral.    Spoke with patient (IN Syriac) , explained [facility] and reason for the call. Patient states she is already receiving PT for pain in her R-arm, neck, upper back (right sided). This is W/C related injury . Will close referral per protocol.

## 2023-07-07 NOTE — ED PROVIDER NOTES
History  Chief Complaint   Patient presents with   • Arm Pain       Arm Pain  Severity:  Moderate  Onset quality:  Gradual  Timing:  Constant  Progression:  Worsening  Chronicity:  New  Associated symptoms: no abdominal pain, no chest pain, no cough, no diarrhea, no fever, no headaches, no myalgias, no nausea, no rash, no rhinorrhea, no shortness of breath, no sore throat and no wheezing        Prior to Admission Medications   Prescriptions Last Dose Informant Patient Reported? Taking? Blood Pressure KIT   No No   Sig: Use every other day   aspirin 81 mg chewable tablet   No No   Sig: Chew 1 tablet (81 mg total) daily Do not start before July 1, 2023. atorvastatin (LIPITOR) 20 mg tablet   No No   Sig: Take 1 tablet (20 mg total) by mouth every evening   ferrous sulfate 324 (65 Fe) mg   No No   Sig: Take 1 tablet (324 mg total) by mouth daily before breakfast   losartan (COZAAR) 25 mg tablet   No No   Sig: Take 1 tablet (25 mg total) by mouth daily      Facility-Administered Medications: None       Past Medical History:   Diagnosis Date   • Diabetes mellitus ([address])    • Hypertension        History reviewed. No pertinent surgical history. Family History   Problem Relation Age of Onset   • Breast cancer Neg Hx    • Colon cancer Neg Hx    • Ovarian cancer Neg Hx    • Cancer Neg Hx      I have reviewed and agree with the history as documented. E-Cigarette/Vaping   • E-Cigarette Use Never User      E-Cigarette/Vaping Substances     Social History     Tobacco Use   • Smoking status: Never     Passive exposure: Never   • Smokeless tobacco: Never   Vaping Use   • Vaping Use: Never used   Substance Use Topics   • Alcohol use: Yes     Comment: once/month   • Drug use: Never       Review of Systems   Constitutional: Negative for chills and fever. HENT: Negative for rhinorrhea, sore throat and trouble swallowing. Eyes: Negative for pain. Respiratory: Negative for cough, shortness of breath, wheezing and stridor. Cardiovascular: Negative for chest pain and leg swelling. Gastrointestinal: Negative for abdominal pain, diarrhea and nausea. Endocrine: Negative for polyuria. Genitourinary: Negative for dysuria, flank pain and urgency. Musculoskeletal: Negative for joint swelling, myalgias and neck stiffness. Skin: Negative for rash. Allergic/Immunologic: Negative for immunocompromised state. Neurological: Negative for dizziness, syncope, weakness, numbness and headaches. Psychiatric/Behavioral: Negative for confusion and suicidal ideas. All other systems reviewed and are negative. Physical Exam  Physical Exam  Vitals and nursing note reviewed. Constitutional:       Appearance: Normal appearance. She is well-developed. HENT:      Head: Normocephalic and atraumatic. Nose: Nose normal.      Mouth/Throat:      Mouth: Mucous membranes are moist.   Eyes:      Extraocular Movements: Extraocular movements intact. Pupils: Pupils are equal, round, and reactive to light. Cardiovascular:      Rate and Rhythm: Normal rate and regular rhythm. Heart sounds: No murmur heard. No friction rub. Pulmonary:      Effort: No respiratory distress. Breath sounds: Normal breath sounds. No wheezing or rales. Abdominal:      General: Bowel sounds are normal. There is no distension. Palpations: Abdomen is soft. Tenderness: There is no abdominal tenderness. Musculoskeletal:         General: No tenderness. Normal range of motion. Cervical back: Normal range of motion and neck supple. Skin:     General: Skin is warm. Findings: No rash. Neurological:      Mental Status: She is alert and oriented to person, place, and time.    Psychiatric:         Mood and Affect: Mood normal.         Vital Signs  ED Triage Vitals   Temperature Pulse Respirations Blood Pressure SpO2   07/05/23 1303 07/05/23 1303 07/05/23 1303 07/05/23 1303 07/05/23 1303   97.8 °F (36.6 °C) 94 20 150/87 98 % Temp Source Heart Rate Source Patient Position - Orthostatic VS BP Location FiO2 (%)   07/05/23 1303 07/05/23 1303 07/05/23 1303 07/05/23 1303 --   Tympanic Monitor Sitting Left arm       Pain Score       07/05/23 1330       8           Vitals:    07/05/23 1303   BP: 150/87   Pulse: 94   Patient Position - Orthostatic VS: Sitting         Visual Acuity      ED Medications  Medications   ketorolac (TORADOL) injection 30 mg (30 mg Intramuscular Given 7/5/23 1330)   methocarbamol (ROBAXIN) tablet 1,000 mg (1,000 mg Oral Given 7/5/23 1329)       Diagnostic Studies  Results Reviewed     Procedure Component Value Units Date/Time    POCT pregnancy, urine [[id number]]  (Normal) Resulted: 07/05/23 1326    Lab Status: Final result Updated: 07/05/23 1326     EXT Preg Test, Ur Negative     Control Valid                 No orders to display              Procedures  Procedures         ED Course  ED Course as of 07/07/23 1356   Wed Jul 05, 2023   1405 Patient feels much improved at this point time after medication given in the emergency department full range of motion of the right upper extremity with no pain. 1408 Pt re-examined and evaluated after testing and treatment. Spoke with the patient and feeling improved and sxs have resolved. Will discharge home with close f/u with pcp and instructed to return to the ED if sxs worsen or continue. Pt agrees with the plan for discharge and feels comfortable to go home with proper f/u. Advised to return for worsening or additional problems. Diagnostic tests were reviewed and questions answered. Diagnosis, care plan and treatment options were discussed. The patient understand instructions and will follow up as directed.   Counseling: I had a detailed discussion with the patient and/or guardian regarding: the historical points, exam findings, and any diagnostic results supporting the discharge diagnosis, lab results, radiology results, discharge instructions reviewed with patient and/or family/caregiver and understanding was verbalized. Instructions given to return to the emergency department if symptoms worsen or persist, or if there are any questions or concerns that arise at home. All labs reviewed and utilized in the medical decision making process  All radiology studies independently viewed by me and interpreted by the radiologist.                               SBIRT 20yo+    Flowsheet Row Most Recent Value   Initial Alcohol Screen: US AUDIT-C     1. How often do you have a drink containing alcohol? 0 Filed at: 07/05/2023 1336   2. How many drinks containing alcohol do you have on a typical day you are drinking? 0 Filed at: 07/05/2023 1336   3a. Male UNDER 65: How often do you have five or more drinks on one occasion? 0 Filed at: 07/05/2023 1336   3b. FEMALE Any Age, or MALE 65+: How often do you have 4 or more drinks on one occassion? 0 Filed at: 07/05/2023 1336   Audit-C Score 0 Filed at: 07/05/2023 1336   KUMAR: How many times in the past year have you. .. Used an illegal drug or used a prescription medication for non-medical reasons? Never Filed at: 07/05/2023 1336                    Medical Decision Making  This is a 70-year-old female presents emergency department with right-sided facial cramping and pain in the right cervical area been present for several hours duration noted that started after taking her morning medications of blood pressure was recently admitted to the hospital for evaluation and observation for possible strokelike symptoms with a normal MRI. During examination patient has a completely normal neurological exam with noted tenderness to palpation overlying the cervical spinal processes and cervical musculature on the right-hand side. Suspect possible muscle strain, cervical strain, spasm, treatment at this point time will be NSAIDs, muscle relaxers,    Pt re-examined and evaluated after testing and treatment.  Spoke with the patient and feeling improved and sxs have resolved. Will discharge home with close f/u with pcp and instructed to return to the ED if sxs worsen or continue. Pt agrees with the plan for discharge and feels comfortable to go home with proper f/u. Advised to return for worsening or additional problems. Diagnostic tests were reviewed and questions answered. Diagnosis, care plan and treatment options were discussed. The patient understand instructions and will follow up as directed. Counseling:there was a  detailed discussion with the patient and/or guardian regarding: the historical points, exam findings, and any diagnostic results supporting the discharge diagnosis, lab results, radiology results, discharge instructions reviewed with patient and/or family/caregiver and understanding was verbalized. Instructions given to return to the emergency department if symptoms worsen or persist, or if there are any questions or concerns that arise at home. All labs reviewed and utilized in the medical decision making process    All radiology studies independently viewed by me and interpreted by the radiologist.      Amount and/or Complexity of Data Reviewed  Labs: ordered. Risk  Prescription drug management. Disposition  Final diagnoses:   Right arm pain     Time reflects when diagnosis was documented in both MDM as applicable and the Disposition within this note     Time User Action Codes Description Comment    7/5/2023  2:08 PM Melanie Del Cid Add [U25.795] Right arm pain       ED Disposition     ED Disposition   Discharge    Condition   Stable    Date/Time   Wed Jul 5, 2023  2:08 PM    Comment   [facility] discharge to home/self care.                Follow-up Information    None         Discharge Medication List as of 7/5/2023  2:09 PM      START taking these medications    Details   methocarbamol (ROBAXIN) 500 mg tablet Take 1 tablet (500 mg total) by mouth 2 (two) times a day, Starting Wed 7/5/2023, Normal         CONTINUE these medications which have NOT CHANGED    Details   aspirin 81 mg chewable tablet Chew 1 tablet (81 mg total) daily Do not start before July 1, 2023., Starting Sat 7/1/2023, Until Fri 9/29/2023, Normal      atorvastatin (LIPITOR) 20 mg tablet Take 1 tablet (20 mg total) by mouth every evening, Starting Fri 6/30/2023, Until Thu 9/28/2023, Normal      Blood Pressure KIT Use every other day, Starting Fri 6/30/2023, Normal      ferrous sulfate 324 (65 Fe) mg Take 1 tablet (324 mg total) by mouth daily before breakfast, Starting Wed 3/29/2023, Until Fri 4/28/2023, Normal      losartan (COZAAR) 25 mg tablet Take 1 tablet (25 mg total) by mouth daily, Starting Fri 6/30/2023, Normal                 PDMP Review     None          ED Provider  Electronically Signed by           Renetta Robert DO  07/07/23 3675

## 2023-07-21 ENCOUNTER — TELEPHONE (OUTPATIENT)
Dept: NEUROLOGY | Facility: CLINIC | Age: 33
End: 2023-07-21

## 2023-07-21 NOTE — TELEPHONE ENCOUNTER
1ST ATTEMPT,     Called pt no answer, LMOM. Thank you,     Rosalinda Friedman        [facility] HE[facility] / [id number] [facility]- 6/30/2023      Lety Black will need follow up in in 6 weeks with general attending or advance practitioner.  She will not require outpatient neurological testing.

## 2023-07-27 NOTE — TELEPHONE ENCOUNTER
3rd attempt,     Called Patient she stated she is a [facility] patient .       Thank you,     Carlos Enrique Hall

## 2023-07-28 ENCOUNTER — TELEPHONE (OUTPATIENT)
Dept: FAMILY MEDICINE CLINIC | Facility: CLINIC | Age: 33
End: 2023-07-28

## 2023-08-15 ENCOUNTER — TELEPHONE (OUTPATIENT)
Dept: FAMILY MEDICINE CLINIC | Facility: CLINIC | Age: 33
End: 2023-08-15

## 2023-08-15 NOTE — TELEPHONE ENCOUNTER
08/15/23    Pt left message requesting med refill      Spoke to Pt    Informed Pt the reason of my call    Pt requested to schedule instead 08/17/23 to f/u for her BP and Med

## 2023-08-17 ENCOUNTER — OFFICE VISIT (OUTPATIENT)
Dept: FAMILY MEDICINE CLINIC | Facility: CLINIC | Age: 33
End: 2023-08-17

## 2023-08-17 VITALS
SYSTOLIC BLOOD PRESSURE: 122 MMHG | RESPIRATION RATE: 16 BRPM | HEIGHT: 67 IN | OXYGEN SATURATION: 99 % | DIASTOLIC BLOOD PRESSURE: 80 MMHG | BODY MASS INDEX: 34.69 KG/M2 | WEIGHT: 221 LBS | HEART RATE: 79 BPM | TEMPERATURE: 97.7 F

## 2023-08-17 DIAGNOSIS — I63.9 STROKE (HCC): ICD-10-CM

## 2023-08-17 DIAGNOSIS — D50.9 IRON DEFICIENCY ANEMIA, UNSPECIFIED IRON DEFICIENCY ANEMIA TYPE: ICD-10-CM

## 2023-08-17 DIAGNOSIS — R29.90 STROKE-LIKE SYMPTOM: Primary | ICD-10-CM

## 2023-08-17 DIAGNOSIS — I10 HYPERTENSION: ICD-10-CM

## 2023-08-17 RX ORDER — ASPIRIN 81 MG/1
81 TABLET, CHEWABLE ORAL DAILY
Qty: 90 TABLET | Refills: 0 | Status: SHIPPED | OUTPATIENT
Start: 2023-08-17 | End: 2023-11-15

## 2023-08-17 RX ORDER — LOSARTAN POTASSIUM 25 MG/1
25 TABLET ORAL DAILY
Qty: 30 TABLET | Refills: 4 | Status: SHIPPED | OUTPATIENT
Start: 2023-08-17

## 2023-08-17 RX ORDER — FERROUS SULFATE TAB EC 324 MG (65 MG FE EQUIVALENT) 324 (65 FE) MG
324 TABLET DELAYED RESPONSE ORAL
Qty: 30 TABLET | Refills: 0 | Status: SHIPPED | OUTPATIENT
Start: 2023-08-17 | End: 2023-09-16

## 2023-08-17 RX ORDER — ATORVASTATIN CALCIUM 20 MG/1
20 TABLET, FILM COATED ORAL EVERY EVENING
Qty: 90 TABLET | Refills: 0 | Status: SHIPPED | OUTPATIENT
Start: 2023-08-17 | End: 2023-11-15

## 2023-08-17 NOTE — PROGRESS NOTES
Name: Nick Tejeda      : 1990      MRN: [id number]  Encounter Provider: Keely Gutierrez MD  Encounter Date: 2023   Encounter department: [address] [facility]     1. Stroke-like symptom  Assessment & Plan:  Assessment   NIH score of 1 on presentation. not a tpa per initial neurologic a/p  S/p ASA load 325 mg  CT brain, CTA head/neck, MRI brain all unremarkable. Symptoms likely to be attributable to able to uncontrolled hypertension  Neurology reevaluated patient and deemed to not to be a true stroke event. Considering multiple vascular risk factors, recommended to continue  stroke/ cardiovascular risk prevention. Cleared for discharge to follow-up with PCP on aspirin and Lipitor         PLAN  - Continue Aspirin 81 mg daily and Lipitor 20 mg daily  - Will order lime antibodies, RPR, to screen infectious processes with similar presentation   -Will order inflammatory markers to screen for inflammatory/autoimmune process with similar presentation like MS/Lupus etc.   - ED precution for symptoms recurrence. 2. Hypertension  Assessment & Plan:  Assessment:   · Blood pressure at today’s office visit 122/80 mmHg, controlled  · Blood Pressure goal as per JNC-8 less than 130/90 mmHg,   Currently on losartan 25 mg qd, compliant    Plan:   · Continue current treatment   · BP goals and possible side effects reviewed with patient, recommended to call the office/schedule appointment  if need prior to his next visit if needed   · The patient was educated on the importance of medication compliance and to monitor her blood pressure at home on a  daily basis. · Ideally in quiet room; after 5 minutes of rest, sited, legs uncrossed and in a relaxed environment. · Recommended to  bring BP diary in visit. · Will recommend performing aerobic exercise for at least more than 3 times per week or more that 150 min x week of moderate physical activity. · Will recommend sodium and fat reduction and to increase fruit consumption. · Reassess BP in +++      Orders:  -     losartan (COZAAR) 25 mg tablet; Take 1 tablet (25 mg total) by mouth daily  -     aspirin 81 mg chewable tablet; Chew 1 tablet (81 mg total) daily  -     atorvastatin (LIPITOR) 20 mg tablet; Take 1 tablet (20 mg total) by mouth every evening    3. Stroke Rule Out  -     aspirin 81 mg chewable tablet; Chew 1 tablet (81 mg total) daily  -     atorvastatin (LIPITOR) 20 mg tablet; Take 1 tablet (20 mg total) by mouth every evening  -     RPR-Syphilis Screening (Total Syphilis IGG/IGM); Future  -     Lime IgE; Future  -     C-reactive protein; Future  -     Sedimentation rate, automated; Future    4. Iron deficiency anemia, unspecified iron deficiency anemia type  -     ferrous sulfate 324 (65 Fe) mg; Take 1 tablet (324 mg total) by mouth daily before breakfast         Subjective      It was a pleasure to see Homa Yeboah is a 28 y.o.  female here for HTN follow up   Denies unintentional weight loss, fever, chills, fatigue, weakness, headaches, dizziness, rashes, blurred vision, nausea, palpitation, chest pain, SOB, abdominal pain, urinary changes, bowel changes, legs swelling/pain, sleep problems,  sick contacts or recent travel. Patient's medical conditions are stable unless noted otherwise above.  Patient reports compliance with her medications. Overall patient reports feeling well. Patient has no further complaints other than what is mentioned in the ROS. Hypertension  This is a chronic problem. The current episode started more than 1 year ago. The problem has been rapidly improving since onset. The problem is controlled. Pertinent negatives include no anxiety, blurred vision, chest pain, headaches, malaise/fatigue, neck pain, orthopnea, palpitations, peripheral edema, PND, shortness of breath or sweats. There are no associated agents to hypertension.  Risk factors for coronary artery disease include obesity and sedentary lifestyle. Past treatments include angiotensin blockers. The current treatment provides significant improvement. There are no compliance problems. Shoulder Pain   Pertinent negatives include no fever or numbness. Review of Systems   Constitutional: Negative for activity change, appetite change, chills, fatigue, fever and malaise/fatigue. HENT: Negative for congestion, postnasal drip, rhinorrhea, sneezing and sore throat. Eyes: Negative for blurred vision and visual disturbance. Respiratory: Negative for cough, chest tightness, shortness of breath and wheezing. Cardiovascular: Negative for chest pain, palpitations, orthopnea, leg swelling and PND. Gastrointestinal: Negative for abdominal distention, abdominal pain, blood in stool, constipation, diarrhea, nausea and vomiting. Genitourinary: Negative for difficulty urinating, dysuria, hematuria, menstrual problem, vaginal bleeding and vaginal discharge. Musculoskeletal: Negative for arthralgias, myalgias and neck pain. Skin: Negative for rash. Neurological: Negative for dizziness, syncope, weakness, light-headedness, numbness and headaches. Psychiatric/Behavioral: Negative for agitation, behavioral problems and suicidal ideas. Current Outpatient Medications on File Prior to Visit   Medication Sig   • Blood Pressure KIT Use every other day       Objective     /80 (BP Location: Right arm, Patient Position: Sitting, Cuff Size: Large)   Pulse 79   Temp 97.7 °F (36.5 °C) (Temporal)   Resp 16   Ht 5' 7" (1.702 m)   Wt 100 kg (221 lb)   LMP 08/10/2023 (Approximate)   SpO2 99%   BMI 34.61 kg/m²     Physical Exam  Vitals and nursing note reviewed. Constitutional:       General: She is awake. She is not in acute distress. Appearance: Normal appearance. She is well-developed and well-groomed. She is obese. She is not ill-appearing, toxic-appearing or diaphoretic.    HENT:      Head: Normocephalic and atraumatic. Nose: Nose normal.      Mouth/Throat:      Mouth: Mucous membranes are moist.      Pharynx: Oropharynx is clear. No oropharyngeal exudate or posterior oropharyngeal erythema. Eyes:      Extraocular Movements: Extraocular movements intact. Conjunctiva/sclera: Conjunctivae normal.      Pupils: Pupils are equal, round, and reactive to light. Cardiovascular:      Rate and Rhythm: Normal rate and regular rhythm. No extrasystoles are present. Pulses: Normal pulses. Radial pulses are 2+ on the right side and 2+ on the left side. Heart sounds: Normal heart sounds, S1 normal and S2 normal.   Pulmonary:      Effort: Pulmonary effort is normal.      Breath sounds: Normal breath sounds and air entry. Abdominal:      General: Bowel sounds are normal.      Palpations: Abdomen is soft. There is no mass. Tenderness: There is no abdominal tenderness. There is no right CVA tenderness, left CVA tenderness or guarding. Musculoskeletal:         General: Normal range of motion. Cervical back: Normal range of motion. Right lower leg: No edema. Left lower leg: No edema. Skin:     General: Skin is warm. Capillary Refill: Capillary refill takes less than 2 seconds. Coloration: Skin is not jaundiced or pale. Findings: No bruising, erythema, lesion or rash. Neurological:      General: No focal deficit present. Mental Status: She is alert. Psychiatric:         Behavior: Behavior is cooperative.        Yanira Yang MD

## 2023-08-18 ENCOUNTER — APPOINTMENT (OUTPATIENT)
Dept: LAB | Facility: CLINIC | Age: 33
End: 2023-08-18

## 2023-08-18 DIAGNOSIS — R20.2 NUMBNESS AND TINGLING: ICD-10-CM

## 2023-08-18 DIAGNOSIS — D50.8 OTHER IRON DEFICIENCY ANEMIA: ICD-10-CM

## 2023-08-18 DIAGNOSIS — I63.9 STROKE (HCC): ICD-10-CM

## 2023-08-18 DIAGNOSIS — R20.0 NUMBNESS AND TINGLING: ICD-10-CM

## 2023-08-18 LAB
25(OH)D3 SERPL-MCNC: 19.5 NG/ML (ref 30–100)
BASOPHILS # BLD AUTO: 0.04 THOUSANDS/ÂΜL (ref 0–0.1)
BASOPHILS NFR BLD AUTO: 1 % (ref 0–1)
CRP SERPL QL: 8.8 MG/L
EOSINOPHIL # BLD AUTO: 0.07 THOUSAND/ÂΜL (ref 0–0.61)
EOSINOPHIL NFR BLD AUTO: 1 % (ref 0–6)
ERYTHROCYTE [DISTWIDTH] IN BLOOD BY AUTOMATED COUNT: 18.8 % (ref 11.6–15.1)
ERYTHROCYTE [SEDIMENTATION RATE] IN BLOOD: 54 MM/HOUR (ref 0–19)
FERRITIN SERPL-MCNC: 6 NG/ML (ref 11–307)
FOLATE SERPL-MCNC: 11.6 NG/ML
HCT VFR BLD AUTO: 37.6 % (ref 34.8–46.1)
HGB BLD-MCNC: 11.1 G/DL (ref 11.5–15.4)
IMM GRANULOCYTES # BLD AUTO: 0.02 THOUSAND/UL (ref 0–0.2)
IMM GRANULOCYTES NFR BLD AUTO: 0 % (ref 0–2)
IRON SATN MFR SERPL: 8 % (ref 15–50)
IRON SERPL-MCNC: 32 UG/DL (ref 50–170)
LYMPHOCYTES # BLD AUTO: 3.31 THOUSANDS/ÂΜL (ref 0.6–4.47)
LYMPHOCYTES NFR BLD AUTO: 39 % (ref 14–44)
MCH RBC QN AUTO: 21.9 PG (ref 26.8–34.3)
MCHC RBC AUTO-ENTMCNC: 29.5 G/DL (ref 31.4–37.4)
MCV RBC AUTO: 74 FL (ref 82–98)
MONOCYTES # BLD AUTO: 0.83 THOUSAND/ÂΜL (ref 0.17–1.22)
MONOCYTES NFR BLD AUTO: 10 % (ref 4–12)
NEUTROPHILS # BLD AUTO: 4.31 THOUSANDS/ÂΜL (ref 1.85–7.62)
NEUTS SEG NFR BLD AUTO: 49 % (ref 43–75)
NRBC BLD AUTO-RTO: 0 /100 WBCS
PLATELET # BLD AUTO: 362 THOUSANDS/UL (ref 149–390)
PMV BLD AUTO: 11.6 FL (ref 8.9–12.7)
RBC # BLD AUTO: 5.06 MILLION/UL (ref 3.81–5.12)
TIBC SERPL-MCNC: 403 UG/DL (ref 250–450)
TREPONEMA PALLIDUM IGG+IGM AB [PRESENCE] IN SERUM OR PLASMA BY IMMUNOASSAY: NORMAL
VIT B12 SERPL-MCNC: 316 PG/ML (ref 180–914)
WBC # BLD AUTO: 8.58 THOUSAND/UL (ref 4.31–10.16)

## 2023-08-18 PROCEDURE — 83550 IRON BINDING TEST: CPT

## 2023-08-18 PROCEDURE — 82306 VITAMIN D 25 HYDROXY: CPT

## 2023-08-18 PROCEDURE — 86780 TREPONEMA PALLIDUM: CPT

## 2023-08-18 PROCEDURE — 86140 C-REACTIVE PROTEIN: CPT

## 2023-08-18 PROCEDURE — 82746 ASSAY OF FOLIC ACID SERUM: CPT

## 2023-08-18 PROCEDURE — 86003 ALLG SPEC IGE CRUDE XTRC EA: CPT

## 2023-08-18 PROCEDURE — 82607 VITAMIN B-12: CPT

## 2023-08-18 PROCEDURE — 85652 RBC SED RATE AUTOMATED: CPT

## 2023-08-18 PROCEDURE — 36415 COLL VENOUS BLD VENIPUNCTURE: CPT

## 2023-08-18 PROCEDURE — 82728 ASSAY OF FERRITIN: CPT

## 2023-08-18 PROCEDURE — 83540 ASSAY OF IRON: CPT

## 2023-08-18 PROCEDURE — 85025 COMPLETE CBC W/AUTO DIFF WBC: CPT

## 2023-08-21 ENCOUNTER — TELEPHONE (OUTPATIENT)
Dept: FAMILY MEDICINE CLINIC | Facility: CLINIC | Age: 33
End: 2023-08-21

## 2023-08-21 NOTE — TELEPHONE ENCOUNTER
Patient is aware of lab results, she is taking iron supplement. Denied symptoms, Explained alarm symptoms and asked her to schedule an appointment sooner if anemia becomes symptomatic.

## 2023-08-23 LAB — LIME IGE QN: <0.1 KU/L

## 2023-08-24 NOTE — ASSESSMENT & PLAN NOTE
Assessment:   · Blood pressure at today’s office visit 122/80 mmHg, controlled  · Blood Pressure goal as per JNC-8 less than 130/90 mmHg,   Currently on losartan 25 mg qd, compliant    Plan:   · Continue current treatment   · BP goals and possible side effects reviewed with patient, recommended to call the office/schedule appointment  if need prior to his next visit if needed   · The patient was educated on the importance of medication compliance and to monitor her blood pressure at home on a  daily basis. · Ideally in quiet room; after 5 minutes of rest, sited, legs uncrossed and in a relaxed environment. · Recommended to  bring BP diary in visit. · Will recommend performing aerobic exercise for at least more than 3 times per week or more that 150 min x week of moderate physical activity. · Will recommend sodium and fat reduction and to increase fruit consumption.     · Reassess BP in +++

## 2023-08-24 NOTE — ASSESSMENT & PLAN NOTE
Assessment   NIH score of 1 on presentation. not a tpa per initial neurologic a/p  S/p ASA load 325 mg  CT brain, CTA head/neck, MRI brain all unremarkable. Symptoms likely to be attributable to able to uncontrolled hypertension  Neurology reevaluated patient and deemed to not to be a true stroke event. Considering multiple vascular risk factors, recommended to continue  stroke/ cardiovascular risk prevention. Cleared for discharge to follow-up with PCP on aspirin and Lipitor         PLAN  - Continue Aspirin 81 mg daily and Lipitor 20 mg daily  - Will order lime antibodies, RPR, to screen infectious processes with similar presentation   -Will order inflammatory markers to screen for inflammatory/autoimmune process with similar presentation like MS/Lupus etc.   - ED precution for symptoms recurrence.

## 2023-09-18 ENCOUNTER — OFFICE VISIT (OUTPATIENT)
Dept: FAMILY MEDICINE CLINIC | Facility: CLINIC | Age: 33
End: 2023-09-18

## 2023-09-18 VITALS
HEART RATE: 85 BPM | TEMPERATURE: 97.2 F | SYSTOLIC BLOOD PRESSURE: 124 MMHG | RESPIRATION RATE: 16 BRPM | OXYGEN SATURATION: 99 % | BODY MASS INDEX: 35 KG/M2 | DIASTOLIC BLOOD PRESSURE: 80 MMHG | WEIGHT: 223 LBS | HEIGHT: 67 IN

## 2023-09-18 DIAGNOSIS — I10 HYPERTENSION, UNSPECIFIED TYPE: ICD-10-CM

## 2023-09-18 DIAGNOSIS — E55.9 VITAMIN D DEFICIENCY: ICD-10-CM

## 2023-09-18 DIAGNOSIS — D50.9 IRON DEFICIENCY ANEMIA, UNSPECIFIED IRON DEFICIENCY ANEMIA TYPE: Primary | ICD-10-CM

## 2023-09-18 DIAGNOSIS — I10 HYPERTENSION: ICD-10-CM

## 2023-09-18 DIAGNOSIS — K13.0 RASH ON LIPS: ICD-10-CM

## 2023-09-18 PROBLEM — B00.9 HERPES INFECTION: Status: ACTIVE | Noted: 2023-09-18

## 2023-09-18 PROCEDURE — 99214 OFFICE O/P EST MOD 30 MIN: CPT | Performed by: FAMILY MEDICINE

## 2023-09-18 RX ORDER — ERGOCALCIFEROL 1.25 MG/1
50000 CAPSULE ORAL WEEKLY
Qty: 8 CAPSULE | Refills: 0 | Status: SHIPPED | OUTPATIENT
Start: 2023-09-18

## 2023-09-18 RX ORDER — LOSARTAN POTASSIUM 25 MG/1
25 TABLET ORAL DAILY
Qty: 30 TABLET | Refills: 4 | Status: SHIPPED | OUTPATIENT
Start: 2023-09-18

## 2023-09-18 NOTE — ASSESSMENT & PLAN NOTE
Assessment:   • Blood pressure at today’s office visit 122/80 mmHg, controlled  • Blood Pressure goal as per JNC-8 less than 130/90 mmHg,   • Currently on losartan 25 mg qd, compliant  • Patient report no planing to become pregnant. • Offered alternative treatment due to her fertile age, but refuses stating that is doing well with losartan. • Benefits and risk discussed.       Plan:   • Continue current treatment   • BP goals and possible side effects reviewed with patient, recommended to call the office/schedule appointment  if need prior to his next visit if needed   • The patient was educated on the importance of medication compliance and to monitor her blood pressure at home on a  daily basis. • Recommended to  bring BP diary in visit. • Will recommend performing aerobic exercise for at least more than 3 times per week or more that 150 min x week of moderate physical activity. • Will recommend sodium and fat reduction and to increase fruit consumption. • Reassess BP in 3 months.

## 2023-09-18 NOTE — ASSESSMENT & PLAN NOTE
Assessment  · Vitamin-D deficiency less than 20 ng/mL, vitamin-D insufficiency 21-29 ng/mL  · Vitamin-D hydroxy results: 19s ng/ml  · Patient lives above latitude 35 degree which can predispose decrease in vitamin-D production from sunlight exposure especially during winter. Susceptible population including elderly(a 68-year-old has a 70% reduction in vitamin D production compared with a young adult exposed to the  same amount of sunlight), Women's, Children, and institutionalized patients are susceptible for decrease in vitamin-D production. · Currently not on treatment     Plan:  · Start  ergocalviferol 50 k u x 8 weeks, reassess thereafter  · Will repeat vitamin-D screening in 2 months   · Concerns addressed. · Discussed implications of low vitamin D. Advised patient for increased sun exposure (specially on the arms and legs while using sun protection on the face) for at least 5-30 minutes (depending on the degree of skin pigmentation, time of the day, season, latitude, and age). · Encouraged the patient Arzella Seamen exposure preferably between 10:00 a.m. to 3:00 p.m. twice a week to stimulate cutaneous vitamin-D production. · Encouraged the patient to use a timer to prevent sunburn. · If the above recommendations are not possible, was advised to use a tanning bed for 30 to 50% of the recommended time for sun tanning as an alternative in addition to the vitamin-D supplement   · We also going to start the patient on 1-2 g per day orally of calcium supplement.

## 2023-09-18 NOTE — ASSESSMENT & PLAN NOTE
Assessment   Pateint hemodynamically stable, asymptomatic with no active bleeding. Per last CBC on 2/2023, microcytic anemia. Hemoglobin @ ^11g/dl   On ferrous sulfate 324 mg qd, compliant, denies side effects.      Plan   Continue Current treatment. Continue to monitor for symptoms. Call the office/ED precautions if active bleeding. Will ordr iron panel for further evaluation.    Treat as necessary

## 2023-09-18 NOTE — PROGRESS NOTES
Name: Ericka Nguyễn      : 1990      MRN: [id number]  Encounter Provider: Gagan Strange MD  Encounter Date: 2023   Encounter department: [address] [facility],[address]     1. Iron deficiency anemia, unspecified iron deficiency anemia type  Assessment & Plan:  Assessment   Pateint hemodynamically stable, asymptomatic with no active bleeding. Per last CBC on 2023, microcytic anemia. Hemoglobin @ ^11g/dl   On ferrous sulfate 324 mg qd, compliant, denies side effects.      Plan   Continue Current treatment. Continue to monitor for symptoms. Call the office/ED precautions if active bleeding. Will ordr iron panel for further evaluation. Treat as necessary       2. Hypertension, unspecified type  Assessment & Plan:  Assessment:   • Blood pressure at today’s office visit 122/80 mmHg, controlled  • Blood Pressure goal as per JNC-8 less than 130/90 mmHg,   • Currently on losartan 25 mg qd, compliant  • Patient report no planing to become pregnant. • Offered alternative treatment due to her fertile age, but refuses stating that is doing well with losartan. • Benefits and risk discussed.       Plan:   • Continue current treatment   • BP goals and possible side effects reviewed with patient, recommended to call the office/schedule appointment  if need prior to his next visit if needed   • The patient was educated on the importance of medication compliance and to monitor her blood pressure at home on a  daily basis. • Recommended to  bring BP diary in visit. • Will recommend performing aerobic exercise for at least more than 3 times per week or more that 150 min x week of moderate physical activity. • Will recommend sodium and fat reduction and to increase fruit consumption. • Reassess BP in 3 months.        3. Vitamin D deficiency  Assessment & Plan:  Assessment  · Vitamin-D deficiency less than 20 ng/mL, vitamin-D insufficiency 21- ng/mL  · Vitamin-D hydroxy results: 19s ng/ml  · Patient lives above [place] 35 degree which can predispose decrease in vitamin-D production from sunlight exposure especially during winter. Susceptible population including elderly(a 70-year-old has a 70% reduction in vitamin D production compared with a young adult exposed to the  same amount of sunlight), Women's, Children, and institutionalized patients are susceptible for decrease in vitamin-D production. · Currently not on treatment     Plan:  · Start  ergocalviferol 50 k u x 8 weeks, reassess thereafter  · Will repeat vitamin-D screening in 2 months   · Concerns addressed. · Discussed implications of low vitamin D. Advised patient for increased sun exposure (specially on the arms and legs while using sun protection on the face) for at least 5-30 minutes (depending on the degree of skin pigmentation, time of the day, season, latitude, and age). · Encouraged the patient Cherylynn Congress exposure preferably between 10:00 a.m. to 3:00 p.m. twice a week to stimulate cutaneous vitamin-D production. · Encouraged the patient to use a timer to prevent sunburn. · If the above recommendations are not possible, was advised to use a tanning bed for 30 to 50% of the recommended time for sun tanning as an alternative in addition to the vitamin-D supplement   · We also going to start the patient on 1-2 g per day orally of calcium supplement. Orders:  -     ergocalciferol (VITAMIN D2) 50,000 units; Take 1 capsule (50,000 Units total) by mouth once a week  -     Vitamin D 25 hydroxy; Future; Expected date: 12/18/2023    4. Hypertension  Assessment & Plan:  Assessment:   • Blood pressure at today’s office visit 122/80 mmHg, controlled  • Blood Pressure goal as per JNC-8 less than 130/90 mmHg,   • Currently on losartan 25 mg qd, compliant  • Patient report no planing to become pregnant.    • Offered alternative treatment due to her fertile age, but refuses stating that is doing well with losartan. • Benefits and risk discussed.       Plan:   • Continue current treatment   • BP goals and possible side effects reviewed with patient, recommended to call the office/schedule appointment  if need prior to his next visit if needed   • The patient was educated on the importance of medication compliance and to monitor her blood pressure at home on a  daily basis. • Recommended to  bring BP diary in visit. • Will recommend performing aerobic exercise for at least more than 3 times per week or more that 150 min x week of moderate physical activity. • Will recommend sodium and fat reduction and to increase fruit consumption. • Reassess BP in 3 months. Orders:  -     losartan (COZAAR) 25 mg tablet; Take 1 tablet (25 mg total) by mouth daily    5. Rash on lips  Assessment & Plan:  Assessment   Herpetic like skin rash on her left portion of the upper lip. Burning, tingling sensation. Previous episodes with similar episodes. Plan   Valacyclovir 2000 mg bid x 1 day. Reassess as needed.    -Educated on the end goals of treatment including:   -Reducing symptom severity and duration   -Prevention of immune sequelae   -Prevention of spreading disease     -Educate on Hygiene, including hand hygiene which is a key measure for preventing spread to others.   -Recommended good hydration during treatment in order do avoid detrimental effects in her kidneys. -ED precaution given specially for new onset of neurologic symptoms including headache, rigidity, photophobia etc.                Subjective      It was a pleasure to see Homa Bravo Millington is a 28 y.o.  female with PMH significant for obesity, iron deficiency anemia, depression, previous ED presentation with stroke like symptoms, resolved since, CKD and HTN, here for HTN follow up.    Denies unintentional weight loss, fever, chills, fatigue, weakness, headaches, dizziness, rashes, blurred vision, nausea, palpitation, chest pain, SOB, abdominal pain, urinary changes, bowel changes, legs swelling/pain, sleep problems,  sick contacts or recent travel. Patient's medical conditions are stable unless noted otherwise above.  Patient has not had any recent hospitalizations, or medical emergencies since last visit. Patient reports compliance with her medications. Overall patient reports feeling well. Patient has no further complaints other than what is mentioned in the ROS. Review of Systems   Constitutional: Negative for activity change, appetite change, chills, fatigue and fever. HENT: Negative for congestion, postnasal drip, rhinorrhea, sneezing and sore throat. Eyes: Negative for visual disturbance. Respiratory: Negative for cough, chest tightness, shortness of breath and wheezing. Cardiovascular: Negative for chest pain, palpitations and leg swelling. Gastrointestinal: Negative for abdominal distention, abdominal pain, blood in stool, constipation, diarrhea, nausea and vomiting. Genitourinary: Negative for difficulty urinating, dysuria, hematuria, menstrual problem, vaginal bleeding and vaginal discharge. Musculoskeletal: Negative for arthralgias, myalgias and neck pain. Skin: Negative for rash. Neurological: Negative for dizziness, syncope, weakness, light-headedness, numbness and headaches. Psychiatric/Behavioral: Negative for agitation, behavioral problems and suicidal ideas.        Current Outpatient Medications on File Prior to Visit   Medication Sig   • aspirin 81 mg chewable tablet Chew 1 tablet (81 mg total) daily   • atorvastatin (LIPITOR) 20 mg tablet Take 1 tablet (20 mg total) by mouth every evening   • Blood Pressure KIT Use every other day   • ferrous sulfate 324 (65 Fe) mg Take 1 tablet (324 mg total) by mouth daily before breakfast   • [DISCONTINUED] losartan (COZAAR) 25 mg tablet Take 1 tablet (25 mg total) by mouth daily       Objective     /80 (BP Location: Left arm, Patient Position: Sitting, Cuff Size: Large)   Pulse 85   Temp (!) 97.2 °F (36.2 °C) (Temporal)   Resp 16   Ht 5' 7" (1.702 m)   Wt 101 kg (223 lb)   LMP 09/13/2023 (Exact Date)   SpO2 99%   BMI 34.93 kg/m²     Physical Exam  Vitals and nursing note reviewed. Constitutional:       General: She is awake. She is not in acute distress. Appearance: Normal appearance. She is well-developed and well-groomed. She is obese. She is not ill-appearing, toxic-appearing or diaphoretic. HENT:      Head: Normocephalic and atraumatic. Nose: Nose normal.      Mouth/Throat:      Lips: Pink. Lesions present. Mouth: Mucous membranes are moist.      Tongue: No lesions. Pharynx: Oropharynx is clear. No oropharyngeal exudate or posterior oropharyngeal erythema. Eyes:      Extraocular Movements: Extraocular movements intact. Conjunctiva/sclera: Conjunctivae normal.      Pupils: Pupils are equal, round, and reactive to light. Cardiovascular:      Rate and Rhythm: Normal rate and regular rhythm. No extrasystoles are present. Pulses: Normal pulses. Radial pulses are 2+ on the right side and 2+ on the left side. Heart sounds: Normal heart sounds, S1 normal and S2 normal.   Pulmonary:      Effort: Pulmonary effort is normal.      Breath sounds: Normal breath sounds and air entry. Abdominal:      General: Abdomen is protuberant. Bowel sounds are normal.      Palpations: Abdomen is soft. There is no mass. Tenderness: There is no abdominal tenderness. There is no right CVA tenderness, left CVA tenderness or guarding. Musculoskeletal:         General: Normal range of motion. Cervical back: Normal range of motion and neck supple. Right lower leg: No edema. Left lower leg: No edema. Skin:     General: Skin is warm. Capillary Refill: Capillary refill takes less than 2 seconds. Coloration: Skin is not jaundiced or pale. Findings: No bruising, erythema, lesion or rash. Neurological:      General: No focal deficit present. Mental Status: She is alert. Psychiatric:         Behavior: Behavior is cooperative.        Jose Ham MD

## 2023-09-18 NOTE — ASSESSMENT & PLAN NOTE
Assessment   Herpetic like skin rash on her left portion of the upper lip. Burning, tingling sensation. Previous episodes with similar episodes. Plan   Valacyclovir 2000 mg bid x 1 day. Reassess as needed.    -Educated on the end goals of treatment including:   -Reducing symptom severity and duration   -Prevention of immune sequelae   -Prevention of spreading disease     -Educate on Hygiene, including hand hygiene which is a key measure for preventing spread to others.   -Recommended good hydration during treatment in order do avoid detrimental effects in her kidneys.    -ED precaution given specially for new onset of neurologic symptoms including headache, rigidity, photophobia etc.

## 2023-11-14 ENCOUNTER — TELEPHONE (OUTPATIENT)
Dept: FAMILY MEDICINE CLINIC | Facility: CLINIC | Age: 33
End: 2023-11-14

## 2023-11-30 ENCOUNTER — OFFICE VISIT (OUTPATIENT)
Dept: FAMILY MEDICINE CLINIC | Facility: CLINIC | Age: 33
End: 2023-11-30

## 2023-11-30 VITALS
OXYGEN SATURATION: 99 % | WEIGHT: 234 LBS | HEART RATE: 97 BPM | TEMPERATURE: 97.7 F | SYSTOLIC BLOOD PRESSURE: 128 MMHG | HEIGHT: 67 IN | DIASTOLIC BLOOD PRESSURE: 88 MMHG | BODY MASS INDEX: 36.73 KG/M2 | RESPIRATION RATE: 20 BRPM

## 2023-11-30 DIAGNOSIS — S06.0X9D CONCUSSION WITH LOSS OF CONSCIOUSNESS, SUBSEQUENT ENCOUNTER: ICD-10-CM

## 2023-11-30 DIAGNOSIS — E23.6 EMPTY SELLA SYNDROME (HCC): ICD-10-CM

## 2023-11-30 DIAGNOSIS — I10 HYPERTENSION: Primary | ICD-10-CM

## 2023-11-30 DIAGNOSIS — D50.9 IRON DEFICIENCY ANEMIA, UNSPECIFIED IRON DEFICIENCY ANEMIA TYPE: ICD-10-CM

## 2023-11-30 PROBLEM — K13.0 RASH ON LIPS: Status: RESOLVED | Noted: 2023-09-18 | Resolved: 2023-11-30

## 2023-11-30 PROBLEM — N18.2 CKD (CHRONIC KIDNEY DISEASE) STAGE 2, GFR 60-89 ML/MIN: Status: RESOLVED | Noted: 2023-02-20 | Resolved: 2023-11-30

## 2023-11-30 PROCEDURE — 99213 OFFICE O/P EST LOW 20 MIN: CPT | Performed by: FAMILY MEDICINE

## 2023-11-30 RX ORDER — FERROUS SULFATE 324(65)MG
324 TABLET, DELAYED RELEASE (ENTERIC COATED) ORAL
Qty: 30 TABLET | Refills: 4 | Status: SHIPPED | OUTPATIENT
Start: 2023-11-30

## 2023-11-30 NOTE — PROGRESS NOTES
Name: Homa Black      : 1990      MRN: [id number]  Encounter Provider: Carlos Coughlin MD  Encounter Date: 2023   Encounter department: [facility]    Assessment & Plan     1. Hypertension  Assessment & Plan:  Assessment:   Blood pressure at today’s office visit 128/88 mmHg, controlled  Blood Pressure goal as per JNC-8 less than 140/90 mmHg,   Currently on losartan 25 mg qd, compliant  Patient report no planing to become pregnant.   Offered alternative treatment due to her fertile age, but refuses stating that is doing well with losartan.   Benefits and risk discussed.       Plan:   Continue current treatment   BP goals and possible side effects reviewed with patient.  The patient was educated on the importance of medication compliance and to monitor her blood pressure at home on a  daily basis.   Recommended to  bring BP diary in visit.   Reccommended life style modifications.   Will recommend sodium and fat reduction and to increase fruit consumption.    Reassess BP in 3 months or earlier as needed.     Orders:  -     losartan (COZAAR) 25 mg tablet; Take 1 tablet (25 mg total) by mouth daily    2. Iron deficiency anemia, unspecified iron deficiency anemia type  -     ferrous sulfate 324 (65 Fe) mg; Take 1 tablet (324 mg total) by mouth daily before breakfast    3. Concussion with loss of consciousness, subsequent encounter  Assessment & Plan:  Assessment   Patient with previous hx of work related concussion back in 2022.   Currently stable from neurological stand point  Reports regular follow ups with neurology at [facility]  S/P PT/OT      PLAN  - Continue regular follow ups with neurology.   - Return as needed for further evaluation   - ED precautions given for new onset of neurological symptoms.       4. Empty sella syndrome (HCC)  Assessment & Plan:  Assessment   Currently stable from clinical standpoint.   Unlikely related to acute stroke since  symptoms have been present for more than a year; Headaches and associated HTN as early as 6/2021 per EMR review.   With chronic hx of tinnitus, headaches, dizziness, nausea and visual disturbance, previous complaining on facial paresthesia.  No hx of acute hemorrhage, reports regular menses.   Presented with to -ED with stroke-like symptoms on 6/2023 resulting in hospitalization.   Further evaluation of her MRI 6/30/2023, T1-sagittal image 13/25 shows what appears to be a grade II-III with likely 30-50% CSF occupying brain parenchymal's territory.  Reason why I concur with previous MRI-report from 12/2022 w/ partially empty sella and slightly patulous appearance of the optic nerve sheaths raising the possibility of idiopathic intracranial hypertension. These findings correlates clinically with patient's symptoms.  In addition sydrome is 5 folds more frequent in mid-age females.   Currently following with neurology and ophthalmology with recent visits.   Recent blood work including TSH are wnl.     Plan   Ambulatory referral to Neurosurgery for further investigation, including likely Arnold-Chiari malformation Type 1 and r/o intracranial hypertension; feed back appreciated.   Referral team informed.   Clerical team messaged, recommended to schedule appointment on Thursday 12/21 afternoon with PCP/Dr. Vitale for reassessment/continuity.  Recommended to continue amb follow-ups with her neurologist and ophthalmology at [facility].  Patient aware and agreeable with A/P    ED precautions for new neurological symptoms.     Orders:  -     Ambulatory Referral to Neurosurgery; Future         Patient transitioned to Dr. Goodwin, patient agreeable with transition.   Subjective          It was a pleasure to see Homa Black is a 32 y.o.  female with PMH significant for obesity, HTN, iron deficiency anemia, depression, dizziness after work related concussion accident back in 2022 , who present for HTN follow  up.   -Patient with previous hx of stroke like symptoms, evealuated at -ED after presenting with right sided facial paresthesia and neck tenderness, evaluation was unremarkable, including MRI.   -Suspected of muscle strain, cervical strain, spasm, and treated with muscle relaxers, plus NSADs with symptoms improvement, discharged home with pcp follow up.   -Hx Concussion: following with neurology and ophthalmology. During recent ophthalmologic evaluation a patient of visual system reveal with insufficient convergence as well as saccadic dysfunction was also revealed.   Denies unintentional weight loss, fever, chills, fatigue, weakness, rashes, palpitation, chest pain, SOB, abdominal pain, urinary changes, bowel changes, legs swelling/pain, sleep problems,  sick contacts or recent travel.   Patient's medical conditions are stable unless noted otherwise above.  Patient has not had any recent hospitalizations, or medical emergencies since last visit.  Overall patient reports feeling well.  Patient has no further complaints other than what is mentioned in the ROS. Denies tobacco, alcohol and illicit drug consumption.        Hypertension  Associated symptoms include headaches (chronic hx). Pertinent negatives include no chest pain, palpitations or shortness of breath.   Dizziness  Associated symptoms include headaches (chronic hx) and nausea (chronic hx). Pertinent negatives include no abdominal pain, arthralgias, chest pain, chills, coughing, fever, rash, sore throat or vomiting.     Review of Systems   Constitutional:  Negative for chills and fever.   HENT:  Negative for ear pain and sore throat.    Eyes:  Positive for visual disturbance (chronic hx). Negative for pain.   Respiratory:  Negative for cough and shortness of breath.    Cardiovascular:  Negative for chest pain and palpitations.   Gastrointestinal:  Positive for nausea (chronic hx). Negative for abdominal pain, blood in stool and vomiting.   Genitourinary:   "Negative for dysuria and hematuria.   Musculoskeletal:  Negative for arthralgias and back pain.   Skin:  Negative for color change and rash.   Neurological:  Positive for dizziness (chronic hx) and headaches (chronic hx). Negative for seizures and syncope.   Psychiatric/Behavioral:  Negative for self-injury, sleep disturbance and suicidal ideas.    All other systems reviewed and are negative.      Current Outpatient Medications on File Prior to Visit   Medication Sig    aspirin 81 mg chewable tablet Chew 1 tablet (81 mg total) daily    atorvastatin (LIPITOR) 20 mg tablet Take 1 tablet (20 mg total) by mouth every evening    Blood Pressure KIT Use every other day    ergocalciferol (VITAMIN D2) 50,000 units Take 1 capsule (50,000 Units total) by mouth once a week       Objective     /88 (BP Location: Left arm, Patient Position: Sitting, Cuff Size: Standard)   Pulse 97   Temp 97.7 °F (36.5 °C) (Temporal)   Resp 20   Ht 5' 7\" (1.702 m)   Wt 106 kg (234 lb)   SpO2 99%   BMI 36.65 kg/m²     Physical Exam  Vitals and nursing note reviewed.   Constitutional:       General: She is awake. She is not in acute distress.     Appearance: Normal appearance. She is well-developed and well-groomed. She is obese. She is not ill-appearing, toxic-appearing or diaphoretic.   HENT:      Head: Normocephalic and atraumatic.      Nose: Nose normal.      Mouth/Throat:      Mouth: Mucous membranes are moist.      Pharynx: Oropharynx is clear. No oropharyngeal exudate or posterior oropharyngeal erythema.   Eyes:      Extraocular Movements: Extraocular movements intact.      Conjunctiva/sclera: Conjunctivae normal.      Pupils: Pupils are equal, round, and reactive to light.   Cardiovascular:      Rate and Rhythm: Normal rate and regular rhythm. No extrasystoles are present.     Pulses: Normal pulses.           Radial pulses are 2+ on the right side and 2+ on the left side.      Heart sounds: Normal heart sounds, S1 normal and S2 " normal.   Pulmonary:      Effort: Pulmonary effort is normal.      Breath sounds: Normal breath sounds and air entry.   Abdominal:      General: Bowel sounds are normal.      Palpations: Abdomen is soft. There is no mass.      Tenderness: There is no abdominal tenderness. There is no right CVA tenderness, left CVA tenderness or guarding.   Musculoskeletal:         General: Normal range of motion.      Cervical back: Normal range of motion.      Right lower leg: No edema.      Left lower leg: No edema.   Skin:     General: Skin is warm.      Capillary Refill: Capillary refill takes less than 2 seconds.      Coloration: Skin is not jaundiced or pale.      Findings: No bruising, erythema, lesion or rash.   Neurological:      General: No focal deficit present.      Mental Status: She is alert and oriented to person, place, and time. Mental status is at baseline.      GCS: GCS eye subscore is 4. GCS motor subscore is 6.      Cranial Nerves: Cranial nerves 2-12 are intact.      Sensory: Sensation is intact.      Motor: Motor function is intact.      Coordination: Coordination is intact.      Gait: Gait is intact.      Deep Tendon Reflexes:      Reflex Scores:       Patellar reflexes are 2+ on the right side and 2+ on the left side.  Psychiatric:         Attention and Perception: Attention and perception normal.         Mood and Affect: Mood and affect normal.         Behavior: Behavior is cooperative.         Thought Content: Thought content normal.       Carlos Coughlin MD

## 2023-12-13 RX ORDER — LOSARTAN POTASSIUM 25 MG/1
25 TABLET ORAL DAILY
Qty: 30 TABLET | Refills: 4 | Status: SHIPPED | OUTPATIENT
Start: 2023-12-13

## 2023-12-18 PROBLEM — S06.0X9A CONCUSSION WITH LOSS OF CONSCIOUSNESS: Status: ACTIVE | Noted: 2023-06-29

## 2023-12-18 PROBLEM — E23.6 EMPTY SELLA SYNDROME (HCC): Status: ACTIVE | Noted: 2023-12-18

## 2023-12-18 NOTE — ASSESSMENT & PLAN NOTE
Assessment   Currently stable from clinical standpoint.   Unlikely related to acute stroke since symptoms have been present for more than a year; Headaches and associated HTN as early as 6/2021 per EMR review.   With chronic hx of tinnitus, headaches, dizziness, nausea and visual disturbance, previous complaining on facial paresthesia.  No hx of acute hemorrhage, reports regular menses.   Presented with to -ED with stroke-like symptoms on 6/2023 resulting in hospitalization.   Further evaluation of her MRI 6/30/2023, T1-sagittal image 13/25 shows what appears to be a grade II-III with likely 30-50% CSF occupying brain parenchymal's territory.  Reason why I concur with previous MRI-report from 12/2022 w/ partially empty sella and slightly patulous appearance of the optic nerve sheaths raising the possibility of idiopathic intracranial hypertension. These findings correlates clinically with patient's symptoms.  In addition sydrome is 5 folds more frequent in mid-age females.   Currently following with neurology and ophthalmology with recent visits.   Recent blood work including TSH are wnl.     Plan   Ambulatory referral to Neurosurgery for further investigation, including likely Arnold-Chiari malformation Type 1 and r/o intracranial hypertension; feed back appreciated.   Referral team informed.   Clerical team messaged, recommended to schedule appointment on Thursday 12/21 afternoon with PCP/Dr. Vitale for reassessment/continuity.  Recommended to continue amb follow-ups with her neurologist and ophthalmology at [facility].  Patient aware and agreeable with A/P    ED precautions for new neurological symptoms.

## 2023-12-18 NOTE — ASSESSMENT & PLAN NOTE
Assessment   Patient with previous hx of work related concussion back in sept 2022.   Currently stable from neurological stand point  Reports regular follow ups with neurology at [facility]  S/P PT/OT      PLAN  - Continue regular follow ups with neurology.   - Return as needed for further evaluation   - ED precautions given for new onset of neurological symptoms.

## 2023-12-18 NOTE — ASSESSMENT & PLAN NOTE
Assessment:   Blood pressure at today’s office visit 128/88 mmHg, controlled  Blood Pressure goal as per JNC-8 less than 140/90 mmHg,   Currently on losartan 25 mg qd, compliant  Patient report no planing to become pregnant.   Offered alternative treatment due to her fertile age, but refuses stating that is doing well with losartan.   Benefits and risk discussed.       Plan:   Continue current treatment   BP goals and possible side effects reviewed with patient.  The patient was educated on the importance of medication compliance and to monitor her blood pressure at home on a  daily basis.   Recommended to  bring BP diary in visit.   Reccommended life style modifications.   Will recommend sodium and fat reduction and to increase fruit consumption.    Reassess BP in 3 months or earlier as needed.

## 2023-12-19 ENCOUNTER — TELEPHONE (OUTPATIENT)
Dept: FAMILY MEDICINE CLINIC | Facility: CLINIC | Age: 33
End: 2023-12-19

## 2023-12-19 NOTE — TELEPHONE ENCOUNTER
----- Message from Carlos Coughlin MD sent at 12/18/2023 10:22 PM EST -----  Regarding: schedule appoitment  Greetings, please schedule appointment Thursday 12/21 afternoon with Dr. Perla/Dr Vitale at 2:20 pm for 40 min visit-follow up on empty sella syndrome. Thank you and nice day.

## 2023-12-21 ENCOUNTER — OFFICE VISIT (OUTPATIENT)
Dept: FAMILY MEDICINE CLINIC | Facility: CLINIC | Age: 33
End: 2023-12-21

## 2023-12-21 VITALS
DIASTOLIC BLOOD PRESSURE: 86 MMHG | HEIGHT: 67 IN | HEART RATE: 87 BPM | OXYGEN SATURATION: 99 % | WEIGHT: 227 LBS | TEMPERATURE: 98.2 F | SYSTOLIC BLOOD PRESSURE: 124 MMHG | BODY MASS INDEX: 35.63 KG/M2 | RESPIRATION RATE: 20 BRPM

## 2023-12-21 DIAGNOSIS — E23.6 EMPTY SELLA SYNDROME (HCC): Primary | ICD-10-CM

## 2023-12-21 PROBLEM — R42 DIZZINESS: Status: RESOLVED | Noted: 2023-12-21 | Resolved: 2023-12-21

## 2023-12-21 PROBLEM — R42 DIZZINESS: Status: ACTIVE | Noted: 2023-12-21

## 2023-12-21 PROCEDURE — 3074F SYST BP LT 130 MM HG: CPT | Performed by: FAMILY MEDICINE

## 2023-12-21 PROCEDURE — 3079F DIAST BP 80-89 MM HG: CPT | Performed by: FAMILY MEDICINE

## 2023-12-21 PROCEDURE — 99213 OFFICE O/P EST LOW 20 MIN: CPT | Performed by: FAMILY MEDICINE

## 2023-12-21 NOTE — PROGRESS NOTES
Name: Homa Black      : 1990      MRN: [id number]  Encounter Provider: Ching Perla MD  Encounter Date: 2023   Encounter department: [facility]    Assessment & Plan     1. Empty sella syndrome (HCC)  Assessment & Plan:  Questionable based on MRI report from 23, however it was noted on MRI-report from 2022 w/ partially empty sella and slightly patulous appearance of the optic nerve sheaths raising the possibility of idiopathic intracranial hypertension.   History of tinnitus, headaches, dizziness, nausea and visual disturbance, previous complaining on facial paresthesia..  Currently following with neurology and ophthalmology with recent visits  Recent blood work including TSH are wnl  Neurological exam today significant for photophobia on her right eye; dizziness and tinnitus triggered by falguni-Hallpike maneuver. No nystagmus appreciated today. Normal visual fields, normal strength and coordination. No gait abnormality.  Neurosurgery appointment scheduled to 23     Plan   Advised patient to go to Neurosurgery appointment tomorrow   ED precautions for new neurological symptoms.  Continue to follow up with ophthalmology, Neurology and Concussion and Head Trauma              Subjective      Homa is a 33 y.o female with PMH of hypertension, depression, obesity, iron deficiency, concussion, chronic tinnitus, dizziness and visual disturbances who presents today to discuss evaluation of possible empty sella syndrome.  Patient reports that she feels dizzy (2-3 times a day) usually when she gets up fast or with rapid movements. Patient also states that she has pulsatil headaches (usually on the right side) associated with photophobia. Headache resolves with Sumatripan (she has headache 3-4 times a week). She also reports frequent nauseas. Patient reports to follow with neurology for migraines.  All those symptoms have been present since  "concussion on 9/2022.       Review of Systems   Constitutional:  Negative for chills and fever.   HENT:  Positive for tinnitus. Negative for ear pain and sore throat.    Eyes:  Negative for pain and visual disturbance.   Respiratory:  Negative for cough and shortness of breath.    Cardiovascular:  Negative for chest pain and palpitations.   Gastrointestinal:  Negative for abdominal pain and vomiting.   Genitourinary:  Negative for dysuria and hematuria.   Musculoskeletal:  Negative for arthralgias and back pain.   Skin:  Negative for color change and rash.   Neurological:  Positive for dizziness. Negative for seizures and syncope.   All other systems reviewed and are negative.      Current Outpatient Medications on File Prior to Visit   Medication Sig    aspirin 81 mg chewable tablet Chew 1 tablet (81 mg total) daily    atorvastatin (LIPITOR) 20 mg tablet Take 1 tablet (20 mg total) by mouth every evening    Blood Pressure KIT Use every other day    ergocalciferol (VITAMIN D2) 50,000 units Take 1 capsule (50,000 Units total) by mouth once a week    ferrous sulfate 324 (65 Fe) mg Take 1 tablet (324 mg total) by mouth daily before breakfast    losartan (COZAAR) 25 mg tablet Take 1 tablet (25 mg total) by mouth daily       Objective     /86 (BP Location: Right arm, Patient Position: Sitting, Cuff Size: Standard)   Pulse 87   Temp 98.2 °F (36.8 °C) (Temporal)   Resp 20   Ht 5' 7\" (1.702 m)   Wt 103 kg (227 lb)   SpO2 99%   BMI 35.55 kg/m²     Physical Exam  Constitutional:       General: She is not in acute distress.     Appearance: Normal appearance. She is obese. She is not toxic-appearing.   HENT:      Head: Normocephalic and atraumatic.      Right Ear: Tympanic membrane and external ear normal.      Left Ear: Tympanic membrane and external ear normal.      Nose: Nose normal. No congestion or rhinorrhea.   Eyes:      General: No visual field deficit or scleral icterus.     Conjunctiva/sclera: " Conjunctivae normal.   Cardiovascular:      Rate and Rhythm: Normal rate and regular rhythm.      Pulses: Normal pulses.      Heart sounds: Normal heart sounds. No murmur heard.     No gallop.   Pulmonary:      Effort: Pulmonary effort is normal. No respiratory distress.      Breath sounds: Normal breath sounds. No wheezing or rales.   Abdominal:      General: Abdomen is flat. There is no distension.      Palpations: Abdomen is soft.      Tenderness: There is no abdominal tenderness. There is no guarding.   Musculoskeletal:      Cervical back: Normal range of motion and neck supple.      Right lower leg: No edema.      Left lower leg: No edema.   Skin:     General: Skin is warm and dry.      Capillary Refill: Capillary refill takes less than 2 seconds.      Coloration: Skin is not jaundiced or pale.   Neurological:      General: No focal deficit present.      Mental Status: She is alert and oriented to person, place, and time.      Cranial Nerves: No cranial nerve deficit, dysarthria or facial asymmetry.      Motor: No weakness, atrophy or pronator drift.      Coordination: Romberg sign negative. Coordination normal. Finger-Nose-Finger Test and Heel to Shin Test normal.      Gait: Gait is intact.   Psychiatric:         Mood and Affect: Mood normal.         Behavior: Behavior normal.       Ching Perla MD

## 2023-12-21 NOTE — ASSESSMENT & PLAN NOTE
Questionable based on MRI report from 6/30/23, however it was noted on MRI-report from 12/2022 w/ partially empty sella and slightly patulous appearance of the optic nerve sheaths raising the possibility of idiopathic intracranial hypertension.   History of tinnitus, headaches, dizziness, nausea and visual disturbance, previous complaining on facial paresthesia..  Currently following with neurology and ophthalmology with recent visits  Recent blood work including TSH are wnl  Neurological exam today significant for photophobia on her right eye; dizziness and tinnitus triggered by falguni-Hallpike maneuver. No nystagmus appreciated today. Normal visual fields, normal strength and coordination. No gait abnormality.  Neurosurgery appointment scheduled to 12/22/23     Plan   Advised patient to go to Neurosurgery appointment tomorrow   ED precautions for new neurological symptoms.  Continue to follow up with ophthalmology, Neurology and Concussion and Head Trauma

## 2023-12-22 ENCOUNTER — CONSULT (OUTPATIENT)
Dept: NEUROSURGERY | Facility: CLINIC | Age: 33
End: 2023-12-22
Payer: COMMERCIAL

## 2023-12-22 VITALS
HEART RATE: 72 BPM | SYSTOLIC BLOOD PRESSURE: 140 MMHG | DIASTOLIC BLOOD PRESSURE: 92 MMHG | HEIGHT: 67 IN | OXYGEN SATURATION: 99 % | WEIGHT: 227 LBS | RESPIRATION RATE: 17 BRPM | BODY MASS INDEX: 35.63 KG/M2 | TEMPERATURE: 98.6 F

## 2023-12-22 DIAGNOSIS — E23.6 EMPTY SELLA SYNDROME (HCC): ICD-10-CM

## 2023-12-22 PROCEDURE — 99244 OFF/OP CNSLTJ NEW/EST MOD 40: CPT | Performed by: STUDENT IN AN ORGANIZED HEALTH CARE EDUCATION/TRAINING PROGRAM

## 2023-12-22 RX ORDER — ESCITALOPRAM OXALATE 5 MG/1
5 TABLET ORAL DAILY
COMMUNITY

## 2023-12-22 RX ORDER — ONDANSETRON 4 MG/1
4 TABLET, FILM COATED ORAL EVERY 8 HOURS PRN
COMMUNITY

## 2023-12-22 RX ORDER — DIAZEPAM 5 MG/1
TABLET ORAL
Qty: 1 TABLET | Refills: 0 | Status: SHIPPED | OUTPATIENT
Start: 2023-12-22

## 2023-12-22 RX ORDER — SUMATRIPTAN 50 MG/1
50 TABLET, FILM COATED ORAL ONCE AS NEEDED
COMMUNITY

## 2023-12-22 NOTE — PROGRESS NOTES
Assessment/Plan:  33-year-old female with a history of hypertension who presents to clinic for symptoms of headache, nausea, vomiting, blurry vision that have been ongoing for approximately 1 to 1.5 years ever since she suffered an accident at work where a metal object struck her head.  MRI brain without contrast did not show any acute findings but reportedly had a partially empty sella.  To further workup IIH, I ordered an MRI brain with contrast to evaluate for underlying lesions, as well as an MR venogram to look for transverse sinus stenosis.  I gave her referral to ophthalmology to specifically look for any evidence of papilledema.  I also ordered an IR lumbar puncture for opening pressure diagnosis IIH if it is present.  Return to clinic after the above workup is completed to discuss results and further treatment options.    I spent 40 minutes obtaining history and physical exam, reviewing imaging, discussing treatment options, and coordinating care.   Reason for Visit:  Consult       HPI     33-year-old female with a history of hypertension who presents to clinic for evaluation of reported empty sella syndrome and concern for IIH.  She states that approximately 1 to 1.5 years ago she had an incident at work where she was struck in the head by a metal object.  She states that since then she has had daily headaches as well as associated nausea and vomiting and blurry vision.  She also describes tinnitus in the right ear and numbness on the right side of her face.  She underwent an MRI brain without contrast which showed no acute findings but there is some report that she has empty sella syndrome based on the MRI.  She was referred to neurosurgery for concern for IIH.    Review of Systems   Constitutional: Negative.    HENT:  Positive for tinnitus (pulsating in right ear).    Eyes:  Positive for visual disturbance (blurry, pressure in right eye).   Respiratory: Negative.     Cardiovascular: Negative.   "  Gastrointestinal:  Positive for nausea (after incident on 9/27/2022) and vomiting (stress related).   Endocrine: Negative.    Genitourinary: Negative.    Musculoskeletal: Negative.    Skin: Negative.    Allergic/Immunologic: Negative.    Neurological:  Positive for dizziness (2-3/day), speech difficulty (forgetful w/ words & slurring), weakness (right hand/arm), numbness (right palm, right cheek) and headaches.   Hematological:  Bruises/bleeds easily (aspirin).   Psychiatric/Behavioral: Negative.          /92 (BP Location: Right arm, Patient Position: Sitting, Cuff Size: Standard)   Pulse 72   Temp 98.6 °F (37 °C) (Temporal)   Resp 17   Ht 5' 7\" (1.702 m)   Wt 103 kg (227 lb)   SpO2 99%   BMI 35.55 kg/m²        Current Outpatient Medications:     aspirin 81 mg chewable tablet, Chew 1 tablet (81 mg total) daily, Disp: 90 tablet, Rfl: 0    atorvastatin (LIPITOR) 20 mg tablet, Take 1 tablet (20 mg total) by mouth every evening, Disp: 90 tablet, Rfl: 0    Blood Pressure KIT, Use every other day, Disp: 1 kit, Rfl: 0    ergocalciferol (VITAMIN D2) 50,000 units, Take 1 capsule (50,000 Units total) by mouth once a week, Disp: 8 capsule, Rfl: 0    escitalopram (LEXAPRO) 5 mg tablet, Take 5 mg by mouth daily, Disp: , Rfl:     ferrous sulfate 324 (65 Fe) mg, Take 1 tablet (324 mg total) by mouth daily before breakfast, Disp: 30 tablet, Rfl: 4    losartan (COZAAR) 25 mg tablet, Take 1 tablet (25 mg total) by mouth daily, Disp: 30 tablet, Rfl: 4    ondansetron (ZOFRAN) 4 mg tablet, Take 4 mg by mouth every 8 (eight) hours as needed for nausea or vomiting, Disp: , Rfl:     SUMAtriptan (IMITREX) 50 mg tablet, Take 50 mg by mouth once as needed for migraine, Disp: , Rfl:     Past Medical History:   Diagnosis Date    Diabetes mellitus (HCC)     Hypertension       History reviewed. No pertinent surgical history.  Social History     Socioeconomic History    Marital status: /Civil Union     Spouse name: Not on " file    Number of children: Not on file    Years of education: Not on file    Highest education level: Not on file   Occupational History    Not on file   Tobacco Use    Smoking status: Never     Passive exposure: Never    Smokeless tobacco: Never   Vaping Use    Vaping status: Never Used   Substance and Sexual Activity    Alcohol use: Yes     Comment: once/month    Drug use: Never    Sexual activity: Yes     Partners: Male     Birth control/protection: None   Other Topics Concern    Not on file   Social History Narrative    Not on file     Social Determinants of Health     Financial Resource Strain: High Risk (7/6/2023)    Overall Financial Resource Strain (CARDIA)     Difficulty of Paying Living Expenses: Hard   Food Insecurity: No Food Insecurity (7/6/2023)    Hunger Vital Sign     Worried About Running Out of Food in the Last Year: Never true     Ran Out of Food in the Last Year: Never true   Transportation Needs: No Transportation Needs (7/6/2023)    PRAPARE - Transportation     Lack of Transportation (Medical): No     Lack of Transportation (Non-Medical): No   Physical Activity: Not on file   Stress: Not on file   Social Connections: Not on file   Intimate Partner Violence: Not on file   Housing Stability: Low Risk  (3/23/2022)    Housing Stability Vital Sign     Unable to Pay for Housing in the Last Year: No     Number of Places Lived in the Last Year: 1     Unstable Housing in the Last Year: No       The following portions of the patient's history were reviewed and updated as appropriate: allergies, current medications, past family history, past medical history, past social history, past surgical history, and problem list.    Physical Exam       A&OX3  Gaze conjugate  EOMI  FS  TM  Fcx4  5/5 BUE  5/5 BLE  SILT    Imaging and labs:  I reviewed her MRI brain without contrast that showed no acute findings.  Her pituitary gland does appear to be somewhat flattened a bit

## 2024-01-04 ENCOUNTER — HOSPITAL ENCOUNTER (OUTPATIENT)
Dept: RADIOLOGY | Facility: HOSPITAL | Age: 34
Discharge: HOME | End: 2024-01-04
Attending: STUDENT IN AN ORGANIZED HEALTH CARE EDUCATION/TRAINING PROGRAM

## 2024-01-04 DIAGNOSIS — E23.6 EMPTY SELLA SYNDROME (HCC): ICD-10-CM

## 2024-01-07 ENCOUNTER — HOSPITAL ENCOUNTER (OUTPATIENT)
Dept: RADIOLOGY | Facility: HOSPITAL | Age: 34
Discharge: HOME | End: 2024-01-07
Attending: STUDENT IN AN ORGANIZED HEALTH CARE EDUCATION/TRAINING PROGRAM
Payer: COMMERCIAL

## 2024-01-07 DIAGNOSIS — E23.6 EMPTY SELLA SYNDROME (HCC): ICD-10-CM

## 2024-01-07 PROCEDURE — A9585 GADOBUTROL INJECTION: HCPCS | Performed by: STUDENT IN AN ORGANIZED HEALTH CARE EDUCATION/TRAINING PROGRAM

## 2024-01-07 PROCEDURE — G1004 CDSM NDSC: HCPCS

## 2024-01-07 PROCEDURE — 70553 MRI BRAIN STEM W/O & W/DYE: CPT

## 2024-01-07 PROCEDURE — 70544 MR ANGIOGRAPHY HEAD W/O DYE: CPT

## 2024-01-07 RX ORDER — GADOBUTROL 604.72 MG/ML
10 INJECTION INTRAVENOUS
Status: COMPLETED | OUTPATIENT
Start: 2024-01-07 | End: 2024-01-07

## 2024-01-07 RX ADMIN — GADOBUTROL 10 ML: 604.72 INJECTION INTRAVENOUS at 09:28

## 2024-01-11 ENCOUNTER — TELEPHONE (OUTPATIENT)
Dept: RADIOLOGY | Facility: HOSPITAL | Age: 34
End: 2024-01-11

## 2024-01-25 ENCOUNTER — TELEPHONE (OUTPATIENT)
Dept: NEUROSURGERY | Facility: CLINIC | Age: 34
End: 2024-01-25

## 2024-01-25 ENCOUNTER — OFFICE VISIT (OUTPATIENT)
Dept: NEUROSURGERY | Facility: CLINIC | Age: 34
End: 2024-01-25
Payer: COMMERCIAL

## 2024-01-25 VITALS
SYSTOLIC BLOOD PRESSURE: 100 MMHG | OXYGEN SATURATION: 99 % | TEMPERATURE: 98.2 F | BODY MASS INDEX: 42.89 KG/M2 | DIASTOLIC BLOOD PRESSURE: 60 MMHG | HEIGHT: 61 IN | HEART RATE: 105 BPM

## 2024-01-25 DIAGNOSIS — R51.9 CHRONIC HEADACHE: Primary | ICD-10-CM

## 2024-01-25 DIAGNOSIS — G89.29 CHRONIC HEADACHE: Primary | ICD-10-CM

## 2024-01-25 PROCEDURE — 99215 OFFICE O/P EST HI 40 MIN: CPT | Performed by: STUDENT IN AN ORGANIZED HEALTH CARE EDUCATION/TRAINING PROGRAM

## 2024-01-25 NOTE — TELEPHONE ENCOUNTER
1/25/24 Patient will call Ophthalmology for an appointment she was advised at check out once she has her ophthalmology appointment scheduled she should call neurosurgery back to schedule a follow up with Dr. Rodriguez. She also will call Neurology to schedule.

## 2024-01-25 NOTE — PROGRESS NOTES
Assessment/Plan:  33-year-old female with a history of hypertension who presents to clinic for evaluation of reported empty sella syndrome and concern for IIH.  Her symptoms stem from a traumatic brain injury she sustained approximately 1.5 years ago after being struck in the head by a metal object and reportedly underwent imaging at that time and was told she had a contusion on her brain.  MRI brain and MR venogram brain performed recently were both normal.  I stated that the neck step will be IR lumbar puncture for opening pressure as well as ophthalmology eye exam to rule out papilledema.  She stated that she is not interested to pursue the lumbar puncture at this time because she is uncomfortable with it but she will proceed with the ophthalmology eye exam.  Return to clinic after ophthalmology eval for papilledema.  She is going to continue thinking about the IR lumbar puncture.  I stated that the lumbar puncture definitively would rule in or rule out intracranial hypertension as it will directly measure the pressure in her head and without the lumbar puncture I will be unable to definitively rule out IIH.  I placed a referral to neurology for chronic headaches as well.    I spent 45 minutes obtaining history and physical exam, reviewing imaging, discussing treatment options, and coordinating care.        Subjective:      Patient ID: Homa Black is a 33 y.o. female.    HPI    33-year-old female with a history of hypertension who presents to clinic for evaluation of reported empty sella syndrome and concern for IIH.  Approximately 1 to 1.5 years ago she had an incident where she was struck in the head by a metal object and sustained a traumatic brain injury.  She states that she was reportedly told she had a contusion in her brain from the event.  Since the event she has had essentially daily headaches as well as associated nausea and occasional vomiting and blurry vision.  She also describes  "tinnitus in her right ear and numbness on the right side of her face on occasion.  She underwent MRI brain in 2022 which was reportedly read as partially empty sella otherwise normal.  She was referred to my clinic for evaluation of IIH given the partially empty sella read on the MRI brain.  Since then she has had 2 more MRI brain's with and without contrast that did not reveal any abnormalities, no lesions, and a normal sella.  I ordered an MR venogram as well to rule out transverse sinus stenosis and it was negative for this.  I discussed that the neck step will be an IR lumbar puncture to rule out elevated ICP and also an ophthalmology evaluation to rule out papilledema.  She stated that she does not want to undergo a lumbar puncture because she is scared of it.    The following portions of the patient's history were reviewed and updated as appropriate: allergies, current medications, past family history, past medical history, past social history, past surgical history, and problem list.    Review of Systems      Objective:      /60 (BP Location: Left arm, Patient Position: Sitting, Cuff Size: Standard)   Pulse 105   Temp 98.2 °F (36.8 °C) (Temporal)   Ht 5' 1\" (1.549 m)   SpO2 99%   BMI 42.89 kg/m²          Physical Exam    A&OX3  Gaze conjugate  EOMI  FS  TM  Fcx4  5/5 BUE  5/5 BLE  SILT  "

## 2024-02-07 ENCOUNTER — OFFICE VISIT (OUTPATIENT)
Dept: BARIATRICS | Facility: CLINIC | Age: 34
End: 2024-02-07
Payer: COMMERCIAL

## 2024-02-07 VITALS
SYSTOLIC BLOOD PRESSURE: 138 MMHG | HEART RATE: 83 BPM | DIASTOLIC BLOOD PRESSURE: 80 MMHG | BODY MASS INDEX: 36.74 KG/M2 | HEIGHT: 66 IN | WEIGHT: 228.6 LBS

## 2024-02-07 DIAGNOSIS — I10 HYPERTENSION, UNSPECIFIED TYPE: ICD-10-CM

## 2024-02-07 DIAGNOSIS — E55.9 VITAMIN D DEFICIENCY: ICD-10-CM

## 2024-02-07 DIAGNOSIS — E66.9 OBESITY (BMI 30-39.9): Primary | ICD-10-CM

## 2024-02-07 DIAGNOSIS — D50.9 IRON DEFICIENCY ANEMIA, UNSPECIFIED IRON DEFICIENCY ANEMIA TYPE: ICD-10-CM

## 2024-02-07 DIAGNOSIS — E78.5 HLD (HYPERLIPIDEMIA): ICD-10-CM

## 2024-02-07 DIAGNOSIS — R73.03 PREDIABETES: ICD-10-CM

## 2024-02-07 PROCEDURE — 99244 OFF/OP CNSLTJ NEW/EST MOD 40: CPT | Performed by: INTERNAL MEDICINE

## 2024-02-07 NOTE — PATIENT INSTRUCTIONS
General Recommendations:  Nutrition:  Eat breakfast daily.  Do not skip meals.     Food log (ie.) www.[employer].com, sparkpeople.com, loseit.com, calorieking.com, etc.    Practice mindful eating.  Be sure to set aside time to eat, eat slowly, and savor your food.    Hydration:    At least 64oz of water daily.  No sugar sweetened beverages.  No juice (eat the fruit instead).    Exercise:  Studies have shown that the ideal exercise goal is somewhere between 150 to 300 minutes of moderate intensity exercise a week.  Start with exercising 10 minutes every other day and gradually increase physical activity with a goal of at least 150 minutes of moderate intensity exercise a week, divided over at least 3 days a week.  An example of this would be exercising 30 minutes a day, 5 days a week.  Resistance training can increase muscle mass and increase our resting metabolic rate.   FULL BODY resistance training is recommended 2-3 times a week.  Do not do this on consecutive days to allow for muscle recovery.    Aim for a bare minimum 5000 steps, even on days you do not exercise.    Monitoring:   Weigh yourself daily.  If this causes undue stress, then just weigh yourself once a week.  Weigh yourself the same time of the day with the same amount of clothing on.  Preferably this should be done after waking up, before you eat, and with no clothing or minimal clothing on.    Specific Goals:  No sugary beverages. At least 64oz of water daily.    Gradually increase physical activity to a goal of 5 days per week for 30 minutes of MODERATE intensity PLUS 2 days per week of FULL BODY resistance training    Meet with  to work on stress eating.    Calorie goal:  8870-3308 calories a day (Provided with meal plan to follow).    Return visit:  3 months

## 2024-02-07 NOTE — PROGRESS NOTES
[id number][facility]    Assessment/Plan:  Homa was seen today for consult.    Diagnoses and all orders for this visit:    Obesity (BMI 30-39.9)    Prediabetes    Hypertension, unspecified type    HLD (hyperlipidemia)    Iron deficiency anemia, unspecified iron deficiency anemia type    Vitamin D deficiency    - Discussed options of HealthyCORE-Intensive Lifestyle Intervention Program, Very Low Calorie Diet-VLCD, Conservative Program, Carol-En-Y Gastric Bypass, and Vertical Sleeve Gastrectomy and the role of weight loss medications.  Meets medical criteria for surgery but not insurance requirements.  - Explained the importance of making lifestyle changes first before starting anti-obesity medications.  - Patient should demonstrate lifestyle changes first before anti-obesity medication initiated.  Reluctant to start medication for this at this time regardless.    - Patient is interested in pursuing Conservative Program  - Initial weight loss goal of 5-10% weight loss for improved health as studies have shown this is where we see the greatest impact on improving health and decreasing risk of obesity related conditions.  - Weight loss can improve patient's co-morbid conditions and/or prevent weight-related complications.  - Labs reviewed: As below.      General Recommendations:  Nutrition:  Eat breakfast daily.  Do not skip meals.     Food log (ie.) www.Powerphotonic.com, sparkpeople.com, loseit.com, calorieking.com, etc.    Practice mindful eating.  Be sure to set aside time to eat, eat slowly, and savor your food.    Hydration:    At least 64oz of water daily.  No sugar sweetened beverages.  No juice (eat the fruit instead).    Exercise:  Studies have shown that the ideal exercise goal is somewhere between 150 to 300 minutes of moderate intensity exercise a week.  Start with exercising 10 minutes every other day and gradually increase physical activity with a goal of at least 150 minutes of moderate intensity  exercise a week, divided over at least 3 days a week.  An example of this would be exercising 30 minutes a day, 5 days a week.  Resistance training can increase muscle mass and increase our resting metabolic rate.   FULL BODY resistance training is recommended 2-3 times a week.  Do not do this on consecutive days to allow for muscle recovery.    Aim for a bare minimum 5000 steps, even on days you do not exercise.    Monitoring:   Weigh yourself daily.  If this causes undue stress, then just weigh yourself once a week.  Weigh yourself the same time of the day with the same amount of clothing on.  Preferably this should be done after waking up, before you eat, and with no clothing or minimal clothing on.    Specific Goals:  No sugary beverages. At least 64oz of water daily.    Gradually increase physical activity to a goal of 5 days per week for 30 minutes of MODERATE intensity PLUS 2 days per week of FULL BODY resistance training    Meet with  to work on stress eating.    Calorie goal:  0571-5769 calories a day (Provided with meal plan to follow).    Return visit:  3 months    Total time spent reviewing chart, interviewing patient, examining patient, discussing plan, answering all questions, and documentin min.       ______________________________________________________________________        Subjective:   Chief Complaint   Patient presents with    Consult     MWM CONSULT       HPI: Homa Black  is a 33 y.o. female with history of prediabetes, hypertension, hyperlipidemia, vitamin D deficiency, iron deficiency and excess weight, here to pursue weight loss management.  Previous notes and records have been reviewed.    HPI  Wt Readings from Last 20 Encounters:   24 104 kg (228 lb 9.6 oz)   23 103 kg (227 lb)   23 103 kg (227 lb)   23 106 kg (234 lb)   23 101 kg (223 lb)   23 100 kg (221 lb)   23 101 kg (222 lb 11.2 oz)   23 104 kg (228 lb  "9.9 oz)   06/30/23 101 kg (223 lb)   06/05/23 103 kg (227 lb)   05/04/23 104 kg (229 lb)   04/20/23 104 kg (229 lb 12.8 oz)   04/13/23 109 kg (240 lb 11.9 oz)   03/27/23 108 kg (238 lb 9.6 oz)   03/20/23 108 kg (237 lb)   02/20/23 108 kg (238 lb)   03/23/22 114 kg (250 lb 9.6 oz)     Excess Weight:  Onset: Reports stress eating after concussion 9/2022)  Highest weight: 255 (3/2023 - before concussion based on EMR record)  Current weight: 228  What has been tried: Diet and Exercise  Reducing bread, rice and other carbohydrates.  Put on music and did Berenice for one hour at a time  3 times a week.  Did this in ivtervals  Contributing factors: anxiety triggers eating    Hydration:   Water 6-8 cups a day  Coffee daily (black, very little sugar \"I drink it bitter\"  Soda 2-3 glass a week       Alcohol: \"On a bad day\" (2-3 days a month, 1 glass of wine)  Smoking:  No  Exercise: No exercise for the past 3 months  Weight Monitoring:  No  Sleep:  6+ hr a day  Occupation:  Not working    Past Medical History:   Diagnosis Date    Anemia     Diabetes mellitus (HCC)     Headache(784.0)     Hypertension      Patient denies personal and family history of pancreatitis, thyroid cancer, MEN-2 tumors.  Denies any hx of glaucoma, seizures, kidney stones, gallstones.  Denies Hx of CAD, PAD, palpitations, arrhythmia.   Denies uncontrolled anxiety or depression, suicidal behavior or thinking , insomnia or sleep disturbance.     History reviewed. No pertinent surgical history.  The following portions of the patient's history were reviewed and updated as appropriate: allergies, current medications, past family history, past medical history, past social history, past surgical history, and problem list.    Review Of Systems:  Review of Systems   Constitutional:  Negative for activity change, appetite change, fatigue and fever.   Respiratory:  Negative for cough and shortness of breath.    Cardiovascular:  Negative for chest pain, palpitations " "and leg swelling.   Gastrointestinal:  Negative for abdominal pain, constipation, diarrhea, nausea and vomiting.   Endocrine: Negative for cold intolerance and heat intolerance.   Genitourinary:  Negative for difficulty urinating and dysuria.   Musculoskeletal:  Negative for arthralgias, back pain and gait problem.   Skin:  Negative for pallor and rash.   Neurological:  Negative for headaches.   Psychiatric/Behavioral:  Negative for dysphoric mood, sleep disturbance and suicidal ideas (or HI). The patient is not nervous/anxious.        Objective:  /80   Pulse 83   Ht 5' 5.5\" (1.664 m)   Wt 104 kg (228 lb 9.6 oz)   BMI 37.46 kg/m²   Physical Exam  Vitals and nursing note reviewed.   Constitutional:       General: She is not in acute distress.     Appearance: Normal appearance. She is not ill-appearing or diaphoretic.   Eyes:      General: No scleral icterus.  Cardiovascular:      Rate and Rhythm: Normal rate and regular rhythm.      Pulses: Normal pulses.      Heart sounds: No murmur heard.  Pulmonary:      Effort: Pulmonary effort is normal. No respiratory distress.      Breath sounds: Normal breath sounds. No wheezing or rhonchi.   Abdominal:      General: Bowel sounds are normal. There is no distension.      Palpations: Abdomen is soft. There is no mass.      Tenderness: There is no abdominal tenderness.   Musculoskeletal:      Cervical back: Neck supple.      Right lower leg: No edema.      Left lower leg: No edema.   Lymphadenopathy:      Cervical: No cervical adenopathy.   Skin:     Capillary Refill: Capillary refill takes less than 2 seconds.      Findings: No lesion or rash.   Neurological:      Mental Status: She is alert and oriented to person, place, and time.      Gait: Gait normal.   Psychiatric:         Mood and Affect: Mood normal.         Behavior: Behavior normal.         Labs and Imaging  Recent labs and imaging have been personally reviewed.  Lab Results   Component Value Date    WBC 8.58 " 08/18/2023    HGB 11.1 (L) 08/18/2023    HCT 37.6 08/18/2023    MCV 74 (L) 08/18/2023     08/18/2023     Lab Results   Component Value Date    SODIUM 138 06/30/2023    K 3.7 06/30/2023     06/30/2023    CO2 26 06/30/2023    AGAP 9 06/30/2023    BUN 13 06/30/2023    CREATININE 0.74 06/30/2023    GLUC 104 06/30/2023    GLUF 102 (H) 02/20/2023    CALCIUM 9.3 06/30/2023    AST 22 04/13/2023    ALT 35 04/13/2023    ALKPHOS 83 04/13/2023    TP 7.5 04/13/2023    TBILI 0.22 04/13/2023    EGFR 107 06/30/2023     Lab Results   Component Value Date    HGBA1C 5.7 (H) 06/30/2023     Lab Results   Component Value Date    JHP7TSUXZAVG 4.444 06/29/2023     Lab Results   Component Value Date    CHOLESTEROL 175 06/30/2023     Lab Results   Component Value Date    HDL 43 (L) 06/30/2023     Lab Results   Component Value Date    TRIG 64 06/30/2023     Lab Results   Component Value Date    LDLCALC 119 (H) 06/30/2023

## 2024-02-12 NOTE — PROGRESS NOTES
Behavioral Health Follow Up Note        Name: Homa Easonartlive              [facility] health and wellness -   Patient presents to the office today for a support visit. The pt shared that when she becomes anxious at night, she will eat sweets. Discussed activities to decrease nighttime eating. Reviewed if pt was following guidelines and recommendations given to her by Dr. QUIROGA the pt reported she has not been exercising and some food recommended she was waiting for her spouse to be paid. Reviewed portion sizes and provided portion guide. Education on exercises she can do at home were reviewed. Discussed following guide provided for her and how food logging was beneficial. The pt shared she is currently linked with  therapy and has been linked for a couple of weeks.     Reviewed and discussed  Patient educated and handouts provided.  Adequate hydration  Exercise  Meal planning and preparation  Lifestyle changes  Possible problems with poor eating habits  Practice mindful eating.    Setting aside time to eat slowly, and savor food    Goal: 1- increase activity level.   Next appointment:6/7/24 Dr. QUIROGA

## 2024-02-16 ENCOUNTER — CLINICAL SUPPORT (OUTPATIENT)
Dept: BARIATRICS | Facility: CLINIC | Age: 34
End: 2024-02-16

## 2024-02-16 DIAGNOSIS — E66.9 OBESITY, CLASS II, BMI 35-39.9: Primary | ICD-10-CM

## 2024-02-16 PROCEDURE — RECHECK

## 2024-03-12 ENCOUNTER — OFFICE VISIT (OUTPATIENT)
Dept: FAMILY MEDICINE CLINIC | Facility: CLINIC | Age: 34
End: 2024-03-12

## 2024-03-12 VITALS
DIASTOLIC BLOOD PRESSURE: 86 MMHG | BODY MASS INDEX: 37.83 KG/M2 | SYSTOLIC BLOOD PRESSURE: 138 MMHG | TEMPERATURE: 98.2 F | OXYGEN SATURATION: 98 % | HEART RATE: 91 BPM | WEIGHT: 235.4 LBS | HEIGHT: 66 IN

## 2024-03-12 DIAGNOSIS — E23.6 EMPTY SELLA SYNDROME (HCC): ICD-10-CM

## 2024-03-12 DIAGNOSIS — Z59.819 HOUSING INSTABILITY: ICD-10-CM

## 2024-03-12 DIAGNOSIS — Z59.12 INADEQUATE HOUSING UTILITIES: ICD-10-CM

## 2024-03-12 DIAGNOSIS — Z00.00 ANNUAL PHYSICAL EXAM: ICD-10-CM

## 2024-03-12 DIAGNOSIS — E55.9 VITAMIN D DEFICIENCY: ICD-10-CM

## 2024-03-12 DIAGNOSIS — N91.2 AMENORRHEA: ICD-10-CM

## 2024-03-12 DIAGNOSIS — E78.5 HYPERLIPIDEMIA, UNSPECIFIED HYPERLIPIDEMIA TYPE: ICD-10-CM

## 2024-03-12 DIAGNOSIS — R73.03 PREDIABETES: ICD-10-CM

## 2024-03-12 DIAGNOSIS — Z00.01 ANNUAL VISIT FOR GENERAL ADULT MEDICAL EXAMINATION WITH ABNORMAL FINDINGS: Primary | ICD-10-CM

## 2024-03-12 DIAGNOSIS — I10 HYPERTENSION: ICD-10-CM

## 2024-03-12 DIAGNOSIS — D50.9 IRON DEFICIENCY ANEMIA, UNSPECIFIED IRON DEFICIENCY ANEMIA TYPE: ICD-10-CM

## 2024-03-12 DIAGNOSIS — Z11.3 SCREENING EXAMINATION FOR STD (SEXUALLY TRANSMITTED DISEASE): ICD-10-CM

## 2024-03-12 LAB
EXT PREGNANCY TEST URINE: NEGATIVE
EXT. CONTROL: NORMAL
SL AMB  POCT GLUCOSE, UA: ABNORMAL
SL AMB LEUKOCYTE ESTERASE,UA: ABNORMAL
SL AMB POCT BILIRUBIN,UA: ABNORMAL
SL AMB POCT BLOOD,UA: ABNORMAL
SL AMB POCT CLARITY,UA: CLEAR
SL AMB POCT COLOR,UA: YELLOW
SL AMB POCT HEMOGLOBIN AIC: 5.9 (ref ?–6.5)
SL AMB POCT KETONES,UA: ABNORMAL
SL AMB POCT NITRITE,UA: ABNORMAL
SL AMB POCT PH,UA: 6
SL AMB POCT SPECIFIC GRAVITY,UA: 1.02
SL AMB POCT URINE PROTEIN: ABNORMAL
SL AMB POCT UROBILINOGEN: ABNORMAL

## 2024-03-12 PROCEDURE — 81002 URINALYSIS NONAUTO W/O SCOPE: CPT | Performed by: FAMILY MEDICINE

## 2024-03-12 PROCEDURE — 99395 PREV VISIT EST AGE 18-39: CPT | Performed by: FAMILY MEDICINE

## 2024-03-12 PROCEDURE — 83036 HEMOGLOBIN GLYCOSYLATED A1C: CPT | Performed by: FAMILY MEDICINE

## 2024-03-12 RX ORDER — LOSARTAN POTASSIUM 25 MG/1
25 TABLET ORAL DAILY
Qty: 30 TABLET | Refills: 4 | Status: SHIPPED | OUTPATIENT
Start: 2024-03-12

## 2024-03-12 SDOH — ECONOMIC STABILITY - HOUSING INSECURITY: HOUSING INSTABILITY UNSPECIFIED: Z59.819

## 2024-03-12 SDOH — ECONOMIC STABILITY - HOUSING INSECURITY: INADEQUATE HOUSING UTILITIES: Z59.12

## 2024-03-13 PROBLEM — E78.5 HYPERLIPIDEMIA: Status: ACTIVE | Noted: 2024-03-13

## 2024-03-13 PROBLEM — Z00.01 ANNUAL VISIT FOR GENERAL ADULT MEDICAL EXAMINATION WITH ABNORMAL FINDINGS: Status: ACTIVE | Noted: 2023-07-06

## 2024-03-13 PROBLEM — Z11.3 SCREENING EXAMINATION FOR STD (SEXUALLY TRANSMITTED DISEASE): Status: ACTIVE | Noted: 2023-07-06

## 2024-03-13 PROBLEM — N91.2 AMENORRHEA: Status: ACTIVE | Noted: 2024-03-13

## 2024-03-13 PROBLEM — R73.03 PREDIABETES: Status: ACTIVE | Noted: 2024-03-13

## 2024-03-13 NOTE — PROGRESS NOTES
ADULT ANNUAL PHYSICAL  [facility] [place]    NAME: Homa Black  AGE: 33 y.o. SEX: female  : 1990     DATE: 3/13/2024     Assessment and Plan:     Problem List Items Addressed This Visit       Hypertension     BP Readings from Last 3 Encounters:   24 138/86   24 138/80   24 100/60     Home meds: Losartan 25 mg tablet    PLAN:  Continue Losartan 25 mg              Relevant Medications    losartan (COZAAR) 25 mg tablet    Iron deficiency anemia    Relevant Orders    CBC and differential    Annual visit for general adult medical examination with abnormal findings - Primary     PMH empty sella syndrome, depression, hypertension,migraines, concussion, anemia, elevated glucose.  Fam hx Negative  Meds: Lexapro 5, cozaar 25, ferrous sulfate   Denies smoking, alcohol, IV drug use    PLAN:  Schedule PAP smear  Refused immunizations today  CBC  CMP  Lipid panel  STD screening             Vitamin D deficiency     Last vitamin d level 19.5     Order for vitamin D     We will assess if need to continue 50,000 UI weekly or 1000 daily dose.         Empty sella syndrome (HCC)     Patient pb has IHH , +migraines, obesity, hypertension  She follow up with neurosurgery  Neurosurgery suggested LP to diagnose IHH expect high opening pressure and ophtalmology consult to assess for papilledema  Ophtalmo: +Bilateral papilledema  LP pending Patient will follow up with neurosurgery for lumbar puncture    PLAN:  Follow up with neurosurgery  Consider starting acetazolamide         Amenorrhea     Very light menstrual period spotting 2 months ago  Urine pregnancy test -  UA + blood - infection    PLAN:  Follow up UA in 1 month  Consider starting oral contraceptives         Relevant Orders    POCT urine dip (Completed)    POCT pregnancy, urine (Completed)    Hyperlipidemia     Lab Results   Component Value Date    CHOLESTEROL 175 2023     "CHOLESTEROL 192 02/20/2023     Lab Results   Component Value Date    HDL 43 (L) 06/30/2023    HDL 39 (L) 02/20/2023     Lab Results   Component Value Date    TRIG 64 06/30/2023    TRIG 80 02/20/2023     No results found for: \"NONHDLC\"     PLAN:  - Discontinued lipitor 20  -Lifestyle modifications  -Low ASCVD risk         Relevant Orders    Lipid panel    Prediabetes     Lab Results   Component Value Date    HGBA1C 5.9 03/12/2024     PLAN:  Lifestyle modifications  Exercise 150 mins weekly  Low carb low fat diet         Relevant Orders    Comprehensive metabolic panel    POCT hemoglobin A1c (Completed)     Other Visit Diagnoses       Housing instability        Relevant Orders    Ambulatory referral to social work care management program    Inadequate housing utilities        Relevant Orders    Ambulatory referral to social work care management program            Immunizations and preventive care screenings were discussed with patient today. Appropriate education was printed on patient's after visit summary.    Counseling:  Alcohol/drug use: discussed moderation in alcohol intake, the recommendations for healthy alcohol use, and avoidance of illicit drug use.  Dental Health: discussed importance of regular tooth brushing, flossing, and dental visits.  Injury prevention: discussed safety/seat belts, safety helmets, smoke detectors, carbon dioxide detectors, and smoking near bedding or upholstery.  Sexual health: discussed sexually transmitted diseases, partner selection, use of condoms, avoidance of unintended pregnancy, and contraceptive alternatives.  Exercise: the importance of regular exercise/physical activity was discussed. Recommend exercise 3-5 times per week for at least 30 minutes.          No follow-ups on file.     Chief Complaint:     Chief Complaint   Patient presents with    Hypertension     F/u       History of Present Illness:     Adult Annual Physical   Patient here for a comprehensive physical exam. " The patient reports problems - Pb IHH, empty sella syndrome, hypertension, hyperlipidemia, prediabetes, vitamin d deficiency, anemia .    Diet and Physical Activity  Diet/Nutrition: well balanced diet.   Exercise: walking.      Depression Screening  PHQ-2/9 Depression Screening    Little interest or pleasure in doing things: 1 - several days  Feeling down, depressed, or hopeless: 1 - several days  Trouble falling or staying asleep, or sleeping too much: 1 - several days  Feeling tired or having little energy: 2 - more than half the days  Poor appetite or overeatin - more than half the days  Feeling bad about yourself - or that you are a failure or have let yourself or your family down: 1 - several days  Trouble concentrating on things, such as reading the newspaper or watching television: 1 - several days  Moving or speaking so slowly that other people could have noticed. Or the opposite - being so fidgety or restless that you have been moving around a lot more than usual: 0 - not at all  Thoughts that you would be better off dead, or of hurting yourself in some way: 0 - not at all  PHQ-9 Score: 9  PHQ-9 Interpretation: Mild depression       General Health  Sleep: sleeps well.   Hearing: normal - bilateral.  Vision: no vision problems.   Dental: regular dental visits.       /GYN Health  Follows with gynecology? yes   Last menstrual period: 24  Contraceptive method:  No contraception .  History of STDs?: no.     Advanced Care Planning  Do you have an advanced directive? no  Do you have a durable medical power of ? no  ACP document given to the patient? no      Review of Systems:     Review of Systems   Constitutional:  Negative for chills and fever.   HENT:  Negative for ear pain and sore throat.    Eyes:  Negative for pain and visual disturbance.   Respiratory:  Negative for cough and shortness of breath.    Cardiovascular:  Negative for chest pain and palpitations.   Gastrointestinal:  Negative  for abdominal pain and vomiting.   Genitourinary:  Negative for dysuria and hematuria.   Musculoskeletal:  Negative for arthralgias and back pain.   Skin:  Negative for color change and rash.   Neurological:  Negative for seizures and syncope.   All other systems reviewed and are negative.     Past Medical History:     Past Medical History:   Diagnosis Date    Anemia     Diabetes mellitus (HCC)     Headache(784.0)     Hypertension       Past Surgical History:     No past surgical history on file.   Social History:     Social History     Socioeconomic History    Marital status: /Civil Union     Spouse name: None    Number of children: None    Years of education: None    Highest education level: None   Occupational History    None   Tobacco Use    Smoking status: Never     Passive exposure: Never    Smokeless tobacco: Never   Vaping Use    Vaping status: Never Used   Substance and Sexual Activity    Alcohol use: Not Currently     Comment: once/month    Drug use: Never    Sexual activity: Yes     Partners: Female     Birth control/protection: None   Other Topics Concern    None   Social History Narrative    None     Social Determinants of Health     Financial Resource Strain: High Risk (7/6/2023)    Overall Financial Resource Strain (CARDIA)     Difficulty of Paying Living Expenses: Hard   Food Insecurity: No Food Insecurity (7/6/2023)    Hunger Vital Sign     Worried About Running Out of Food in the Last Year: Never true     Ran Out of Food in the Last Year: Never true   Transportation Needs: No Transportation Needs (7/6/2023)    PRAPARE - Transportation     Lack of Transportation (Medical): No     Lack of Transportation (Non-Medical): No   Physical Activity: Not on file   Stress: Not on file   Social Connections: Not on file   Intimate Partner Violence: Not on file   Housing Stability: High Risk (3/12/2024)    Housing Stability Vital Sign     Unable to Pay for Housing in the Last Year: Yes     Number of Places  "Lived in the Last Year: 1     Unstable Housing in the Last Year: No      Family History:     Family History   Problem Relation Age of Onset    Breast cancer Neg Hx     Colon cancer Neg Hx     Ovarian cancer Neg Hx     Cancer Neg Hx       Current Medications:     Current Outpatient Medications   Medication Sig Dispense Refill    losartan (COZAAR) 25 mg tablet Take 1 tablet (25 mg total) by mouth daily 30 tablet 4    Blood Pressure KIT Use every other day 1 kit 0    ergocalciferol (VITAMIN D2) 50,000 units Take 1 capsule (50,000 Units total) by mouth once a week 8 capsule 0    escitalopram (LEXAPRO) 5 mg tablet Take 5 mg by mouth daily      ferrous sulfate 324 (65 Fe) mg Take 1 tablet (324 mg total) by mouth daily before breakfast 30 tablet 4     No current facility-administered medications for this visit.      Allergies:     Allergies   Allergen Reactions    Pollen Extract Other (See Comments)     Itchy eyes    Topiramate Palpitations      Physical Exam:     /86 (BP Location: Left arm, Patient Position: Sitting, Cuff Size: Standard)   Pulse 91   Temp 98.2 °F (36.8 °C) (Temporal)   Ht 5' 5.5\" (1.664 m)   Wt 107 kg (235 lb 6.4 oz)   LMP 03/09/2024 (Exact Date) Comment: irregular  SpO2 98%   BMI 38.58 kg/m²     Physical Exam  Vitals and nursing note reviewed.   Constitutional:       General: She is not in acute distress.     Appearance: She is well-developed.   HENT:      Head: Normocephalic and atraumatic.   Eyes:      Conjunctiva/sclera: Conjunctivae normal.   Cardiovascular:      Rate and Rhythm: Normal rate and regular rhythm.      Heart sounds: No murmur heard.  Pulmonary:      Effort: Pulmonary effort is normal. No respiratory distress.      Breath sounds: Normal breath sounds.   Abdominal:      Palpations: Abdomen is soft.      Tenderness: There is no abdominal tenderness.   Musculoskeletal:         General: No swelling.      Cervical back: Neck supple.   Skin:     General: Skin is warm and dry.      " Capillary Refill: Capillary refill takes less than 2 seconds.   Neurological:      General: No focal deficit present.      Mental Status: She is alert and oriented to person, place, and time.   Psychiatric:         Mood and Affect: Mood normal.          Kamilah Mcintyre MD   [facility] [place]

## 2024-03-13 NOTE — ASSESSMENT & PLAN NOTE
PMH empty sella syndrome, depression, hypertension,migraines, concussion, anemia, elevated glucose.  Fam hx Negative  Meds: Lexapro 5, cozaar 25, ferrous sulfate   Denies smoking, alcohol, IV drug use    PLAN:  Schedule PAP smear  Refused immunizations today  CBC  CMP  Lipid panel  STD screening

## 2024-03-13 NOTE — ASSESSMENT & PLAN NOTE
Lab Results   Component Value Date    HGBA1C 5.9 03/12/2024     PLAN:  Lifestyle modifications  Exercise 150 mins weekly  Low carb low fat diet

## 2024-03-13 NOTE — ASSESSMENT & PLAN NOTE
"Lab Results   Component Value Date    CHOLESTEROL 175 06/30/2023    CHOLESTEROL 192 02/20/2023     Lab Results   Component Value Date    HDL 43 (L) 06/30/2023    HDL 39 (L) 02/20/2023     Lab Results   Component Value Date    TRIG 64 06/30/2023    TRIG 80 02/20/2023     No results found for: \"NONHDLC\"     PLAN:  - Discontinued lipitor 20  -Lifestyle modifications  -Low ASCVD risk  "

## 2024-03-13 NOTE — ASSESSMENT & PLAN NOTE
BP Readings from Last 3 Encounters:   03/12/24 138/86   02/07/24 138/80   01/25/24 100/60     Home meds: Losartan 25 mg tablet    PLAN:  Continue Losartan 25 mg

## 2024-03-13 NOTE — ASSESSMENT & PLAN NOTE
Last vitamin d level 19.5 08/23    Order for vitamin D     We will assess if need to continue 50,000 UI weekly or 1000 daily dose.

## 2024-03-13 NOTE — ASSESSMENT & PLAN NOTE
Very light menstrual period spotting 2 months ago  Urine pregnancy test -  UA + blood - infection    PLAN:  Follow up UA in 1 month  Consider starting oral contraceptives

## 2024-03-13 NOTE — ASSESSMENT & PLAN NOTE
Patient pb has IHH , +migraines, obesity, hypertension  She follow up with neurosurgery  Neurosurgery suggested LP to diagnose IHH expect high opening pressure and ophtalmology consult to assess for papilledema  Ophtalmo: +Bilateral papilledema  LP pending Patient will follow up with neurosurgery for lumbar puncture    PLAN:  Follow up with neurosurgery  Consider starting acetazolamide

## 2024-03-14 ENCOUNTER — APPOINTMENT (OUTPATIENT)
Dept: LAB | Facility: CLINIC | Age: 34
End: 2024-03-14
Payer: COMMERCIAL

## 2024-03-14 DIAGNOSIS — E55.9 VITAMIN D DEFICIENCY: ICD-10-CM

## 2024-03-14 DIAGNOSIS — D50.9 IRON DEFICIENCY ANEMIA, UNSPECIFIED IRON DEFICIENCY ANEMIA TYPE: ICD-10-CM

## 2024-03-14 DIAGNOSIS — Z11.3 SCREENING EXAMINATION FOR STD (SEXUALLY TRANSMITTED DISEASE): ICD-10-CM

## 2024-03-14 DIAGNOSIS — R73.03 PREDIABETES: ICD-10-CM

## 2024-03-14 DIAGNOSIS — E78.5 HYPERLIPIDEMIA, UNSPECIFIED HYPERLIPIDEMIA TYPE: ICD-10-CM

## 2024-03-14 LAB
ALBUMIN SERPL BCP-MCNC: 4.1 G/DL (ref 3.5–5)
ALP SERPL-CCNC: 72 U/L (ref 34–104)
ALT SERPL W P-5'-P-CCNC: 28 U/L (ref 7–52)
ANION GAP SERPL CALCULATED.3IONS-SCNC: 9 MMOL/L (ref 4–13)
AST SERPL W P-5'-P-CCNC: 19 U/L (ref 13–39)
BASOPHILS # BLD AUTO: 0.05 THOUSANDS/ÂΜL (ref 0–0.1)
BASOPHILS NFR BLD AUTO: 1 % (ref 0–1)
BILIRUB SERPL-MCNC: 0.36 MG/DL (ref 0.2–1)
BUN SERPL-MCNC: 19 MG/DL (ref 5–25)
CALCIUM SERPL-MCNC: 9.3 MG/DL (ref 8.4–10.2)
CHLORIDE SERPL-SCNC: 104 MMOL/L (ref 96–108)
CHOLEST SERPL-MCNC: 205 MG/DL
CO2 SERPL-SCNC: 27 MMOL/L (ref 21–32)
CREAT SERPL-MCNC: 0.85 MG/DL (ref 0.6–1.3)
EOSINOPHIL # BLD AUTO: 0.16 THOUSAND/ÂΜL (ref 0–0.61)
EOSINOPHIL NFR BLD AUTO: 2 % (ref 0–6)
ERYTHROCYTE [DISTWIDTH] IN BLOOD BY AUTOMATED COUNT: 18.6 % (ref 11.6–15.1)
GFR SERPL CREATININE-BSD FRML MDRD: 90 ML/MIN/1.73SQ M
GLUCOSE P FAST SERPL-MCNC: 77 MG/DL (ref 65–99)
HCT VFR BLD AUTO: 39.7 % (ref 34.8–46.1)
HDLC SERPL-MCNC: 49 MG/DL
HGB BLD-MCNC: 11.4 G/DL (ref 11.5–15.4)
IMM GRANULOCYTES # BLD AUTO: 0.01 THOUSAND/UL (ref 0–0.2)
IMM GRANULOCYTES NFR BLD AUTO: 0 % (ref 0–2)
LDLC SERPL CALC-MCNC: 139 MG/DL (ref 0–100)
LYMPHOCYTES # BLD AUTO: 3.44 THOUSANDS/ÂΜL (ref 0.6–4.47)
LYMPHOCYTES NFR BLD AUTO: 42 % (ref 14–44)
MCH RBC QN AUTO: 21.7 PG (ref 26.8–34.3)
MCHC RBC AUTO-ENTMCNC: 28.7 G/DL (ref 31.4–37.4)
MCV RBC AUTO: 76 FL (ref 82–98)
MONOCYTES # BLD AUTO: 0.68 THOUSAND/ÂΜL (ref 0.17–1.22)
MONOCYTES NFR BLD AUTO: 8 % (ref 4–12)
NEUTROPHILS # BLD AUTO: 3.92 THOUSANDS/ÂΜL (ref 1.85–7.62)
NEUTS SEG NFR BLD AUTO: 47 % (ref 43–75)
NONHDLC SERPL-MCNC: 156 MG/DL
NRBC BLD AUTO-RTO: 0 /100 WBCS
PLATELET # BLD AUTO: 343 THOUSANDS/UL (ref 149–390)
PMV BLD AUTO: 12 FL (ref 8.9–12.7)
POTASSIUM SERPL-SCNC: 4.1 MMOL/L (ref 3.5–5.3)
PROT SERPL-MCNC: 7.4 G/DL (ref 6.4–8.4)
RBC # BLD AUTO: 5.26 MILLION/UL (ref 3.81–5.12)
SODIUM SERPL-SCNC: 140 MMOL/L (ref 135–147)
TRIGL SERPL-MCNC: 83 MG/DL
WBC # BLD AUTO: 8.26 THOUSAND/UL (ref 4.31–10.16)

## 2024-03-14 PROCEDURE — 85025 COMPLETE CBC W/AUTO DIFF WBC: CPT

## 2024-03-14 PROCEDURE — 86780 TREPONEMA PALLIDUM: CPT

## 2024-03-14 PROCEDURE — 87389 HIV-1 AG W/HIV-1&-2 AB AG IA: CPT

## 2024-03-14 PROCEDURE — 82306 VITAMIN D 25 HYDROXY: CPT

## 2024-03-14 PROCEDURE — 80053 COMPREHEN METABOLIC PANEL: CPT

## 2024-03-14 PROCEDURE — 80061 LIPID PANEL: CPT

## 2024-03-14 PROCEDURE — 36415 COLL VENOUS BLD VENIPUNCTURE: CPT

## 2024-03-15 LAB
25(OH)D3 SERPL-MCNC: 15.9 NG/ML (ref 30–100)
HIV 1+2 AB+HIV1 P24 AG SERPL QL IA: NORMAL
HIV 2 AB SERPL QL IA: NORMAL
HIV1 AB SERPL QL IA: NORMAL
HIV1 P24 AG SERPL QL IA: NORMAL
TREPONEMA PALLIDUM IGG+IGM AB [PRESENCE] IN SERUM OR PLASMA BY IMMUNOASSAY: NORMAL

## 2024-03-22 ENCOUNTER — APPOINTMENT (OUTPATIENT)
Dept: LAB | Facility: CLINIC | Age: 34
End: 2024-03-22
Payer: COMMERCIAL

## 2024-03-22 DIAGNOSIS — Z11.3 SCREEN FOR SEXUALLY TRANSMITTED DISEASES: ICD-10-CM

## 2024-03-22 PROCEDURE — 87491 CHLMYD TRACH DNA AMP PROBE: CPT

## 2024-03-22 PROCEDURE — 87591 N.GONORRHOEAE DNA AMP PROB: CPT

## 2024-03-23 LAB
C TRACH DNA SPEC QL NAA+PROBE: NEGATIVE
N GONORRHOEA DNA SPEC QL NAA+PROBE: NEGATIVE

## 2024-03-26 ENCOUNTER — TELEPHONE (OUTPATIENT)
Dept: FAMILY MEDICINE CLINIC | Facility: CLINIC | Age: 34
End: 2024-03-26

## 2024-03-26 DIAGNOSIS — E55.9 VITAMIN D DEFICIENCY: Primary | ICD-10-CM

## 2024-03-26 DIAGNOSIS — D50.9 IRON DEFICIENCY ANEMIA, UNSPECIFIED IRON DEFICIENCY ANEMIA TYPE: ICD-10-CM

## 2024-03-26 RX ORDER — FERROUS SULFATE 324(65)MG
324 TABLET, DELAYED RELEASE (ENTERIC COATED) ORAL
Qty: 30 TABLET | Refills: 4 | Status: SHIPPED | OUTPATIENT
Start: 2024-03-26

## 2024-03-26 RX ORDER — ERGOCALCIFEROL 1.25 MG/1
50000 CAPSULE ORAL WEEKLY
Qty: 8 CAPSULE | Refills: 0 | Status: SHIPPED | OUTPATIENT
Start: 2024-03-26

## 2024-03-26 NOTE — TELEPHONE ENCOUNTER
Called patient for lab results she will start taking vitamin D weekly and ferrous sulfate for iron deficiency anemia.  Upcoming appointment with PCP soon

## 2024-03-30 PROBLEM — B00.9 HERPES INFECTION: Status: RESOLVED | Noted: 2023-09-18 | Resolved: 2024-03-30

## 2024-03-30 PROBLEM — E55.9 VITAMIN D DEFICIENCY: Status: RESOLVED | Noted: 2023-09-18 | Resolved: 2024-03-30

## 2024-03-30 PROBLEM — S06.0X9A CONCUSSION WITH LOSS OF CONSCIOUSNESS: Status: RESOLVED | Noted: 2023-06-29 | Resolved: 2024-03-30

## 2024-03-30 PROBLEM — E66.9 OBESITY (BMI 30-39.9): Status: RESOLVED | Noted: 2023-02-20 | Resolved: 2024-03-30

## 2024-03-30 PROBLEM — N91.2 AMENORRHEA: Status: RESOLVED | Noted: 2024-03-13 | Resolved: 2024-03-30

## 2024-03-30 PROBLEM — Z00.01 ANNUAL VISIT FOR GENERAL ADULT MEDICAL EXAMINATION WITH ABNORMAL FINDINGS: Status: RESOLVED | Noted: 2023-07-06 | Resolved: 2024-03-30

## 2024-03-30 PROBLEM — E23.6 EMPTY SELLA SYNDROME (HCC): Status: RESOLVED | Noted: 2023-12-18 | Resolved: 2024-03-30

## 2024-03-30 PROBLEM — D50.9 IRON DEFICIENCY ANEMIA: Status: RESOLVED | Noted: 2023-03-20 | Resolved: 2024-03-30

## 2024-03-30 NOTE — PROGRESS NOTES
ANNUAL GYNECOLOGICAL EXAMINATION    Homa Black is a 33 y.o. female who presents today for annual GYN exam.  Her last pap smear was performed 3/23/2022 and result was NILM with negative HPV.  She reports no history of abnormal pap smears in her past.  She had HIV screening performed 3/14/2024 and it was negative.  She reports menses as regular.  Patient's last menstrual period was 2024 (exact date).  Her general medical history has been reviewed and she reports it as follows:    Past Medical History:   Diagnosis Date    Depression     Empty sella syndrome (HCC)     Hyperlipidemia     Hypertension     Migraines     Prediabetes      Past Surgical History:   Procedure Laterality Date    NO PAST SURGERIES       OB History          2    Para   2    Term   2       0    AB   0    Living   2         SAB   0    IAB   0    Ectopic   0    Multiple   0    Live Births   2               Social History     Tobacco Use    Smoking status: Never     Passive exposure: Never    Smokeless tobacco: Never   Vaping Use    Vaping status: Never Used   Substance Use Topics    Alcohol use: Yes     Comment: couple times/month    Drug use: Never     Social History     Substance and Sexual Activity   Sexual Activity Yes    Partners: Male    Birth control/protection: Coitus interruptus     Cancer-related family history includes Cancer in her sister. There is no history of Breast cancer, Colon cancer, or Ovarian cancer.    Current Outpatient Medications   Medication Instructions    escitalopram (LEXAPRO) 5 mg, Oral, Daily    ferrous sulfate 324 mg, Oral, Daily before breakfast    losartan (COZAAR) 25 mg, Oral, Daily       Review of Systems:  Review of Systems   Constitutional: Negative.    Gastrointestinal: Negative.    Genitourinary:  Positive for vaginal discharge (and odor). Negative for difficulty urinating, menstrual problem and pelvic pain.   Skin: Negative.        Physical Exam:  /100 (BP Location: Right  "arm, Patient Position: Sitting)   Pulse 82   Ht 5' 5.5\" (1.664 m)   Wt 106 kg (233 lb 6.4 oz)   LMP 03/09/2024 (Exact Date)   BMI 38.25 kg/m²   Physical Exam  Constitutional:       General: She is not in acute distress.     Appearance: She is well-developed.   Genitourinary:      Vulva normal.      No lesions in the vagina.      Vaginal discharge (thin, white) present.        Right Adnexa: not tender and no mass present.     Left Adnexa: not tender and no mass present.     No cervical motion tenderness or lesion.      Uterus is not tender.   Breasts:     Right: No mass, nipple discharge, skin change or tenderness.      Left: No mass, nipple discharge, skin change or tenderness.   Neck:      Thyroid: No thyromegaly.   Cardiovascular:      Rate and Rhythm: Normal rate and regular rhythm.   Pulmonary:      Effort: Pulmonary effort is normal.   Abdominal:      Palpations: Abdomen is soft.      Tenderness: There is no abdominal tenderness.   Musculoskeletal:      Cervical back: Neck supple.   Neurological:      Mental Status: She is alert and oriented to person, place, and time.   Skin:     General: Skin is warm and dry.   Vitals reviewed.       Point of Care Testing:   -Wet mount: + clue cells, no trichomonads, few WBC's, pH=5.0   -KOH mount: no hyphae   -Whiff: positive    Assessment/Plan:   1. Normal well-woman GYN exam.  2. Cervical cancer screening:  Normal cervical exam.  Pap smear not indicated at this time.  Has not received HPV vaccine in the past.  Offered vaccine series to patient now and she declines.     3. STD screening:  Patient declines.   4. Breast cancer screening:  Normal breast exam.  Reviewed breast self-awareness.   5. Depression Screening: Patient's depression screening was assessed with a PHQ-2 score of 4. Their PHQ-9 score was 16. Continue regular follow-up with their psychologist/therapist/psychiatrist who is managing their mental health condition(s).   6. BMI Counseling: Body mass index is " 38.25 kg/m².  Discussed the patient's BMI with her. The BMI is above normal. Nutrition recommendations include reducing portion sizes and decreasing overall calorie intake.   7. Contraception:  Declines.   8. BV:  Given Rx Flagyl.   9. Return to office in 1 year for annual GYN exam.    Reviewed with patient that [facility], but that they will not necessarily be reviewed by me immediately.  Explained that I will review results at my earliest opportunity and contact patient appropriately.

## 2024-04-01 ENCOUNTER — ANNUAL EXAM (OUTPATIENT)
Dept: OBGYN CLINIC | Facility: CLINIC | Age: 34
End: 2024-04-01

## 2024-04-01 VITALS
BODY MASS INDEX: 37.51 KG/M2 | HEIGHT: 66 IN | DIASTOLIC BLOOD PRESSURE: 100 MMHG | SYSTOLIC BLOOD PRESSURE: 154 MMHG | HEART RATE: 82 BPM | WEIGHT: 233.4 LBS

## 2024-04-01 DIAGNOSIS — N76.0 BV (BACTERIAL VAGINOSIS): ICD-10-CM

## 2024-04-01 DIAGNOSIS — Z01.419 ENCOUNTER FOR GYNECOLOGICAL EXAMINATION WITHOUT ABNORMAL FINDING: Primary | ICD-10-CM

## 2024-04-01 DIAGNOSIS — Z59.9 FINANCIAL DIFFICULTIES: ICD-10-CM

## 2024-04-01 DIAGNOSIS — N89.8 VAGINAL DISCHARGE: ICD-10-CM

## 2024-04-01 DIAGNOSIS — Z59.819 HOUSING INSTABILITY: ICD-10-CM

## 2024-04-01 DIAGNOSIS — Z59.12 INADEQUATE HOUSING UTILITIES: ICD-10-CM

## 2024-04-01 DIAGNOSIS — N89.8 VAGINAL ODOR: ICD-10-CM

## 2024-04-01 DIAGNOSIS — B96.89 BV (BACTERIAL VAGINOSIS): ICD-10-CM

## 2024-04-01 DIAGNOSIS — Z59.41 FOOD INSECURITY: ICD-10-CM

## 2024-04-01 DIAGNOSIS — Z12.39 ENCOUNTER FOR BREAST CANCER SCREENING USING NON-MAMMOGRAM MODALITY: ICD-10-CM

## 2024-04-01 DIAGNOSIS — Z12.4 SCREENING FOR CERVICAL CANCER: ICD-10-CM

## 2024-04-01 LAB
BV WHIFF TEST VAG QL: ABNORMAL
CLUE CELLS SPEC QL WET PREP: ABNORMAL
PH SMN: 5 [PH]
SL AMB POCT WET MOUNT: ABNORMAL
T VAGINALIS VAG QL WET PREP: ABNORMAL
YEAST VAG QL WET PREP: ABNORMAL

## 2024-04-01 PROCEDURE — G9919 SCRN ND POS ND PROV OF REC: HCPCS | Performed by: NURSE PRACTITIONER

## 2024-04-01 PROCEDURE — G9920 SCRNING PERF AND NEGATIVE: HCPCS | Performed by: NURSE PRACTITIONER

## 2024-04-01 PROCEDURE — 99395 PREV VISIT EST AGE 18-39: CPT | Performed by: NURSE PRACTITIONER

## 2024-04-01 PROCEDURE — 87210 SMEAR WET MOUNT SALINE/INK: CPT | Performed by: NURSE PRACTITIONER

## 2024-04-01 RX ORDER — METRONIDAZOLE 500 MG/1
500 TABLET ORAL 2 TIMES DAILY
Qty: 14 TABLET | Refills: 0 | Status: SHIPPED | OUTPATIENT
Start: 2024-04-01 | End: 2024-04-08

## 2024-04-01 SDOH — ECONOMIC STABILITY - HOUSING INSECURITY: HOUSING INSTABILITY UNSPECIFIED: Z59.819

## 2024-04-01 SDOH — ECONOMIC STABILITY - INCOME SECURITY: PROBLEM RELATED TO HOUSING AND ECONOMIC CIRCUMSTANCES, UNSPECIFIED: Z59.9

## 2024-04-01 SDOH — ECONOMIC STABILITY - FOOD INSECURITY: FOOD INSECURITY: Z59.41

## 2024-04-01 SDOH — ECONOMIC STABILITY - HOUSING INSECURITY: INADEQUATE HOUSING UTILITIES: Z59.12

## 2024-04-01 NOTE — PATIENT INSTRUCTIONS
Elizabeth por jerome confianza en nuestro equipo.   Le agradecemos y agradecemos edward comentarios.   Si recibe roberto encuesta nuestra, tómese unos momentos para informarnos cómo estamos.   Sinceramente,  ARGENTINA LucianoNP       OBESIDAD     Obesidad es cuando jerome índice de masa corporal (IMC) es superior a 30. Jerome Body mass index is 38.25 kg/m²..    Los riesgos de la obesidad incluyen  muchos problemas de reina, incluidas las lesiones y la discapacidad física. Es posible que deba realizarse exámenes para detectar lo siguiente:  Diabetes     Hipertensión o colesterol altoEnfermedades del corazón     Enfermedad cardíaca     Enfermedades del hígado o de la vesícula biliar     Cáncer de colon, de pecho, de próstata, de hígado o de riñón     El apnea del sueño     Artritis o gota    Busque atención médica de inmediato si:   Usted tiene un intenso dolor de clarke, confusión o dificultad para hablar.     Usted tiene debilidad en un lado del cuerpo.     Usted tiene dolor en el pecho, sudoración o falta de aire.    Pregúntele a jerome médico qué vitaminas y minerales son adecuados para usted.   Usted tiene síntomas de enfermedad de la vesícula biliar o el hígado, maikol dolor en la parte superior del abdomen.    Usted tiene dolor e incomodidad de rodillas o caderas al caminar.     Usted presenta síntomas de diabetes, maikol exceso de apetito y sed y micción frecuente.     Usted presenta síntomas de apnea de sueño, maikol roncar o tener sueño donte el día.     Usted tiene preguntas o inquietudes acerca de jerome condición o cuidado.    El tratamiento para la obesidad  se enfoca en ayudarle a bajar de peso para mejorar jerome reina. Incluso roberto reducción mínima del índice de masa corporal puede reducir el riesgo de muchos problemas de reina. Jerome médico lo ayudará a fijarse roberto meta para bajar de peso.  Cambios en el estilo de milla  son los primeros pasos para tratar la obesidad. Estos cambios incluyen shelby decisiones para consumir alimentos  saludables y realizar roberto actividad física con regularidad. El médico puede recomendarle un programa para bajar de peso que consta de capacitación, educación y terapia.     Medicamento  le pueden ayudar a bajar de peso cuando los utiliza en conjunto con roberto dieta y actividad física.     Cirugía  le puede ayudar a bajar de peso si usted es muy mirta y presenta otros problemas de reina. Existen varios tipos de cirugía para adelgazar. Pídale a jerome médico más información.    Cómo perder peso de forma exitosa:   Propóngase metas accesibles y realistas.  Un ejemplo de roberto meta accesible es caminar 20 minutos los 5 días de la semana. Otro objetivo puede ser perder 5% de jerome peso corporal.    Coméntele a edward amigos, familiares y compañeros de trabajo sobre edward metas  y solicite que lo apoyen. Convide a un amigo para hacer ejercicio juntos o acuda a un brian de motivación para bajar de peso.    Identifique los alimentos o situaciones que le pueden provocar que coma en exceso y busque formas para evitarlos. Deshágase de alimentos altos en calorías en jerome hogar o en el trabajo. En la cocina tenga roberto canasta con frutas frescas. Si el estrés es la causa para que usted coma más encuentre formas para sobrellevar el estrés.     Lleve un diario en el que registre lo que usted come y beka.  También escriba la cantidad de tiempo que pasa realizando roberto actividad física donte el día. Pésese roberto vez a la semana y anótelo en jerome diario.    Cambios en la alimentación:  Usted necesitará consumir menos de 500 a 1.000 calorías al día de las que usted actualmente consume para perder entre 1 a 2 libras a la semana. Los siguientes cambios le ayudarán a disminuir la cantidad de las calorías que normalmente consume:  Reduzca el tamaño de las porciones.  Utilice platos pequeños que no midan más de 9 pulgadas de diámetro. Llene la mitad del plato con frutas y verduras. Utilice las tazas medidoras para racionar los alimentos hasta que usted sepa amikol  se ve el tamaño de roberto porción.     Consuma 3 comidas y 1 o 2 meriendas al día.  Planee edward comidas con anterioridad. Cocine y coma en la casa todo el tiempo que le sea posible. Coma lentamente.     Consuma frutas y verduras con todas las comidas.  Las frutas y verduras son bajas en calorías y altas en fibra lo cual lo llena. No adicione mantequilla, ni margarina, ni salsas a base de crema a las verduras. Utilice las hierbas para sazonar las verduras al vapor.     Consuma menos grasas y alimentos fritos.  Consuma jaida o pescado al horno o la dionicio. Estas proteínas son más bajas en calorías y grasas que la carne magra. Limite las comidas rápidas. Es mejor que utilice aderezos para la ensalada a base de aceite de dennison y vinagre en vez de aderezos en botella.     Limite la cantidad de azúcar que consume.  No consuma bebidas azucaradas. Limite el consumo de alcohol.    Cambios de actividad:  La actividad física es buena para jerome cuerpo por muchas razones. Le ayuda a quemar calorías y fortalecer edward músculos. Disminuye el estrés y la depresión y mejora jerome estado de ánimo. Además puede ayudarle a dormir mejor. Consulte con jerome médico antes de empezar un régimen de ejercicios.  Realice roberto actividad física por lo menos por 30 minutos 5 días a la semana.  Empiece despacio. Aparte tiempo cada día para roberto actividad física que usted disfrute y que le sea conveniente. Es mejor realizar tanto un programa de pesas maikol roberto actividad para aumentar jerome ritmo cardíaco, maikol caminar, montar en bicicleta o nadar.     Busque formas para ser más activo.  Realice roberto actividad de jardinería y limpiar la casa. Suba las escaleras en vez de utilizar el elevador. En jerome tiempo megha concurra a eventos que le exijan caminar, maikol festivales y ferias al aire megha. Adicionar esta actividad física puede ayudarle a bajar y mantener el peso.    Acuda a edward consultas de control con jerome médico según le indicaron.  Puede que necesite consultar con  un dietista. Anote edward preguntas para que se acuerde de hacerlas donte edward visitas.

## 2024-04-03 ENCOUNTER — PATIENT OUTREACH (OUTPATIENT)
Dept: OBGYN CLINIC | Facility: CLINIC | Age: 34
End: 2024-04-03

## 2024-04-03 NOTE — PROGRESS NOTES
ANETA CASILLAS contacted PT with  service ID [id number] to address needs. PT is a 32yo   Woman primary language Armenian. PT reports financial difficulty and being behind on several bills such as water, gas, electric and rent. PT is unaware if  has them on a payment plan due to  being the primary to pay bills. PT reports that she is not working at this time due to a concussion that she has not received a clear for to go back to work. PT requested a call back when  is home to speak about difficulty. ANETA CASILLAS will return the call tomorrow morning.

## 2024-04-04 ENCOUNTER — PATIENT OUTREACH (OUTPATIENT)
Dept: FAMILY MEDICINE CLINIC | Facility: CLINIC | Age: 34
End: 2024-04-04

## 2024-04-04 NOTE — PROGRESS NOTES
ANETA CASILLAS received IB message from  ANETA Allen stating a consult was placed due to patient having financial difficulty. Tiffany completed assessment and will continue to follow-up. This ANETA CASILLAS will close the referral placed by PCP as Tiffany will continue to follow-up with pt.     Will remains available for future consult as needed.

## 2024-04-08 ENCOUNTER — PATIENT OUTREACH (OUTPATIENT)
Dept: OBGYN CLINIC | Facility: CLINIC | Age: 34
End: 2024-04-08

## 2024-04-08 NOTE — PROGRESS NOTES
ANETA CASILLAS contacted PT using  service ID [id number]. PT reports that her  is at work and his day off is Friday. ANETA CASILLAS will return call this Friday to contact PT with support of  for understanding of financial difficulties.

## 2024-04-19 ENCOUNTER — PATIENT OUTREACH (OUTPATIENT)
Dept: OBGYN CLINIC | Facility: CLINIC | Age: 34
End: 2024-04-19

## 2024-04-19 NOTE — PROGRESS NOTES
ANETA CASILLAS was referred to complete a SOC psych assessment to address Pt needs with  service ID [id number]. Pt is a 33 y.o.  female .      Pt reports that her  is with her and requested ANETA CASILLAS speak with both on speaker due to  who manages finances.  of Pt reports that they are behind on utilities. Pt reports that they were previously apart of the on track program but they are not sure if that has continued. ANETA CASILLAS encouraged pt to contact services to learn if they are still active in their payment plans. Pt reported food insecurity. ANETA CASILLAS used find help resource to provide Pt with food pantries. Pt confirms receiving the information and able to review it in Mongolian translation.     ANETA CASILLAS will f/u with Pt next week regarding resources

## 2024-04-29 ENCOUNTER — PATIENT OUTREACH (OUTPATIENT)
Dept: OBGYN CLINIC | Facility: CLINIC | Age: 34
End: 2024-04-29

## 2024-04-29 NOTE — PROGRESS NOTES
ANETA CASILLAS contacted Pt with  service ID [id number]. Pt reports that her and her  are doing well. Pt reports that she still has food pantry resource information and plans on contacting resources today. Pt denies additional f/u from ANETA CASILLAS. ANETA CASILLAS encouraged pt to contact ANETA CASILLAS for additional support if needed. ANETA CASILLAS will otherwise close this encounter at this time.

## 2024-05-02 ENCOUNTER — HOSPITAL ENCOUNTER (EMERGENCY)
Facility: HOSPITAL | Age: 34
Discharge: HOME | End: 2024-05-02
Attending: EMERGENCY MEDICINE
Payer: COMMERCIAL

## 2024-05-02 VITALS
BODY MASS INDEX: 38.59 KG/M2 | WEIGHT: 235.45 LBS | DIASTOLIC BLOOD PRESSURE: 107 MMHG | HEART RATE: 83 BPM | TEMPERATURE: 98.1 F | SYSTOLIC BLOOD PRESSURE: 160 MMHG | OXYGEN SATURATION: 100 % | RESPIRATION RATE: 16 BRPM

## 2024-05-02 DIAGNOSIS — I10 ELEVATED BLOOD PRESSURE READING WITH DIAGNOSIS OF HYPERTENSION: Primary | ICD-10-CM

## 2024-05-02 PROCEDURE — 99283 EMERGENCY DEPT VISIT LOW MDM: CPT | Performed by: EMERGENCY MEDICINE

## 2024-05-02 PROCEDURE — 99283 EMERGENCY DEPT VISIT LOW MDM: CPT

## 2024-05-02 NOTE — ED PROVIDER NOTES
History  Chief Complaint   Patient presents with    Hypertension     Pt states that she was at physical therapy and they took her BP and it was high. Pt has history of HTN and has been compliant with medications. Pt also complaining of right sided facial numbness and nausea x3 days. Pt denies headache, and any other complaints.      32 yo F with HTH, referred to the ED from PT, where she is getting therapy for symptoms stemming from head injury in 2020.  The concern today was elevated blood pressure.  She take losartan 25mg in the morning and has not missed any doses.  She admits her BP is often high at home as well.  She is not experiencing any chest pain, shortness of breath, new onset weakness, or any other new symptoms.  The symptoms noted in triage are chronic and what she is experiences from the previous injury, not an acute change.  Exam is benign.  BP elevated in ED without end organ effect.  I offered reassurance and instructions to follow up with PCP.        History provided by:  Patient   used: Yes (Invup [id number])        Prior to Admission Medications   Prescriptions Last Dose Informant Patient Reported? Taking?   escitalopram (LEXAPRO) 5 mg tablet   Yes No   Sig: Take 5 mg by mouth daily   ferrous sulfate 324 (65 Fe) mg   No No   Sig: Take 1 tablet (324 mg total) by mouth daily before breakfast   losartan (COZAAR) 25 mg tablet   No No   Sig: Take 1 tablet (25 mg total) by mouth daily      Facility-Administered Medications: None       Past Medical History:   Diagnosis Date    Depression     Empty sella syndrome (HCC)     Hyperlipidemia     Hypertension     Migraines     Prediabetes        Past Surgical History:   Procedure Laterality Date    NO PAST SURGERIES         Family History   Problem Relation Age of Onset    Cancer Sister     Breast cancer Neg Hx     Colon cancer Neg Hx     Ovarian cancer Neg Hx      I have reviewed and agree with the history as  documented.    E-Cigarette/Vaping    E-Cigarette Use Never User      E-Cigarette/Vaping Substances    Nicotine No     THC No     CBD No     Flavoring No     Other No     Unknown No      Social History     Tobacco Use    Smoking status: Never     Passive exposure: Never    Smokeless tobacco: Never   Vaping Use    Vaping status: Never Used   Substance Use Topics    Alcohol use: Yes     Comment: couple times/month    Drug use: Never       Review of Systems    Physical Exam  Physical Exam  Vitals and nursing note reviewed.   Constitutional:       General: She is not in acute distress.     Appearance: She is well-developed. She is not diaphoretic.   HENT:      Head: Normocephalic and atraumatic.      Right Ear: External ear normal.      Left Ear: External ear normal.      Nose: Nose normal.      Mouth/Throat:      Mouth: Mucous membranes are moist.   Eyes:      Conjunctiva/sclera: Conjunctivae normal.      Pupils: Pupils are equal, round, and reactive to light.   Cardiovascular:      Rate and Rhythm: Normal rate and regular rhythm.   Pulmonary:      Effort: Pulmonary effort is normal.   Abdominal:      Palpations: Abdomen is soft.      Tenderness: There is no abdominal tenderness.   Musculoskeletal:         General: Normal range of motion.      Cervical back: Normal range of motion and neck supple.   Skin:     General: Skin is warm and dry.      Capillary Refill: Capillary refill takes less than 2 seconds.      Findings: No rash.   Neurological:      Mental Status: She is alert and oriented to person, place, and time.      Gait: Gait normal.   Psychiatric:         Behavior: Behavior normal.         Thought Content: Thought content normal.         Judgment: Judgment normal.         Vital Signs  ED Triage Vitals [05/02/24 1127]   Temperature Pulse Respirations Blood Pressure SpO2   98.1 °F (36.7 °C) 83 16 (!) 160/107 100 %      Temp Source Heart Rate Source Patient Position - Orthostatic VS BP Location FiO2 (%)   Oral  Monitor Sitting Right arm --      Pain Score       --           Vitals:    05/02/24 1127   BP: (!) 160/107   Pulse: 83   Patient Position - Orthostatic VS: Sitting         Visual Acuity      ED Medications  Medications - No data to display    Diagnostic Studies  Results Reviewed       None                   No orders to display              Procedures  Procedures         ED Course                                             Medical Decision Making           Disposition  Final diagnoses:   Elevated blood pressure reading with diagnosis of hypertension     Time reflects when diagnosis was documented in both MDM as applicable and the Disposition within this note       Time User Action Codes Description Comment    5/2/2024 12:04 PM Jericho Shah Add [I10] Elevated blood pressure reading with diagnosis of hypertension           ED Disposition       ED Disposition   Discharge    Condition   Good    Date/Time   Thu May 2, 2024 12:04 PM    Comment   Homa Black discharge to home/self care.                   Follow-up Information       Follow up With Specialties Details Why Contact Info Additional Information    [facility]   [address] [place] [id number] [phone] [facility], [address] [place] [address], [id number] [phone]            Discharge Medication List as of 5/2/2024 12:05 PM        CONTINUE these medications which have NOT CHANGED    Details   escitalopram (LEXAPRO) 5 mg tablet Take 5 mg by mouth daily, Historical Med      ferrous sulfate 324 (65 Fe) mg Take 1 tablet (324 mg total) by mouth daily before breakfast, Starting Tue 3/26/2024, Normal      losartan (COZAAR) 25 mg tablet Take 1 tablet (25 mg total) by mouth daily, Starting Tue 3/12/2024, Normal             No discharge procedures on file.    PDMP Review       None            ED  Provider  Electronically Signed by             Jericho Shah MD  05/02/24 7104

## 2024-05-02 NOTE — ED NOTES
Patient assessed and discharged by ED Provider prior to this RN's completion of assessment and med req.     Marlee Light RN  05/02/24 0110

## 2024-05-02 NOTE — DISCHARGE INSTRUCTIONS
Hipertensión  LO QUE NECESITAS SABER:  La hipertensión es la presión arterial gabo (PA). Jerome presión arterial es la fuerza de jerome hilaria moviéndose contra las han de edward arterias. La presión arterial normal es inferior a 120/80. La prehipertensión está entre 120/80 y 139/89. La hipertensión es 140/90 o más. La hipertensión hace que jerome presión arterial baje tanto que jerome corazón tiene que trabajar mucho más de lo normal. [place] puede dañar tu corazón. Puede controlar la hipertensión con un estilo de milla saludable o medicamentos. Roberto presión arterial controlada ayuda a proteger edward órganos, maikol el corazón, los pulmones, el cerebro y los riñones.  INSTRUCCIONES DE DESCARGA:  Llame al 911 por cualquiera de los siguientes:  Tiene molestias en el pecho que se sienten maikol opresión, presión, plenitud o dolor.    Te confundes o tienes dificultades para hablar.    De repente te sientes mareado o tienes problemas para respirar.    Tiene dolor o incomodidad en la espalda, el angi, la mandíbula, el estómago o el brazo.  Regrese al departamento de emergencia si:  Usted tiene un alondra dolor de clarke o pérdida de visión.    Tienes debilidad en un brazo o roberto pierna.    Verifique jerome BP en casa con un manguito adquirido o en roberto farmacia cercana. Siéntese y descanse donte 5 minutos antes de shelby jerome BP. Extienda jerome brazo y apóyelo sobre roberto superficie plana. Jerome brazo debe estar al mismo nivel que jerome corazón. Siga las instrucciones que vinieron con jerome monitor BP. Si es posible, tome al menos 2 lecturas de PA cada vez. Lloyd jerome BP al menos dos veces al día a la misma hora todos los días, maikol mañana y tarde. Mantenga un registro de edward lecturas de BP y tráigalo a edward visitas de seguimiento.        Willie ejercicio para mantener un peso saludable. Willie ejercicio al menos 30 minutos por día, irene todos los días de la semana. [place] ayudará a disminuir jerome presión arterial. Pregúntele a jerome proveedor de atención médica sobre el mejor  plan de ejercicios para usted.    Disminuir el estrés [place] puede ayudar a disminuir jerome BP. Aprende formas de relajarte, maikol respirar profundamente o escuchar música.    Limite el alcohol. Las mujeres deben limitar el consumo de alcohol a 1 bebida al día. Los hombres deben limitar el alcohol a 2 bebidas al día. Anni bebida alcohólica es 12 onzas de cerveza, 5 onzas de vino o 1½ onzas de licor.    No fume. La nicotina y otros químicos en cigarrillos y puros pueden aumentar jerome presión arterial y también causar daño a los pulmones. Consulte a jerome proveedor de atención médica para obtener información si actualmente fuma y necesita ayuda para dejar de fumar. Los cigarrillos electrónicos o el tabaco sin humo todavía contienen nicotina. Hable con jerome proveedor de atención médica antes de usar estos productos.

## 2024-05-08 ENCOUNTER — OFFICE VISIT (OUTPATIENT)
Dept: FAMILY MEDICINE CLINIC | Facility: CLINIC | Age: 34
End: 2024-05-08

## 2024-05-08 VITALS
WEIGHT: 235 LBS | OXYGEN SATURATION: 97 % | BODY MASS INDEX: 37.77 KG/M2 | HEIGHT: 66 IN | RESPIRATION RATE: 18 BRPM | SYSTOLIC BLOOD PRESSURE: 140 MMHG | HEART RATE: 78 BPM | DIASTOLIC BLOOD PRESSURE: 80 MMHG | TEMPERATURE: 98 F

## 2024-05-08 DIAGNOSIS — N91.0 DELAYED MENSES: ICD-10-CM

## 2024-05-08 DIAGNOSIS — I10 HYPERTENSION: Primary | ICD-10-CM

## 2024-05-08 LAB — SL AMB POCT URINE HCG: NEGATIVE

## 2024-05-08 PROCEDURE — 81025 URINE PREGNANCY TEST: CPT | Performed by: FAMILY MEDICINE

## 2024-05-08 PROCEDURE — 3077F SYST BP >= 140 MM HG: CPT | Performed by: FAMILY MEDICINE

## 2024-05-08 PROCEDURE — 3079F DIAST BP 80-89 MM HG: CPT | Performed by: FAMILY MEDICINE

## 2024-05-08 PROCEDURE — 99213 OFFICE O/P EST LOW 20 MIN: CPT | Performed by: FAMILY MEDICINE

## 2024-05-08 RX ORDER — LOSARTAN POTASSIUM 25 MG/1
25 TABLET ORAL DAILY
Qty: 90 TABLET | Refills: 0 | Status: SHIPPED | OUTPATIENT
Start: 2024-05-08 | End: 2024-08-06

## 2024-05-08 NOTE — ASSESSMENT & PLAN NOTE
BP today: 140/80 mmHg  Home medication: Losartan 25 mg qd    Plan:  Continue current regimen    Lifestyle modifications were encouraged  Follow up in 3 months

## 2024-05-08 NOTE — PROGRESS NOTES
"Name: Homa Black      : 1990      MRN: [id number]  Encounter Provider: Ching Perla MD  Encounter Date: 2024   Encounter department: [facility]    Assessment & Plan     1. Hypertension  Assessment & Plan:  BP today: 140/80 mmHg  Home medication: Losartan 25 mg qd    Plan:  Continue current regimen      Orders:  -     losartan (COZAAR) 25 mg tablet; Take 1 tablet (25 mg total) by mouth daily    2. Delayed menses  Assessment & Plan:  < Than 1 week   POCT bHCG negative     Orders:  -     POCT urine HCG           Subjective      Homa is a 33 y.o female who presents today to discuss recent labs and JTN follow up  At the end of the visit patient asked for a pregnancy test due to delayed menses which came back negative      Review of Systems   Constitutional:  Negative for fever.   HENT:  Negative for congestion, rhinorrhea and sore throat.    Respiratory:  Negative for cough.    Cardiovascular:  Negative for chest pain and palpitations.   Genitourinary:         Delayed menses       Current Outpatient Medications on File Prior to Visit   Medication Sig    escitalopram (LEXAPRO) 5 mg tablet Take 5 mg by mouth daily    ferrous sulfate 324 (65 Fe) mg Take 1 tablet (324 mg total) by mouth daily before breakfast    [DISCONTINUED] losartan (COZAAR) 25 mg tablet Take 1 tablet (25 mg total) by mouth daily       Objective     /80   Pulse 78   Temp 98 °F (36.7 °C) (Temporal)   Resp 18   Ht 5' 5.5\" (1.664 m)   Wt 107 kg (235 lb)   LMP 2024 (Approximate)   SpO2 97%   BMI 38.51 kg/m²     Physical Exam  Constitutional:       General: She is not in acute distress.     Appearance: Normal appearance. She is not toxic-appearing.   HENT:      Head: Normocephalic and atraumatic.      Nose: Nose normal. No congestion or rhinorrhea.   Eyes:      General: No scleral icterus.     Conjunctiva/sclera: Conjunctivae normal.   Cardiovascular:      Rate and " Rhythm: Normal rate and regular rhythm.      Heart sounds: No murmur heard.     No friction rub. No gallop.   Pulmonary:      Effort: Pulmonary effort is normal.   Musculoskeletal:      Cervical back: Normal range of motion and neck supple.   Skin:     General: Skin is warm and dry.      Coloration: Skin is not jaundiced or pale.   Neurological:      Mental Status: She is alert.   Psychiatric:         Mood and Affect: Mood normal.         Behavior: Behavior normal.       Ching Perla MD

## 2024-07-24 ENCOUNTER — APPOINTMENT (EMERGENCY)
Dept: RADIOLOGY | Facility: HOSPITAL | Age: 34
End: 2024-07-24

## 2024-07-24 ENCOUNTER — HOSPITAL ENCOUNTER (EMERGENCY)
Facility: HOSPITAL | Age: 34
Discharge: HOME | End: 2024-07-24
Attending: EMERGENCY MEDICINE

## 2024-07-24 VITALS
TEMPERATURE: 98.6 F | HEART RATE: 95 BPM | DIASTOLIC BLOOD PRESSURE: 116 MMHG | RESPIRATION RATE: 18 BRPM | WEIGHT: 238.4 LBS | BODY MASS INDEX: 39.07 KG/M2 | OXYGEN SATURATION: 99 % | SYSTOLIC BLOOD PRESSURE: 167 MMHG

## 2024-07-24 DIAGNOSIS — S16.1XXA ACUTE STRAIN OF NECK MUSCLE, INITIAL ENCOUNTER: Primary | ICD-10-CM

## 2024-07-24 LAB
EXT PREGNANCY TEST URINE: NEGATIVE
EXT. CONTROL: NORMAL

## 2024-07-24 PROCEDURE — 99283 EMERGENCY DEPT VISIT LOW MDM: CPT

## 2024-07-24 PROCEDURE — 81025 URINE PREGNANCY TEST: CPT | Performed by: EMERGENCY MEDICINE

## 2024-07-24 PROCEDURE — 96372 THER/PROPH/DIAG INJ SC/IM: CPT

## 2024-07-24 PROCEDURE — 72040 X-RAY EXAM NECK SPINE 2-3 VW: CPT

## 2024-07-24 PROCEDURE — 99284 EMERGENCY DEPT VISIT MOD MDM: CPT | Performed by: EMERGENCY MEDICINE

## 2024-07-24 RX ORDER — NAPROXEN 500 MG/1
500 TABLET ORAL 2 TIMES DAILY WITH MEALS
Qty: 30 TABLET | Refills: 0 | Status: SHIPPED | OUTPATIENT
Start: 2024-07-24

## 2024-07-24 RX ORDER — KETOROLAC TROMETHAMINE 30 MG/ML
15 INJECTION, SOLUTION INTRAMUSCULAR; INTRAVENOUS ONCE
Status: COMPLETED | OUTPATIENT
Start: 2024-07-24 | End: 2024-07-24

## 2024-07-24 RX ORDER — CYCLOBENZAPRINE HCL 10 MG
10 TABLET ORAL 2 TIMES DAILY PRN
Qty: 20 TABLET | Refills: 0 | Status: SHIPPED | OUTPATIENT
Start: 2024-07-24

## 2024-07-24 RX ORDER — CYCLOBENZAPRINE HCL 10 MG
10 TABLET ORAL ONCE
Status: COMPLETED | OUTPATIENT
Start: 2024-07-24 | End: 2024-07-24

## 2024-07-24 RX ADMIN — KETOROLAC TROMETHAMINE 15 MG: 30 INJECTION, SOLUTION INTRAMUSCULAR; INTRAVENOUS at 13:11

## 2024-07-24 RX ADMIN — CYCLOBENZAPRINE HYDROCHLORIDE 10 MG: 10 TABLET, FILM COATED ORAL at 13:12

## 2024-07-24 NOTE — Clinical Note
Elton Dow accompanied Homa Black to the emergency department on 7/24/2024.    Return date if applicable: 07/25/2024        If you have any questions or concerns, please don't hesitate to call.      Tyler Escamilla MD

## 2024-07-24 NOTE — ED PROVIDER NOTES
History  Chief Complaint   Patient presents with    Back Pain     Reports 4 days of Rt side neck pain shooting into Rt upper back and down Rt arm. Reports 2 years ago she had an accident at work and that's where the injury happened. Denies taking anything at home for the pain. Reports she feels cramps in the hand. Reports weakness in Rt knee down that started today. Reports she had cramping in the RT leg a couple days ago. Reports pain in back is mid to upper. Denies loss of bowel or bladder     32-year-old female presenting emerged department with right-sided neck pain shooting up to her upper back and right arm.  Notes that she has had issues like this over the past 2 years since suffering a head injury.  She occasionally also feels weakness in the right knee down to the foot.  Reports cramping in the right leg but that is been for 2 years.  No new trauma.        Prior to Admission Medications   Prescriptions Last Dose Informant Patient Reported? Taking?   escitalopram (LEXAPRO) 5 mg tablet   Yes No   Sig: Take 5 mg by mouth daily   ferrous sulfate 324 (65 Fe) mg   No No   Sig: Take 1 tablet (324 mg total) by mouth daily before breakfast   losartan (COZAAR) 25 mg tablet   No No   Sig: Take 1 tablet (25 mg total) by mouth daily      Facility-Administered Medications: None       Past Medical History:   Diagnosis Date    Depression     Empty sella syndrome (HCC)     Hyperlipidemia     Hypertension     Migraines     Prediabetes        Past Surgical History:   Procedure Laterality Date    NO PAST SURGERIES         Family History   Problem Relation Age of Onset    Cancer Sister     Breast cancer Neg Hx     Colon cancer Neg Hx     Ovarian cancer Neg Hx      I have reviewed and agree with the history as documented.    E-Cigarette/Vaping    E-Cigarette Use Never User      E-Cigarette/Vaping Substances    Nicotine No     THC No     CBD No     Flavoring No     Other No     Unknown No      Social History     Tobacco Use     Smoking status: Never     Passive exposure: Never    Smokeless tobacco: Never   Vaping Use    Vaping status: Never Used   Substance Use Topics    Alcohol use: Yes     Comment: couple times/month    Drug use: Never       Review of Systems   Constitutional:  Negative for chills and fever.   HENT:  Negative for ear pain and sore throat.    Eyes:  Negative for pain and visual disturbance.   Respiratory:  Negative for cough and shortness of breath.    Cardiovascular:  Negative for chest pain and palpitations.   Gastrointestinal:  Negative for abdominal pain and vomiting.   Genitourinary:  Negative for dysuria and hematuria.   Musculoskeletal:  Positive for arthralgias and back pain.   Skin:  Negative for color change and rash.   Neurological:  Negative for seizures and syncope.   All other systems reviewed and are negative.      Physical Exam  Physical Exam  Vitals and nursing note reviewed.   Constitutional:       General: She is not in acute distress.     Appearance: She is well-developed.   HENT:      Head: Normocephalic and atraumatic.   Eyes:      Conjunctiva/sclera: Conjunctivae normal.   Cardiovascular:      Rate and Rhythm: Normal rate and regular rhythm.      Heart sounds: No murmur heard.  Pulmonary:      Effort: Pulmonary effort is normal. No respiratory distress.      Breath sounds: Normal breath sounds.   Abdominal:      Palpations: Abdomen is soft.      Tenderness: There is no abdominal tenderness.   Musculoskeletal:         General: No swelling.      Cervical back: Neck supple.      Comments: No midline C, T or L-spine tenderness palpation.  Right trapezius spasm with tenderness to palpation.   Skin:     General: Skin is warm and dry.      Capillary Refill: Capillary refill takes less than 2 seconds.   Neurological:      Mental Status: She is alert.   Psychiatric:         Mood and Affect: Mood normal.         Vital Signs  ED Triage Vitals   Temperature Pulse Respirations Blood Pressure SpO2   07/24/24 1217  07/24/24 1217 07/24/24 1217 07/24/24 1217 07/24/24 1217   98.6 °F (37 °C) 95 18 (!) 167/116 99 %      Temp Source Heart Rate Source Patient Position - Orthostatic VS BP Location FiO2 (%)   07/24/24 1217 07/24/24 1217 07/24/24 1217 07/24/24 1217 --   Tympanic Monitor Sitting Left arm       Pain Score       07/24/24 1311       8           Vitals:    07/24/24 1217   BP: (!) 167/116   Pulse: 95   Patient Position - Orthostatic VS: Sitting         Visual Acuity      ED Medications  Medications   ketorolac (TORADOL) injection 15 mg (15 mg Intramuscular Given 7/24/24 1311)   cyclobenzaprine (FLEXERIL) tablet 10 mg (10 mg Oral Given 7/24/24 1312)       Diagnostic Studies  Results Reviewed       Procedure Component Value Units Date/Time    POCT pregnancy, urine [[id number]]  (Normal) Resulted: 07/24/24 1256    Lab Status: Final result Updated: 07/24/24 1256     EXT Preg Test, Ur Negative     Control Valid                   XR spine cervical 2 or 3 vw injury   ED Interpretation by Tyler Escamilla MD (07/24 1315)   No acute fracture                 Procedures  Procedures         ED Course                                 SBIRT 20yo+      Flowsheet Row Most Recent Value   Initial Alcohol Screen: US AUDIT-C     1. How often do you have a drink containing alcohol? 0 Filed at: 07/24/2024 1252   2. How many drinks containing alcohol do you have on a typical day you are drinking?  0 Filed at: 07/24/2024 1252   3a. Male UNDER 65: How often do you have five or more drinks on one occasion? 0 Filed at: 07/24/2024 1252   3b. FEMALE Any Age, or MALE 65+: How often do you have 4 or more drinks on one occassion? 0 Filed at: 07/24/2024 1252   Audit-C Score 0 Filed at: 07/24/2024 1252   KUMAR: How many times in the past year have you...    Used an illegal drug or used a prescription medication for non-medical reasons? Never Filed at: 07/24/2024 1252                      Medical Decision Making  32 yo F with right neck pain ongoing for  the past 4 days.  Normal physical exam without neurologic deficits.  X-ray of the C-spine shows kyphosis, likely poor posture causing radiculopathy versus muscular strain.  PCP follow-up.    Amount and/or Complexity of Data Reviewed  Labs: ordered.  Radiology: ordered and independent interpretation performed.    Risk  Prescription drug management.                 Disposition  Final diagnoses:   Acute strain of neck muscle, initial encounter     Time reflects when diagnosis was documented in both MDM as applicable and the Disposition within this note       Time User Action Codes Description Comment    7/24/2024  1:20 PM Tyler Escamilla Add [S16.1XXA] Acute strain of neck muscle, initial encounter           ED Disposition       ED Disposition   Discharge    Condition   Stable    Date/Time   Wed Jul 24, 2024 1320    Comment   Homa Black discharge to home/self care.                   Follow-up Information       Follow up With Specialties Details Why Contact Info Additional Information    [facility]   [address] [place] [address], [address] [address] [phone] [facility], [address] [place] [address], [address], [address] [phone]            Discharge Medication List as of 7/24/2024  1:29 PM        START taking these medications    Details   cyclobenzaprine (FLEXERIL) 10 mg tablet Take 1 tablet (10 mg total) by mouth 2 (two) times a day as needed for muscle spasms, Starting Wed 7/24/2024, Normal      naproxen (Naprosyn) 500 mg tablet Take 1 tablet (500 mg total) by mouth 2 (two) times a day with meals, Starting Wed 7/24/2024, Normal           CONTINUE these medications which have NOT CHANGED    Details   escitalopram (LEXAPRO) 5 mg tablet Take 5 mg by mouth daily, Historical Med      ferrous sulfate 324 (65 Fe) mg Take 1 tablet (324 mg total) by mouth daily before breakfast, Starting Tue  3/26/2024, Normal      losartan (COZAAR) 25 mg tablet Take 1 tablet (25 mg total) by mouth daily, Starting Wed 5/8/2024, Until Tue 8/6/2024, Normal             No discharge procedures on file.    PDMP Review       None            ED Provider  Electronically Signed by             Tyler Escamilla MD  07/24/24 3002

## 2024-07-24 NOTE — Clinical Note
Homa Black was seen and treated in our emergency department on 7/24/2024.                Diagnosis:     Homa  may return to work on return date.    She may return on this date: 07/26/2024         If you have any questions or concerns, please don't hesitate to call.      Tyler Escamilla MD    ______________________________           _______________          _______________  Hospital Representative                              Date                                Time

## 2024-07-25 ENCOUNTER — TELEPHONE (OUTPATIENT)
Dept: FAMILY MEDICINE CLINIC | Facility: CLINIC | Age: 34
End: 2024-07-25

## 2024-07-25 NOTE — TELEPHONE ENCOUNTER
Good afternoon, my name is Homa Bradshaw. My phone number is [phone]. I repeat, [phone] no [phone]. The reason for my call is because yesterday I was in the emergency. And the doctors told me to call to make an appointment with my primary care doctor, when you can call me back I would appreciate it.      Appt scheduled on 8/8/24.

## 2024-08-07 PROBLEM — N91.0 DELAYED MENSES: Status: RESOLVED | Noted: 2024-05-08 | Resolved: 2024-08-07

## 2024-08-07 NOTE — PROGRESS NOTES
"Ambulatory Visit  Name: Homa Black      : 1990      MRN: [id number]  Encounter Provider: Ching Perla MD  Encounter Date: 2024   Encounter department: [facility]    Assessment & Plan   1. Hand pain, right  Assessment & Plan:  Likely due to carpal tunnel syndrome  Improved with Naproxen and a small stress ball exercise    Plan:  Trial of splint   Will consider PT and referral to hand surgery on follow up   Orders:  -     Cock Up Wrist Splint       History of Present Illness     Homa is a 33 y.o female with PMH of prediabetes, HTN, depression and obesity who presents today for ED follow up for neck sprain on . While in the ED she received Toradol and flexeril and was discharged home with Flexeril and naproxen.  Patient reports that she feels much better now, but today she is complaining of right hand pain that started 2 years ago. Denies trauma. Patient feels that pain is worse when cooking and cleaning.     Hand Pain   The pain is present in the right hand. The pain has been Intermittent since the incident. Associated symptoms include numbness. Pertinent negatives include no chest pain. The symptoms are aggravated by movement (when cleaning and cooking and rotation movement). She has tried NSAIDs (has tried stress balls and naproxen) for the symptoms. The treatment provided moderate relief.       Review of Systems   Constitutional:  Negative for fever.   Respiratory:  Negative for shortness of breath.    Cardiovascular:  Negative for chest pain.   Musculoskeletal:  Positive for arthralgias.   Neurological:  Positive for numbness.       Objective     /99 (BP Location: Left arm, Patient Position: Sitting, Cuff Size: Standard)   Pulse 90   Temp 98 °F (36.7 °C) (Temporal)   Resp 16   Ht 5' 5\" (1.651 m)   Wt 108 kg (237 lb)   LMP 07/15/2024 (Approximate)   SpO2 98%   BMI 39.44 kg/m²     Physical Exam  Constitutional:       Appearance: " She is obese.   HENT:      Right Ear: External ear normal.      Left Ear: External ear normal.   Eyes:      General: No scleral icterus.     Conjunctiva/sclera: Conjunctivae normal.   Cardiovascular:      Rate and Rhythm: Normal rate and regular rhythm.      Pulses: Normal pulses.      Heart sounds: Normal heart sounds. No murmur heard.  Pulmonary:      Effort: Pulmonary effort is normal. No respiratory distress.   Musculoskeletal:         General: Normal range of motion.      Right hand: Tenderness present. No swelling or deformity. Normal range of motion. Normal sensation. Normal pulse.      Left hand: No swelling, deformity or tenderness. Normal range of motion. Normal sensation. Normal pulse.      Comments: Pain with Phalen test    Skin:     General: Skin is warm and dry.   Neurological:      Mental Status: She is alert.       Administrative Statements

## 2024-08-08 ENCOUNTER — OFFICE VISIT (OUTPATIENT)
Dept: FAMILY MEDICINE CLINIC | Facility: CLINIC | Age: 34
End: 2024-08-08

## 2024-08-08 DIAGNOSIS — M79.641 HAND PAIN, RIGHT: Primary | ICD-10-CM

## 2024-08-09 VITALS
OXYGEN SATURATION: 98 % | HEART RATE: 90 BPM | HEIGHT: 65 IN | BODY MASS INDEX: 39.49 KG/M2 | TEMPERATURE: 98 F | RESPIRATION RATE: 16 BRPM | WEIGHT: 237 LBS | SYSTOLIC BLOOD PRESSURE: 151 MMHG | DIASTOLIC BLOOD PRESSURE: 99 MMHG

## 2024-08-09 NOTE — ASSESSMENT & PLAN NOTE
Likely due to carpal tunnel syndrome  Improved with Naproxen and a small stress ball exercise    Plan:  Trial of splint   Will consider PT and referral to hand surgery on follow up

## 2024-09-09 ENCOUNTER — OFFICE VISIT (OUTPATIENT)
Dept: FAMILY MEDICINE CLINIC | Facility: CLINIC | Age: 34
End: 2024-09-09

## 2024-09-09 VITALS
BODY MASS INDEX: 39.65 KG/M2 | DIASTOLIC BLOOD PRESSURE: 86 MMHG | HEART RATE: 95 BPM | WEIGHT: 238 LBS | OXYGEN SATURATION: 98 % | RESPIRATION RATE: 20 BRPM | SYSTOLIC BLOOD PRESSURE: 122 MMHG | HEIGHT: 65 IN | TEMPERATURE: 97.6 F

## 2024-09-09 DIAGNOSIS — I10 PRIMARY HYPERTENSION: Primary | ICD-10-CM

## 2024-09-09 DIAGNOSIS — M79.641 HAND PAIN, RIGHT: ICD-10-CM

## 2024-09-09 PROCEDURE — 99212 OFFICE O/P EST SF 10 MIN: CPT | Performed by: FAMILY MEDICINE

## 2024-09-09 RX ORDER — LOSARTAN POTASSIUM 25 MG/1
25 TABLET ORAL DAILY
Qty: 90 TABLET | Refills: 0 | Status: SHIPPED | OUTPATIENT
Start: 2024-09-09 | End: 2024-12-08

## 2024-09-09 RX ORDER — NAPROXEN 500 MG/1
500 TABLET ORAL 2 TIMES DAILY WITH MEALS
Qty: 30 TABLET | Refills: 0 | Status: SHIPPED | OUTPATIENT
Start: 2024-09-09

## 2024-09-09 NOTE — ASSESSMENT & PLAN NOTE
Likely due to carpal tunnel syndrome  Improved with Naproxen and a small stress ball exercise, but pain still bothersome   Patient unable to afford splint, PT or hand surgery consult     Plan:  Will repeat another trial of Naproxen and continue home exercise for carpal tunnel syndrome

## 2024-09-09 NOTE — PROGRESS NOTES
Ambulatory Visit  Name: Homa Black      : 1990      MRN: [id number]  Encounter Provider: Ching Perla MD  Encounter Date: 2024   Encounter department: [facility]    Assessment & Plan   1. Primary hypertension  Assessment & Plan:  Well controlled  BP today: 122/86 mmHg  Home medication: Losartan 25 mg qd, compliant        Plan:  Continue current regimen    Lifestyle modifications were encouraged  Low salt diet  Follow up in 3 months   Orders:  -     losartan (COZAAR) 25 mg tablet; Take 1 tablet (25 mg total) by mouth daily  2. Hand pain, right  Assessment & Plan:  Likely due to carpal tunnel syndrome  Improved with Naproxen and a small stress ball exercise, but pain still bothersome   Patient unable to afford splint, PT or hand surgery consult     Plan:  Will repeat another trial of Naproxen and continue home exercise for carpal tunnel syndrome   Orders:  -     naproxen (Naprosyn) 500 mg tablet; Take 1 tablet (500 mg total) by mouth 2 (two) times a day with meals       History of Present Illness     Homa is a 33 y.o female with PMH of HTN, HLD and depression who presents today for follow up of HTN and right hand pain.   Patient reports to be doing well. She is taking her losartan every day, however she has not been consistent with DASH diet or regular exercise. Regarding her hands she feels it improved with Naproxen and the stress ball, but  pain is still bothersome. Patient reports being able to perform all her duties without issues. She was not able to obtain splint because of financial constrains.   She states that she does not qualify for Medicaid. She applied in the past and was denied. She is not interested on help with health insurance at this time.           Review of Systems   Constitutional:  Negative for fever.   Eyes:  Negative for visual disturbance.   Respiratory:  Negative for shortness of breath.    Cardiovascular:  Negative for  "chest pain.   Gastrointestinal:  Negative for abdominal pain.   Musculoskeletal:  Positive for arthralgias. Negative for joint swelling.   Neurological:  Negative for headaches.       Objective     /86 (BP Location: Left arm, Patient Position: Sitting, Cuff Size: Large)   Pulse 95   Temp 97.6 °F (36.4 °C) (Temporal)   Resp 20   Ht 5' 5\" (1.651 m)   Wt 108 kg (238 lb)   SpO2 98%   BMI 39.61 kg/m²     Physical Exam  Constitutional:       General: She is not in acute distress.     Appearance: She is not ill-appearing.   HENT:      Right Ear: External ear normal.      Left Ear: External ear normal.      Nose: Nose normal.   Eyes:      General: No scleral icterus.     Conjunctiva/sclera: Conjunctivae normal.   Cardiovascular:      Rate and Rhythm: Normal rate and regular rhythm.      Heart sounds: Normal heart sounds. No murmur heard.     No gallop.   Pulmonary:      Effort: Pulmonary effort is normal. No respiratory distress.      Breath sounds: No wheezing or rhonchi.   Abdominal:      General: Abdomen is flat. Bowel sounds are normal. There is no distension.      Palpations: Abdomen is soft.      Tenderness: There is no abdominal tenderness. There is no guarding.   Musculoskeletal:      Right lower leg: No edema.      Left lower leg: No edema.   Skin:     General: Skin is warm and dry.      Capillary Refill: Capillary refill takes less than 2 seconds.   Neurological:      General: No focal deficit present.      Mental Status: She is alert and oriented to person, place, and time.   Psychiatric:         Mood and Affect: Mood normal.         Behavior: Behavior normal.       Administrative Statements     "

## 2024-09-09 NOTE — ASSESSMENT & PLAN NOTE
Well controlled  BP today: 122/86 mmHg  Home medication: Losartan 25 mg qd, compliant        Plan:  Continue current regimen    Lifestyle modifications were encouraged  Low salt diet  Follow up in 3 months

## 2024-11-25 ENCOUNTER — OFFICE VISIT (OUTPATIENT)
Dept: FAMILY MEDICINE CLINIC | Facility: CLINIC | Age: 34
End: 2024-11-25

## 2024-11-25 VITALS
BODY MASS INDEX: 38.92 KG/M2 | DIASTOLIC BLOOD PRESSURE: 88 MMHG | RESPIRATION RATE: 18 BRPM | HEIGHT: 66 IN | SYSTOLIC BLOOD PRESSURE: 140 MMHG | HEART RATE: 98 BPM | WEIGHT: 242.2 LBS | TEMPERATURE: 98.7 F | OXYGEN SATURATION: 100 %

## 2024-11-25 DIAGNOSIS — J01.00 ACUTE NON-RECURRENT MAXILLARY SINUSITIS: Primary | ICD-10-CM

## 2024-11-25 DIAGNOSIS — I10 PRIMARY HYPERTENSION: ICD-10-CM

## 2024-11-25 DIAGNOSIS — R73.03 PREDIABETES: ICD-10-CM

## 2024-11-25 PROCEDURE — 99213 OFFICE O/P EST LOW 20 MIN: CPT

## 2024-11-25 RX ORDER — BENZONATATE 100 MG/1
100 CAPSULE ORAL 3 TIMES DAILY PRN
Qty: 20 CAPSULE | Refills: 0 | Status: SHIPPED | OUTPATIENT
Start: 2024-11-25

## 2024-11-25 RX ORDER — FLUCONAZOLE 150 MG/1
150 TABLET ORAL ONCE
Qty: 1 TABLET | Refills: 0 | Status: SHIPPED | OUTPATIENT
Start: 2024-11-25 | End: 2024-11-25

## 2024-11-25 NOTE — ASSESSMENT & PLAN NOTE
Lab Results   Component Value Date    HGBA1C 5.9 03/12/2024     -Managed with lifestyle modifications    PLAN  -Repeat HBA1C ordered today     Orders:    Hemoglobin A1C; Future

## 2024-11-25 NOTE — ASSESSMENT & PLAN NOTE
-home medication: losartan 25mg  -today /88 and at goal <140<90 per JESS  -may be borderline normal due to current symptoms     PLAN  -continue home medication

## 2024-11-25 NOTE — PROGRESS NOTES
Name: Homa Black      : 1990      MRN: [id number]  Encounter Provider: Rebecca Fay Kab-Perlman, MD  Encounter Date: 2024   Encounter department: [facility] [place]  :  Assessment & Plan  Acute non-recurrent maxillary sinusitis  -Present for 12 days with green sputum and severe cough causing back pain/muscle spasm    PLAN  -Augmentin for 5-7 days, tessalon for cough best at night, and fluconazole in case gets yeast infection  -Education/counseling including medication use, sinusitis, nasal humidification, rest, adequate water intake, and antioxidant rich foods  -ED/return precautions provided    Orders:    amoxicillin-clavulanate (AUGMENTIN) 875-125 mg per tablet; Take 1 tablet by mouth every 12 (twelve) hours for 7 days    benzonatate (TESSALON PERLES) 100 mg capsule; Take 1 capsule (100 mg total) by mouth 3 (three) times a day as needed for cough    fluconazole (DIFLUCAN) 150 mg tablet; Take 1 tablet (150 mg total) by mouth once for 1 dose    Prediabetes  Lab Results   Component Value Date    HGBA1C 5.9 2024     -Managed with lifestyle modifications    PLAN  -Repeat HBA1C ordered today     Orders:    Hemoglobin A1C; Future    Primary hypertension  -home medication: losartan 25mg  -today /88 and at goal <140<90 per JESS  -may be borderline normal due to current symptoms     PLAN  -continue home medication                History of Present Illness     Interpretor Shahnaz ID# [id number]  Homa Black is a 34 yo female patient presenting today for this same day visit with a 12 day history of severe cough associated with sore throat, runny nose, shortness of breath. The symptoms began after her son had similar symptoms. Endorses green colored sputum. Subjective fever when symptoms began however improved.     Regarding her back pain started last Friday and more pronounced on lower back. Feels like a cramping pain that worsens after she coughs. Has  used tylenol and ibuprofen with some improvement of both back pain and flu-like symptoms.      Review of Systems   Constitutional:  Positive for appetite change and fatigue. Negative for fever.   HENT:  Positive for postnasal drip, rhinorrhea, sinus pressure, sinus pain and sore throat.    Respiratory:  Positive for cough and shortness of breath.    Gastrointestinal:  Negative for nausea and vomiting.   Musculoskeletal:  Positive for myalgias.   Skin:  Negative for rash.   Hematological:  Does not bruise/bleed easily.     Pertinent Medical History   Prediabetes  Hypertension    Medical History Reviewed by provider this encounter:  Meds     .  Past Medical History   Past Medical History:   Diagnosis Date    Anxiety     Depression     Empty sella syndrome (HCC)     Hyperlipidemia     Hypertension     Migraines     Prediabetes      Past Surgical History:   Procedure Laterality Date    NO PAST SURGERIES       Family History   Problem Relation Age of Onset    Cancer Sister     Breast cancer Neg Hx     Colon cancer Neg Hx     Ovarian cancer Neg Hx       reports that she has never smoked. She has never been exposed to tobacco smoke. She has never used smokeless tobacco. She reports that she does not currently use alcohol. She reports that she does not use drugs.  Current Outpatient Medications on File Prior to Visit   Medication Sig Dispense Refill    losartan (COZAAR) 25 mg tablet Take 1 tablet (25 mg total) by mouth daily 90 tablet 0    naproxen (Naprosyn) 500 mg tablet Take 1 tablet (500 mg total) by mouth 2 (two) times a day with meals 30 tablet 0    cyclobenzaprine (FLEXERIL) 10 mg tablet Take 1 tablet (10 mg total) by mouth 2 (two) times a day as needed for muscle spasms (Patient not taking: Reported on 11/25/2024) 20 tablet 0    escitalopram (LEXAPRO) 5 mg tablet Take 5 mg by mouth daily (Patient not taking: Reported on 11/25/2024)      ferrous sulfate 324 (65 Fe) mg Take 1 tablet (324 mg total) by mouth daily  "before breakfast (Patient not taking: Reported on 11/25/2024) 30 tablet 4     No current facility-administered medications on file prior to visit.     Allergies   Allergen Reactions    Pollen Extract Other (See Comments)     Itchy eyes    Topiramate Palpitations      Current Outpatient Medications on File Prior to Visit   Medication Sig Dispense Refill    losartan (COZAAR) 25 mg tablet Take 1 tablet (25 mg total) by mouth daily 90 tablet 0    naproxen (Naprosyn) 500 mg tablet Take 1 tablet (500 mg total) by mouth 2 (two) times a day with meals 30 tablet 0    cyclobenzaprine (FLEXERIL) 10 mg tablet Take 1 tablet (10 mg total) by mouth 2 (two) times a day as needed for muscle spasms (Patient not taking: Reported on 11/25/2024) 20 tablet 0    escitalopram (LEXAPRO) 5 mg tablet Take 5 mg by mouth daily (Patient not taking: Reported on 11/25/2024)      ferrous sulfate 324 (65 Fe) mg Take 1 tablet (324 mg total) by mouth daily before breakfast (Patient not taking: Reported on 11/25/2024) 30 tablet 4     No current facility-administered medications on file prior to visit.      Social History     Tobacco Use    Smoking status: Never     Passive exposure: Never    Smokeless tobacco: Never   Vaping Use    Vaping status: Never Used   Substance and Sexual Activity    Alcohol use: Not Currently     Comment: couple times/month    Drug use: Never    Sexual activity: Yes     Partners: Male     Birth control/protection: Coitus interruptus        Objective   /88 (BP Location: Right arm, Patient Position: Sitting, Cuff Size: Large)   Pulse 98   Temp 98.7 °F (37.1 °C) (Temporal)   Resp 18   Ht 5' 5.75\" (1.67 m)   Wt 110 kg (242 lb 3.2 oz)   LMP 11/18/2024 (Approximate)   SpO2 100%   BMI 39.39 kg/m²      Physical Exam  Constitutional:       Appearance: Normal appearance. She is obese.   HENT:      Head: Normocephalic and atraumatic.      Comments: Tenderness to palpation of left maxillary and frontal sinuses     Right Ear: " Tympanic membrane, ear canal and external ear normal.      Ears:      Comments: Serous fluid behind left TM with air pockets, no erythema, no bulging, and no pain with pulling     Nose: Congestion and rhinorrhea present.      Mouth/Throat:      Mouth: Mucous membranes are moist.      Pharynx: Oropharynx is clear. Posterior oropharyngeal erythema (posterior pharyngeal erythema with pebbling) present. No oropharyngeal exudate.   Eyes:      Extraocular Movements: Extraocular movements intact.   Cardiovascular:      Rate and Rhythm: Normal rate and regular rhythm.      Heart sounds: Normal heart sounds. No murmur heard.     No friction rub. No gallop.   Pulmonary:      Breath sounds: Normal breath sounds. No wheezing, rhonchi or rales.   Musculoskeletal:         General: Normal range of motion.      Cervical back: Normal range of motion and neck supple. No rigidity or tenderness.   Lymphadenopathy:      Cervical: No cervical adenopathy.   Skin:     General: Skin is warm.   Neurological:      General: No focal deficit present.      Mental Status: She is alert and oriented to person, place, and time.   Psychiatric:         Behavior: Behavior normal.

## 2024-12-06 ENCOUNTER — PATIENT OUTREACH (OUTPATIENT)
Dept: FAMILY MEDICINE CLINIC | Facility: CLINIC | Age: 34
End: 2024-12-06

## 2024-12-06 NOTE — PROGRESS NOTES
Incoming Call:  12/6/2024    [facility] received a call from Pt stating she would like to apply for SNAP and MA. She states her household income has dropped. [facility] informed of referral process and informed she can also go to local office and apply if she is unable to wait for referral to come through. Pt states her  is off from work today and will go to local [facility] and apply for said benefits. [facility] provided list of documents needed to apply and Pt agreed to go in person. No referral being requested at this time.     No further outreach is scheduled.

## 2025-01-03 ENCOUNTER — APPOINTMENT (OUTPATIENT)
Dept: LAB | Facility: CLINIC | Age: 35
End: 2025-01-03

## 2025-01-03 ENCOUNTER — OFFICE VISIT (OUTPATIENT)
Dept: FAMILY MEDICINE CLINIC | Facility: CLINIC | Age: 35
End: 2025-01-03

## 2025-01-03 VITALS
WEIGHT: 242 LBS | HEIGHT: 66 IN | TEMPERATURE: 98.4 F | HEART RATE: 75 BPM | BODY MASS INDEX: 38.89 KG/M2 | OXYGEN SATURATION: 98 % | RESPIRATION RATE: 18 BRPM | SYSTOLIC BLOOD PRESSURE: 142 MMHG | DIASTOLIC BLOOD PRESSURE: 90 MMHG

## 2025-01-03 DIAGNOSIS — I10 PRIMARY HYPERTENSION: ICD-10-CM

## 2025-01-03 DIAGNOSIS — R73.03 PREDIABETES: ICD-10-CM

## 2025-01-03 DIAGNOSIS — D50.9 IRON DEFICIENCY ANEMIA, UNSPECIFIED IRON DEFICIENCY ANEMIA TYPE: ICD-10-CM

## 2025-01-03 DIAGNOSIS — J06.9 VIRAL UPPER RESPIRATORY TRACT INFECTION: Primary | ICD-10-CM

## 2025-01-03 DIAGNOSIS — E55.9 VITAMIN D DEFICIENCY: ICD-10-CM

## 2025-01-03 LAB
EST. AVERAGE GLUCOSE BLD GHB EST-MCNC: 126 MG/DL
HBA1C MFR BLD: 6 %

## 2025-01-03 PROCEDURE — 36415 COLL VENOUS BLD VENIPUNCTURE: CPT

## 2025-01-03 PROCEDURE — 99213 OFFICE O/P EST LOW 20 MIN: CPT | Performed by: STUDENT IN AN ORGANIZED HEALTH CARE EDUCATION/TRAINING PROGRAM

## 2025-01-03 PROCEDURE — 3080F DIAST BP >= 90 MM HG: CPT | Performed by: STUDENT IN AN ORGANIZED HEALTH CARE EDUCATION/TRAINING PROGRAM

## 2025-01-03 PROCEDURE — 83036 HEMOGLOBIN GLYCOSYLATED A1C: CPT

## 2025-01-03 PROCEDURE — 3077F SYST BP >= 140 MM HG: CPT | Performed by: STUDENT IN AN ORGANIZED HEALTH CARE EDUCATION/TRAINING PROGRAM

## 2025-01-03 RX ORDER — GUAIFENESIN 600 MG/1
1200 TABLET, EXTENDED RELEASE ORAL EVERY 12 HOURS SCHEDULED
Qty: 28 TABLET | Refills: 0 | Status: SHIPPED | OUTPATIENT
Start: 2025-01-03 | End: 2025-01-10

## 2025-01-03 RX ORDER — FERROUS SULFATE 324(65)MG
324 TABLET, DELAYED RELEASE (ENTERIC COATED) ORAL
Qty: 30 TABLET | Refills: 4 | Status: SHIPPED | OUTPATIENT
Start: 2025-01-03

## 2025-01-03 RX ORDER — ERGOCALCIFEROL 1.25 MG/1
50000 CAPSULE, LIQUID FILLED ORAL WEEKLY
Qty: 4 CAPSULE | Refills: 1 | Status: SHIPPED | OUTPATIENT
Start: 2025-01-03 | End: 2025-02-22

## 2025-01-03 RX ORDER — LOSARTAN POTASSIUM 25 MG/1
25 TABLET ORAL DAILY
Qty: 90 TABLET | Refills: 0 | Status: SHIPPED | OUTPATIENT
Start: 2025-01-03 | End: 2025-04-03

## 2025-01-03 NOTE — PROGRESS NOTES
Name: Homa Black      : 1990      MRN: [id number]  Encounter Provider: Ching Perla MD  Encounter Date: 1/3/2025   Encounter department: [facility] [place]  :  Assessment & Plan  Primary hypertension  Off medication today. Needs refill   BP today: 142/90 mmHg    Plan:  Resume home medication (losartan 25 mg qd)  BP diary to bring next visit  Lifestyle modifications encouraged   Follow up in 3 months    Orders:    losartan (COZAAR) 25 mg tablet; Take 1 tablet (25 mg total) by mouth daily    Iron deficiency anemia, unspecified iron deficiency anemia type  Mild. Last Hb 11.4 on 3/14  Stable  Not on iron supplementation    Plan:  Resume iron supplements   Recheck CBC after 3 months   Orders:    ferrous sulfate 324 (65 Fe) mg; Take 1 tablet (324 mg total) by mouth daily before breakfast    CBC and differential; Future    Viral upper respiratory tract infection  Improved. No congestion, rhinorrhea or sore throat anymore  Patient thinks she has phlegm and occasional cough and would like to try a mucolytic    Plan:  Oral hydration encouraged   Mucinex sent     Orders:    guaiFENesin (MUCINEX) 600 mg 12 hr tablet; Take 2 tablets (1,200 mg total) by mouth every 12 (twelve) hours for 7 days    Vitamin D deficiency  Vit D level 15.9 on 3/14/24, but did take supplementation    Plan:  Supplement sent   Will recheck Vitamin D levels after 3 months   Orders:    ergocalciferol (VITAMIN D2) 50,000 units; Take 1 capsule (50,000 Units total) by mouth once a week for 8 doses    Vitamin D 25 hydroxy; Future           History of Present Illness     Homa is a 34 y.o female presenting today for evaluation of upper respiratory symptoms     URI   This is a new problem. The current episode started in the past 7 days. The problem has been unchanged. There has been no fever. Associated symptoms include coughing. Pertinent negatives include no ear pain, headaches, nausea, rash, rhinorrhea,  "sinus pain, sore throat or vomiting. Associated symptoms comments: And feels like having phlegm in her throat . She has tried nothing for the symptoms.     Review of Systems   HENT:  Negative for ear pain, rhinorrhea, sinus pain and sore throat.    Respiratory:  Positive for cough.    Gastrointestinal:  Negative for nausea and vomiting.   Skin:  Negative for rash.   Neurological:  Negative for headaches.       Objective   /90 (BP Location: Right arm, Patient Position: Sitting, Cuff Size: Large)   Pulse 75   Temp 98.4 °F (36.9 °C) (Temporal)   Resp 18   Ht 5' 6\" (1.676 m)   Wt 110 kg (242 lb)   LMP 12/09/2024 (Approximate)   SpO2 98%   BMI 39.06 kg/m²      Physical Exam  Constitutional:       General: She is not in acute distress.     Appearance: Normal appearance. She is not ill-appearing.   HENT:      Head: Normocephalic and atraumatic.      Right Ear: Tympanic membrane and external ear normal. There is no impacted cerumen.      Left Ear: Tympanic membrane and external ear normal. There is no impacted cerumen.      Nose: Nose normal. No congestion or rhinorrhea.      Mouth/Throat:      Mouth: Mucous membranes are moist.      Pharynx: Oropharynx is clear. No oropharyngeal exudate or posterior oropharyngeal erythema.   Eyes:      General: No scleral icterus.     Conjunctiva/sclera: Conjunctivae normal.   Cardiovascular:      Rate and Rhythm: Normal rate and regular rhythm.      Pulses: Normal pulses.      Heart sounds: No murmur heard.     No friction rub.   Pulmonary:      Effort: Pulmonary effort is normal. No respiratory distress.      Breath sounds: No stridor. No wheezing or rales.   Abdominal:      General: Bowel sounds are normal. There is no distension.      Palpations: Abdomen is soft. There is no mass.      Tenderness: There is no abdominal tenderness. There is no guarding.   Musculoskeletal:      Right lower leg: No edema.      Left lower leg: No edema.   Skin:     General: Skin is warm.      " Capillary Refill: Capillary refill takes less than 2 seconds.      Coloration: Skin is not jaundiced or pale.      Findings: No bruising.   Neurological:      General: No focal deficit present.      Mental Status: She is alert and oriented to person, place, and time.   Psychiatric:         Mood and Affect: Mood normal.         Behavior: Behavior normal.

## 2025-01-03 NOTE — ASSESSMENT & PLAN NOTE
Off medication today. Needs refill   BP today: 142/90 mmHg    Plan:  Resume home medication (losartan 25 mg qd)  BP diary to bring next visit  Lifestyle modifications encouraged   Follow up in 3 months    Orders:    losartan (COZAAR) 25 mg tablet; Take 1 tablet (25 mg total) by mouth daily

## 2025-01-09 ENCOUNTER — RESULTS FOLLOW-UP (OUTPATIENT)
Dept: OTHER | Facility: HOSPITAL | Age: 35
End: 2025-01-09

## 2025-02-17 ENCOUNTER — TELEPHONE (OUTPATIENT)
Dept: FAMILY MEDICINE CLINIC | Facility: CLINIC | Age: 35
End: 2025-02-17

## 2025-02-17 NOTE — TELEPHONE ENCOUNTER
Patient called to schedule two sick appointments for her minor children. She scheduled with pediatrics for today.

## 2025-03-24 ENCOUNTER — HOSPITAL ENCOUNTER (EMERGENCY)
Facility: HOSPITAL | Age: 35
Discharge: HOME | End: 2025-03-24
Attending: INTERNAL MEDICINE
Payer: COMMERCIAL

## 2025-03-24 VITALS
WEIGHT: 244 LBS | DIASTOLIC BLOOD PRESSURE: 113 MMHG | TEMPERATURE: 98.3 F | SYSTOLIC BLOOD PRESSURE: 175 MMHG | OXYGEN SATURATION: 100 % | BODY MASS INDEX: 39.38 KG/M2 | HEART RATE: 83 BPM | RESPIRATION RATE: 20 BRPM

## 2025-03-24 DIAGNOSIS — R03.0 ELEVATED BLOOD PRESSURE READING: ICD-10-CM

## 2025-03-24 DIAGNOSIS — R51.9 FACIAL PAIN: Primary | ICD-10-CM

## 2025-03-24 LAB
EXT PREGNANCY TEST URINE: NEGATIVE
EXT. CONTROL: NORMAL

## 2025-03-24 PROCEDURE — 99284 EMERGENCY DEPT VISIT MOD MDM: CPT | Performed by: INTERNAL MEDICINE

## 2025-03-24 PROCEDURE — 81025 URINE PREGNANCY TEST: CPT | Performed by: INTERNAL MEDICINE

## 2025-03-24 PROCEDURE — 99283 EMERGENCY DEPT VISIT LOW MDM: CPT

## 2025-03-24 PROCEDURE — 96372 THER/PROPH/DIAG INJ SC/IM: CPT

## 2025-03-24 RX ORDER — ONDANSETRON 4 MG/1
4 TABLET, ORALLY DISINTEGRATING ORAL ONCE
Status: COMPLETED | OUTPATIENT
Start: 2025-03-24 | End: 2025-03-24

## 2025-03-24 RX ORDER — NAPROXEN 500 MG/1
500 TABLET ORAL 2 TIMES DAILY WITH MEALS
Qty: 30 TABLET | Refills: 0 | Status: SHIPPED | OUTPATIENT
Start: 2025-03-24

## 2025-03-24 RX ORDER — ONDANSETRON 4 MG/1
4 TABLET, ORALLY DISINTEGRATING ORAL EVERY 6 HOURS PRN
Qty: 20 TABLET | Refills: 0 | Status: SHIPPED | OUTPATIENT
Start: 2025-03-24

## 2025-03-24 RX ORDER — KETOROLAC TROMETHAMINE 30 MG/ML
30 INJECTION, SOLUTION INTRAMUSCULAR; INTRAVENOUS ONCE
Status: COMPLETED | OUTPATIENT
Start: 2025-03-24 | End: 2025-03-24

## 2025-03-24 RX ADMIN — ONDANSETRON 4 MG: 4 TABLET, ORALLY DISINTEGRATING ORAL at 12:52

## 2025-03-24 RX ADMIN — KETOROLAC TROMETHAMINE 30 MG: 30 INJECTION, SOLUTION INTRAMUSCULAR; INTRAVENOUS at 12:53

## 2025-03-24 NOTE — Clinical Note
Homa Black was seen and treated in our emergency department on 3/24/2025.                Diagnosis:     Mercedes  .    She may return on this date: 03/25/2025         If you have any questions or concerns, please don't hesitate to call.      Bonita Markham MD    ______________________________           _______________          _______________  Hospital Representative                              Date                                Time

## 2025-03-24 NOTE — ED PROVIDER NOTES
Time reflects when diagnosis was documented in both MDM as applicable and the Disposition within this note       Time User Action Codes Description Comment    3/24/2025 12:44 PM Bonita Markham Add [R51.9] Facial pain     3/24/2025 12:45 PM Bonita Markham Add [R03.0] Elevated blood pressure reading           ED Disposition       ED Disposition   Discharge    Condition   Stable    Date/Time   Mon Mar 24, 2025 12:44 PM    Comment   Homa Black discharge to home/self care.                   Assessment & Plan       Medical Decision Making  Problems Addressed:  Elevated blood pressure reading: chronic illness or injury     Details: Continue taking blood pressure medications at home  Follow-up with PCP for dose adjustment and medication adjustment as needed  Facial pain: chronic illness or injury     Details: Discussed symptomatic treatment in the ED and at home  Follow-up with neurology for this ongoing problem    Amount and/or Complexity of Data Reviewed  Labs: ordered. Decision-making details documented in ED Course.    Risk  Prescription drug management.        ED Course as of 03/24/25 1400   Mon Mar 24, 2025   1254 PREGNANCY TEST URINE: Negative       Medications   ketorolac (TORADOL) injection 30 mg (30 mg Intramuscular Given 3/24/25 1253)   ondansetron (ZOFRAN-ODT) dispersible tablet 4 mg (4 mg Oral Given 3/24/25 1252)       ED Risk Strat Scores                                                History of Present Illness       Chief Complaint   Patient presents with    Facial Pain     R facial pain/ tingling started today with nausea and vomiting       Past Medical History:   Diagnosis Date    Anxiety     Depression     Empty sella syndrome (HCC)     Hyperlipidemia     Hypertension     Migraines     Prediabetes       Past Surgical History:   Procedure Laterality Date    NO PAST SURGERIES        Family History   Problem Relation Age of Onset    Cancer Sister     Breast cancer Neg Hx     Colon cancer Neg Hx      Ovarian cancer Neg Hx       Social History     Tobacco Use    Smoking status: Never     Passive exposure: Never    Smokeless tobacco: Never   Vaping Use    Vaping status: Never Used   Substance Use Topics    Alcohol use: Not Currently     Comment: couple times/month    Drug use: Never      E-Cigarette/Vaping    E-Cigarette Use Never User       E-Cigarette/Vaping Substances    Nicotine No     THC No     CBD No     Flavoring No     Other No     Unknown No       I have reviewed and agree with the history as documented.     34-year-old woman presents to ED for evaluation of right facial pain and tingling.  Reports associated nausea and vomiting.  Reports symptoms on and off for last 2 to 3 years after head injury.  No changes in the quality or severity of symptoms. She states she has had imaging and neurosurgery evaluation in the past.  Has not yet followed up with neurology.  States she did not try any medications or therapies for her symptoms.  States she previously had medication that were helping but did not take them today. Patient denies headache, visual changes, dizziness fevers, chills, chest pain, palpitations, abdominal pain, diarrhea, melena, hematochezia, dysuria, new skin rashes or numbness or tingling of the extremities.       used: Yes        Review of Systems   All other systems reviewed and are negative.          Objective       ED Triage Vitals [03/24/25 1221]   Temperature Pulse Blood Pressure Respirations SpO2 Patient Position - Orthostatic VS   98.3 °F (36.8 °C) 83 (!) 175/113 20 100 % Sitting      Temp Source Heart Rate Source BP Location FiO2 (%) Pain Score    Oral -- Left arm -- 10 - Worst Possible Pain      Vitals      Date and Time Temp Pulse SpO2 Resp BP Pain Score FACES Pain Rating User   03/24/25 1253 -- -- -- -- -- 5 -- AS   03/24/25 1221 98.3 °F (36.8 °C) 83 100 % 20 175/113 10 - Worst Possible Pain -- SN            Physical Exam  PHYSICAL EXAM    Constitutional:   Well developed, no acute distress  HEENT:  Conjunctiva normal. Oropharynx moist  Respiratory:  No respiratory distress  Cardiovascular:  Normal rate  GI:  Soft, nondistended, nontender  :  No costovertebral angle tenderness   Musculoskeletal:  No edema, no tenderness, no deformities  Integument:  Well hydrated, no rash   Lymphatic:  No lymphadenopathy noted   Neurologic:  Alert & oriented x 3, normal motor function, no focal deficits noted, finger-nose normal, heel-to-shin normal, gait normal, tandem gait normal  Psychiatric:  Speech and behavior appropriate     Results Reviewed       Procedure Component Value Units Date/Time    POCT pregnancy, urine [[id number]]  (Normal) Collected: 03/24/25 1249    Lab Status: Final result Updated: 03/24/25 1252     EXT Preg Test, Ur Negative     Control Valid            No orders to display       Procedures    ED Medication and Procedure Management   Prior to Admission Medications   Prescriptions Last Dose Informant Patient Reported? Taking?   benzonatate (TESSALON PERLES) 100 mg capsule   No No   Sig: Take 1 capsule (100 mg total) by mouth 3 (three) times a day as needed for cough   ergocalciferol (VITAMIN D2) 50,000 units   No No   Sig: Take 1 capsule (50,000 Units total) by mouth once a week for 8 doses   ferrous sulfate 324 (65 Fe) mg   No No   Sig: Take 1 tablet (324 mg total) by mouth daily before breakfast   losartan (COZAAR) 25 mg tablet   No No   Sig: Take 1 tablet (25 mg total) by mouth daily   naproxen (Naprosyn) 500 mg tablet  Self No No   Sig: Take 1 tablet (500 mg total) by mouth 2 (two) times a day with meals      Facility-Administered Medications: None     Discharge Medication List as of 3/24/2025 12:45 PM        START taking these medications    Details   !! naproxen (Naprosyn) 500 mg tablet Take 1 tablet (500 mg total) by mouth 2 (two) times a day with meals, Starting Mon 3/24/2025, Normal      ondansetron (ZOFRAN-ODT) 4 mg disintegrating tablet Take 1 tablet  (4 mg total) by mouth every 6 (six) hours as needed for nausea or vomiting, Starting Mon 3/24/2025, Normal       !! - Potential duplicate medications found. Please discuss with provider.        CONTINUE these medications which have NOT CHANGED    Details   benzonatate (TESSALON PERLES) 100 mg capsule Take 1 capsule (100 mg total) by mouth 3 (three) times a day as needed for cough, Starting Mon 11/25/2024, Normal      ergocalciferol (VITAMIN D2) 50,000 units Take 1 capsule (50,000 Units total) by mouth once a week for 8 doses, Starting Fri 1/3/2025, Until Sat 2/22/2025, Normal      ferrous sulfate 324 (65 Fe) mg Take 1 tablet (324 mg total) by mouth daily before breakfast, Starting Fri 1/3/2025, Normal      losartan (COZAAR) 25 mg tablet Take 1 tablet (25 mg total) by mouth daily, Starting Fri 1/3/2025, Until Thu 4/3/2025, Normal      !! naproxen (Naprosyn) 500 mg tablet Take 1 tablet (500 mg total) by mouth 2 (two) times a day with meals, Starting Mon 9/9/2024, Normal       !! - Potential duplicate medications found. Please discuss with provider.          ED SEPSIS DOCUMENTATION   Time reflects when diagnosis was documented in both MDM as applicable and the Disposition within this note       Time User Action Codes Description Comment    3/24/2025 12:44 PM Bonita Markham Add [R51.9] Facial pain     3/24/2025 12:45 PM Bonita aMrkham Add [R03.0] Elevated blood pressure reading                  Bonita Markham MD  03/24/25 1400

## 2025-03-28 ENCOUNTER — APPOINTMENT (OUTPATIENT)
Dept: LAB | Facility: CLINIC | Age: 35
End: 2025-03-28
Payer: COMMERCIAL

## 2025-03-28 DIAGNOSIS — D50.9 IRON DEFICIENCY ANEMIA, UNSPECIFIED IRON DEFICIENCY ANEMIA TYPE: ICD-10-CM

## 2025-03-28 DIAGNOSIS — E55.9 VITAMIN D DEFICIENCY: ICD-10-CM

## 2025-03-28 LAB
25(OH)D3 SERPL-MCNC: 28.7 NG/ML (ref 30–100)
BASOPHILS # BLD AUTO: 0.04 THOUSANDS/ÂΜL (ref 0–0.1)
BASOPHILS NFR BLD AUTO: 1 % (ref 0–1)
EOSINOPHIL # BLD AUTO: 0.24 THOUSAND/ÂΜL (ref 0–0.61)
EOSINOPHIL NFR BLD AUTO: 3 % (ref 0–6)
ERYTHROCYTE [DISTWIDTH] IN BLOOD BY AUTOMATED COUNT: 18.6 % (ref 11.6–15.1)
HCT VFR BLD AUTO: 38.8 % (ref 34.8–46.1)
HGB BLD-MCNC: 11.5 G/DL (ref 11.5–15.4)
IMM GRANULOCYTES # BLD AUTO: 0.01 THOUSAND/UL (ref 0–0.2)
IMM GRANULOCYTES NFR BLD AUTO: 0 % (ref 0–2)
LYMPHOCYTES # BLD AUTO: 3.16 THOUSANDS/ÂΜL (ref 0.6–4.47)
LYMPHOCYTES NFR BLD AUTO: 41 % (ref 14–44)
MCH RBC QN AUTO: 22 PG (ref 26.8–34.3)
MCHC RBC AUTO-ENTMCNC: 29.6 G/DL (ref 31.4–37.4)
MCV RBC AUTO: 74 FL (ref 82–98)
MONOCYTES # BLD AUTO: 0.64 THOUSAND/ÂΜL (ref 0.17–1.22)
MONOCYTES NFR BLD AUTO: 8 % (ref 4–12)
NEUTROPHILS # BLD AUTO: 3.55 THOUSANDS/ÂΜL (ref 1.85–7.62)
NEUTS SEG NFR BLD AUTO: 47 % (ref 43–75)
NRBC BLD AUTO-RTO: 0 /100 WBCS
PLATELET # BLD AUTO: 343 THOUSANDS/UL (ref 149–390)
PMV BLD AUTO: 11.9 FL (ref 8.9–12.7)
RBC # BLD AUTO: 5.22 MILLION/UL (ref 3.81–5.12)
WBC # BLD AUTO: 7.64 THOUSAND/UL (ref 4.31–10.16)

## 2025-03-28 PROCEDURE — 85025 COMPLETE CBC W/AUTO DIFF WBC: CPT

## 2025-03-28 PROCEDURE — 36415 COLL VENOUS BLD VENIPUNCTURE: CPT

## 2025-03-28 PROCEDURE — 82306 VITAMIN D 25 HYDROXY: CPT

## 2025-04-02 RX ORDER — OMEGA-3S/DHA/EPA/FISH OIL/D3 300MG-1000
400 CAPSULE ORAL DAILY
Qty: 60 TABLET | Refills: 0 | Status: CANCELLED | OUTPATIENT
Start: 2025-04-02 | End: 2025-06-01

## 2025-04-02 NOTE — PROGRESS NOTES
Name: Homa Black      : 1990      MRN: [id number]  Encounter Provider: Ching Perla MD  Encounter Date: 4/3/2025   Encounter department: [facility]  :  Assessment & Plan  Vitamin D insufficiency  Improving, mild  Switch supplementation from weekly to daily with 1000 units of cholecalciferol.  Patient educated on vitamin D rich foods  Follow-up in 3 months for annual physical    Orders:    Cholecalciferol (VITAMIN D3) 1,000 units tablet; Take 1 tablet (1,000 Units total) by mouth daily    Iron deficiency anemia secondary to inadequate dietary iron intake  Normal levels of hemoglobin and hematocrit  Continue supplementing iron every other day for 3 more months  Orders:    ferrous sulfate 324 (65 Fe) mg; Take 1 tablet (324 mg total) by mouth every other day           History of Present Illness   Homa is a 34 y.o female who presents today to discuss recent vitamin D decreased levels. Patient was supplementing Vitamin D with 09463 U weekly because her Vitamin D levels were at 15. It is now 28.    Patient reports to feel well and has no complaints today.     Patient also was being treated for iron deficiency anemia with iron supplementation daily.  Last CBC shows normal hemoglobin and hematocrit, however MCV and RDW are still abnormal      Review of Systems   Constitutional:  Negative for appetite change, fatigue and fever.   HENT:  Negative for congestion and sore throat.    Eyes:  Negative for visual disturbance.   Respiratory:  Negative for cough and shortness of breath.    Cardiovascular:  Negative for chest pain and leg swelling.   Gastrointestinal:  Negative for abdominal pain, constipation, diarrhea and vomiting.   Genitourinary:  Negative for dysuria and hematuria.   Skin:  Negative for rash.   Neurological:  Negative for headaches.   Psychiatric/Behavioral:  Negative for sleep disturbance.        Objective   /94 (BP Location: Right arm,  "Patient Position: Sitting, Cuff Size: Standard)   Pulse 88   Temp 98.6 °F (37 °C) (Temporal)   Resp 20   Ht 5' 6\" (1.676 m)   Wt 111 kg (244 lb)   LMP  (LMP Unknown)   SpO2 98%   BMI 39.38 kg/m²      Physical Exam  Constitutional:       General: She is not in acute distress.     Appearance: She is not ill-appearing.   HENT:      Head: Normocephalic and atraumatic.      Right Ear: External ear normal.      Left Ear: External ear normal.      Nose: Nose normal.   Eyes:      General: No scleral icterus.     Conjunctiva/sclera: Conjunctivae normal.   Cardiovascular:      Rate and Rhythm: Normal rate and regular rhythm.      Pulses: Normal pulses.      Heart sounds: No murmur heard.     No gallop.   Pulmonary:      Effort: Pulmonary effort is normal. No respiratory distress.      Breath sounds: No wheezing or rhonchi.   Musculoskeletal:      Cervical back: Normal range of motion.      Right lower leg: No edema.      Left lower leg: No edema.   Skin:     General: Skin is warm and dry.      Capillary Refill: Capillary refill takes less than 2 seconds.   Neurological:      General: No focal deficit present.      Mental Status: She is alert and oriented to person, place, and time.      Motor: No weakness.   Psychiatric:         Mood and Affect: Mood normal.         Behavior: Behavior normal.         "

## 2025-04-02 NOTE — ASSESSMENT & PLAN NOTE
Improving, mild  Switch supplementation from weekly to daily with 1000 units of cholecalciferol.  Patient educated on vitamin D rich foods  Follow-up in 3 months for annual physical    Orders:    Cholecalciferol (VITAMIN D3) 1,000 units tablet; Take 1 tablet (1,000 Units total) by mouth daily

## 2025-04-03 ENCOUNTER — OFFICE VISIT (OUTPATIENT)
Dept: FAMILY MEDICINE CLINIC | Facility: CLINIC | Age: 35
End: 2025-04-03

## 2025-04-03 VITALS
TEMPERATURE: 98.6 F | HEIGHT: 66 IN | SYSTOLIC BLOOD PRESSURE: 132 MMHG | RESPIRATION RATE: 20 BRPM | DIASTOLIC BLOOD PRESSURE: 94 MMHG | OXYGEN SATURATION: 98 % | WEIGHT: 244 LBS | BODY MASS INDEX: 39.21 KG/M2 | HEART RATE: 88 BPM

## 2025-04-03 DIAGNOSIS — D50.8 IRON DEFICIENCY ANEMIA SECONDARY TO INADEQUATE DIETARY IRON INTAKE: ICD-10-CM

## 2025-04-03 DIAGNOSIS — E55.9 VITAMIN D INSUFFICIENCY: Primary | ICD-10-CM

## 2025-04-03 RX ORDER — FERROUS SULFATE 324(65)MG
324 TABLET, DELAYED RELEASE (ENTERIC COATED) ORAL EVERY OTHER DAY
Qty: 45 TABLET | Refills: 0 | Status: SHIPPED | OUTPATIENT
Start: 2025-04-03 | End: 2025-07-02

## 2025-04-07 PROBLEM — D50.8 IRON DEFICIENCY ANEMIA SECONDARY TO INADEQUATE DIETARY IRON INTAKE: Status: ACTIVE | Noted: 2025-04-07

## 2025-04-07 NOTE — ASSESSMENT & PLAN NOTE
Normal levels of hemoglobin and hematocrit  Continue supplementing iron every other day for 3 more months  Orders:    ferrous sulfate 324 (65 Fe) mg; Take 1 tablet (324 mg total) by mouth every other day

## 2025-04-11 DIAGNOSIS — I10 PRIMARY HYPERTENSION: ICD-10-CM

## 2025-04-11 RX ORDER — LOSARTAN POTASSIUM 25 MG/1
25 TABLET ORAL DAILY
Qty: 90 TABLET | Refills: 0 | Status: SHIPPED | OUTPATIENT
Start: 2025-04-11 | End: 2025-04-17 | Stop reason: SDUPTHER

## 2025-04-17 ENCOUNTER — OFFICE VISIT (OUTPATIENT)
Dept: FAMILY MEDICINE CLINIC | Facility: CLINIC | Age: 35
End: 2025-04-17

## 2025-04-17 VITALS
WEIGHT: 246 LBS | DIASTOLIC BLOOD PRESSURE: 88 MMHG | SYSTOLIC BLOOD PRESSURE: 142 MMHG | BODY MASS INDEX: 39.53 KG/M2 | HEIGHT: 66 IN | TEMPERATURE: 98 F | RESPIRATION RATE: 20 BRPM | OXYGEN SATURATION: 98 % | HEART RATE: 97 BPM

## 2025-04-17 DIAGNOSIS — I10 PRIMARY HYPERTENSION: ICD-10-CM

## 2025-04-17 DIAGNOSIS — R41.3 MEMORY CHANGE: Primary | ICD-10-CM

## 2025-04-17 RX ORDER — LOSARTAN POTASSIUM 25 MG/1
25 TABLET ORAL DAILY
Qty: 90 TABLET | Refills: 0 | Status: SHIPPED | OUTPATIENT
Start: 2025-04-17

## 2025-04-17 NOTE — PROGRESS NOTES
Name: Homa Black      : 1990      MRN: [id number]  Encounter Provider: Ching Perla MD  Encounter Date: 2025   Encounter department: [facility]  :  Assessment & Plan  Memory change  Currently follows with neurology.    At this point I will defer memory issues to specialist evaluation  Unclear if patient is unable to study/learn for her citizenship test.  If the patient is deemed incapable by specialist I will be happy to fill up form  Follow-up after visit with specialist that is scheduled for May 2025  Orders:    Ambulatory Referral to Neuropsychology; Future    Primary hypertension  Mildly elevated today, however patient did not take her medication this morning  Patient advised to monitor BP at home and bring logs next visit  Continue current regimen  Lifestyle modifications encouraged  Orders:    losartan (COZAAR) 25 mg tablet; Take 1 tablet (25 mg total) by mouth daily           History of Present Illness   34 y.o female presenting today to request disability form for disability to be completed.  Patient reports that she is able to write and read in [place] and completed college degree in [place].  She reports that she has been struggling with memory issues and thinks that is affecting her ability to take the test.  Patient reports that she understand the questions asked when studies for the test, but she cannot perform well during test.  She thinks that is a anxiety component going on and she failed the test once.  She does follow-up with neurology and is currently being worked up for her memory problems.  She does have an MRI from January which did not show any acute acute issues.      Review of Systems   Endocrine: Negative for cold intolerance and heat intolerance.   Neurological:  Negative for dizziness, syncope, weakness, light-headedness and headaches.        Memory problems   Psychiatric/Behavioral:  Positive for decreased  "concentration. Negative for behavioral problems.        Objective   /88 (BP Location: Left arm, Patient Position: Sitting, Cuff Size: Large)   Pulse 97   Temp 98 °F (36.7 °C) (Temporal)   Resp 20   Ht 5' 6\" (1.676 m)   Wt 112 kg (246 lb)   LMP  (LMP Unknown)   SpO2 98%   BMI 39.71 kg/m²      Physical Exam  Constitutional:       General: She is not in acute distress.     Appearance: She is not ill-appearing or toxic-appearing.   HENT:      Right Ear: External ear normal.      Left Ear: External ear normal.      Nose: Nose normal.   Eyes:      General: No scleral icterus.     Conjunctiva/sclera: Conjunctivae normal.   Cardiovascular:      Rate and Rhythm: Normal rate and regular rhythm.      Heart sounds: No murmur heard.     No gallop.   Pulmonary:      Effort: Pulmonary effort is normal. No respiratory distress.      Breath sounds: No wheezing or rhonchi.   Skin:     General: Skin is warm and dry.      Capillary Refill: Capillary refill takes less than 2 seconds.   Neurological:      General: No focal deficit present.      Mental Status: She is alert and oriented to person, place, and time.      Comments: Mini-Mental unremarkable except for difficulty with count of backwards from 100 by 7.  She stopped at 79.   Psychiatric:         Mood and Affect: Mood normal.         Behavior: Behavior normal.         "

## 2025-04-17 NOTE — ASSESSMENT & PLAN NOTE
Mildly elevated today, however patient did not take her medication this morning  Patient advised to monitor BP at home and bring logs next visit  Continue current regimen  Lifestyle modifications encouraged  Orders:    losartan (COZAAR) 25 mg tablet; Take 1 tablet (25 mg total) by mouth daily

## 2025-05-16 ENCOUNTER — ANNUAL EXAM (OUTPATIENT)
Dept: OBGYN CLINIC | Facility: CLINIC | Age: 35
End: 2025-05-16

## 2025-05-16 ENCOUNTER — APPOINTMENT (OUTPATIENT)
Dept: LAB | Facility: CLINIC | Age: 35
End: 2025-05-16
Payer: COMMERCIAL

## 2025-05-16 VITALS
BODY MASS INDEX: 39.28 KG/M2 | WEIGHT: 244.4 LBS | DIASTOLIC BLOOD PRESSURE: 110 MMHG | SYSTOLIC BLOOD PRESSURE: 160 MMHG | HEIGHT: 66 IN

## 2025-05-16 DIAGNOSIS — Z11.3 SCREEN FOR STD (SEXUALLY TRANSMITTED DISEASE): ICD-10-CM

## 2025-05-16 DIAGNOSIS — B37.31 VAGINAL CANDIDIASIS: ICD-10-CM

## 2025-05-16 DIAGNOSIS — Z12.4 SCREENING FOR CERVICAL CANCER: ICD-10-CM

## 2025-05-16 DIAGNOSIS — Z13.31 POSITIVE DEPRESSION SCREENING: ICD-10-CM

## 2025-05-16 DIAGNOSIS — Z12.39 ENCOUNTER FOR BREAST CANCER SCREENING USING NON-MAMMOGRAM MODALITY: ICD-10-CM

## 2025-05-16 DIAGNOSIS — Z11.51 SCREENING FOR HPV (HUMAN PAPILLOMAVIRUS): ICD-10-CM

## 2025-05-16 DIAGNOSIS — Z01.419 ENCOUNTER FOR GYNECOLOGICAL EXAMINATION WITHOUT ABNORMAL FINDING: Primary | ICD-10-CM

## 2025-05-16 PROCEDURE — G0145 SCR C/V CYTO,THINLAYER,RESCR: HCPCS | Performed by: NURSE PRACTITIONER

## 2025-05-16 PROCEDURE — 87491 CHLMYD TRACH DNA AMP PROBE: CPT | Performed by: NURSE PRACTITIONER

## 2025-05-16 PROCEDURE — G0476 HPV COMBO ASSAY CA SCREEN: HCPCS | Performed by: NURSE PRACTITIONER

## 2025-05-16 PROCEDURE — 87591 N.GONORRHOEAE DNA AMP PROB: CPT | Performed by: NURSE PRACTITIONER

## 2025-05-16 PROCEDURE — 87661 TRICHOMONAS VAGINALIS AMPLIF: CPT | Performed by: NURSE PRACTITIONER

## 2025-05-16 PROCEDURE — 86803 HEPATITIS C AB TEST: CPT

## 2025-05-16 PROCEDURE — 87340 HEPATITIS B SURFACE AG IA: CPT

## 2025-05-16 PROCEDURE — 86780 TREPONEMA PALLIDUM: CPT

## 2025-05-16 PROCEDURE — 87389 HIV-1 AG W/HIV-1&-2 AB AG IA: CPT

## 2025-05-16 PROCEDURE — 36415 COLL VENOUS BLD VENIPUNCTURE: CPT

## 2025-05-16 PROCEDURE — 99395 PREV VISIT EST AGE 18-39: CPT | Performed by: NURSE PRACTITIONER

## 2025-05-16 PROCEDURE — 87563 M. GENITALIUM AMP PROBE: CPT | Performed by: NURSE PRACTITIONER

## 2025-05-16 RX ORDER — FLUCONAZOLE 150 MG/1
150 TABLET ORAL
Qty: 2 TABLET | Refills: 0 | Status: SHIPPED | OUTPATIENT
Start: 2025-05-16 | End: 2025-05-20

## 2025-05-16 NOTE — PATIENT INSTRUCTIONS
Elizabeth por jerome confianza en nuestro equipo.   Le agradecemos y agradecemos edward comentarios.   Si recibe roberto encuesta nuestra, tómese unos momentos para informarnos cómo estamos.   Sinceramente,  ARGENTINA LucianoNP       OBESIDAD     Obesidad es cuando jerome índice de masa corporal (IMC) es superior a 30. Jerome Body mass index is 39.45 kg/m²..    Los riesgos de la obesidad incluyen  muchos problemas de reina, incluidas las lesiones y la discapacidad física. Es posible que deba realizarse exámenes para detectar lo siguiente:  Diabetes     Hipertensión o colesterol altoEnfermedades del corazón     Enfermedad cardíaca     Enfermedades del hígado o de la vesícula biliar     Cáncer de colon, de pecho, de próstata, de hígado o de riñón     El apnea del sueño     Artritis o gota    Busque atención médica de inmediato si:   Usted tiene un intenso dolor de clarke, confusión o dificultad para hablar.     Usted tiene debilidad en un lado del cuerpo.     Usted tiene dolor en el pecho, sudoración o falta de aire.    Pregúntele a jerome médico qué vitaminas y minerales son adecuados para usted.   Usted tiene síntomas de enfermedad de la vesícula biliar o el hígado, maikol dolor en la parte superior del abdomen.    Usted tiene dolor e incomodidad de rodillas o caderas al caminar.     Usted presenta síntomas de diabetes, maikol exceso de apetito y sed y micción frecuente.     Usted presenta síntomas de apnea de sueño, maikol roncar o tener sueño donte el día.     Usted tiene preguntas o inquietudes acerca de jerome condición o cuidado.    El tratamiento para la obesidad  se enfoca en ayudarle a bajar de peso para mejorar jerome reina. Incluso roberto reducción mínima del índice de masa corporal puede reducir el riesgo de muchos problemas de reina. Jerome médico lo ayudará a fijarse roberto meta para bajar de peso.  Cambios en el estilo de milla  son los primeros pasos para tratar la obesidad. Estos cambios incluyen shelby decisiones para consumir alimentos  saludables y realizar roberto actividad física con regularidad. El médico puede recomendarle un programa para bajar de peso que consta de capacitación, educación y terapia.     Medicamento  le pueden ayudar a bajar de peso cuando los utiliza en conjunto con roberto dieta y actividad física.     Cirugía  le puede ayudar a bajar de peso si usted es muy mirta y presenta otros problemas de reina. Existen varios tipos de cirugía para adelgazar. Pídale a jerome médico más información.    Cómo perder peso de forma exitosa:   Propóngase metas accesibles y realistas.  Un ejemplo de roberto meta accesible es caminar 20 minutos los 5 días de la semana. Otro objetivo puede ser perder 5% de jerome peso corporal.    Coméntele a edward amigos, familiares y compañeros de trabajo sobre edward metas  y solicite que lo apoyen. Convide a un amigo para hacer ejercicio juntos o acuda a un brian de motivación para bajar de peso.    Identifique los alimentos o situaciones que le pueden provocar que coma en exceso y busque formas para evitarlos. Deshágase de alimentos altos en calorías en jerome hogar o en el trabajo. En la cocina tenga roberto canasta con frutas frescas. Si el estrés es la causa para que usted coma más encuentre formas para sobrellevar el estrés.     Lleve un diario en el que registre lo que usted come y beka.  También escriba la cantidad de tiempo que pasa realizando roberto actividad física donte el día. Pésese roberto vez a la semana y anótelo en jerome diario.    Cambios en la alimentación:  Usted necesitará consumir menos de 500 a 1.000 calorías al día de las que usted actualmente consume para perder entre 1 a 2 libras a la semana. Los siguientes cambios le ayudarán a disminuir la cantidad de las calorías que normalmente consume:  Reduzca el tamaño de las porciones.  Utilice platos pequeños que no midan más de 9 pulgadas de diámetro. Llene la mitad del plato con frutas y verduras. Utilice las tazas medidoras para racionar los alimentos hasta que usted sepa maikol  se ve el tamaño de roberto porción.     Consuma 3 comidas y 1 o 2 meriendas al día.  Planee edward comidas con anterioridad. Cocine y coma en la casa todo el tiempo que le sea posible. Coma lentamente.     Consuma frutas y verduras con todas las comidas.  Las frutas y verduras son bajas en calorías y altas en fibra lo cual lo llena. No adicione mantequilla, ni margarina, ni salsas a base de crema a las verduras. Utilice las hierbas para sazonar las verduras al vapor.     Consuma menos grasas y alimentos fritos.  Consuma jaida o pescado al horno o la dionicio. Estas proteínas son más bajas en calorías y grasas que la carne magra. Limite las comidas rápidas. Es mejor que utilice aderezos para la ensalada a base de aceite de dennison y vinagre en vez de aderezos en botella.     Limite la cantidad de azúcar que consume.  No consuma bebidas azucaradas. Limite el consumo de alcohol.    Cambios de actividad:  La actividad física es buena para jerome cuerpo por muchas razones. Le ayuda a quemar calorías y fortalecer edward músculos. Disminuye el estrés y la depresión y mejora jerome estado de ánimo. Además puede ayudarle a dormir mejor. Consulte con jerome médico antes de empezar un régimen de ejercicios.  Realice roberto actividad física por lo menos por 30 minutos 5 días a la semana.  Empiece despacio. Aparte tiempo cada día para roberto actividad física que usted disfrute y que le sea conveniente. Es mejor realizar tanto un programa de pesas maikol roberto actividad para aumentar jerome ritmo cardíaco, maikol caminar, montar en bicicleta o nadar.     Busque formas para ser más activo.  Realice roberto actividad de jardinería y limpiar la casa. Suba las escaleras en vez de utilizar el elevador. En jerome tiempo megha concurra a eventos que le exijan caminar, maikol festivales y ferias al aire megha. Adicionar esta actividad física puede ayudarle a bajar y mantener el peso.    Acuda a edward consultas de control con jerome médico según le indicaron.  Puede que necesite consultar con  un dietista. Anote edward preguntas para que se acuerde de hacerlas donte edward visitas.

## 2025-05-16 NOTE — PROGRESS NOTES
ANNUAL GYNECOLOGICAL EXAMINATION    Homa Black is a 34 y.o. female who presents today for annual GYN exam.  Her last pap smear was performed 3/23/2022 and result was NILM with negative HPV.  She reports no history of abnormal pap smears in her past.  She had HIV screening performed 3/14/2024 and it was negative.  She reports menses as regular.  Patient's last menstrual period was 2025 (approximate).  Her general medical history has been reviewed and she reports it as follows:    Past Medical History:   Diagnosis Date    Anxiety     Depression     Empty sella syndrome (HCC)     Hyperlipidemia     Hypertension     Migraines     Prediabetes      Past Surgical History:   Procedure Laterality Date    NO PAST SURGERIES       OB History          2    Para   2    Term   2       0    AB   0    Living   2         SAB   0    IAB   0    Ectopic   0    Multiple   0    Live Births   2               Social History     Tobacco Use    Smoking status: Never     Passive exposure: Never    Smokeless tobacco: Never   Vaping Use    Vaping status: Never Used   Substance Use Topics    Alcohol use: Yes     Comment: couple times/month    Drug use: Never     Social History     Substance and Sexual Activity   Sexual Activity Yes    Partners: Male    Birth control/protection: None     Cancer-related family history includes Cancer in her sister. There is no history of Breast cancer, Colon cancer, or Ovarian cancer.    Current Outpatient Medications   Medication Instructions    Cholecalciferol (VITAMIN D3) 1,000 Units, Oral, Daily    ferrous sulfate 324 mg, Oral, Every other day    losartan (COZAAR) 25 mg, Oral, Daily       Review of Systems:  Review of Systems   Constitutional: Negative.    Gastrointestinal: Negative.    Genitourinary:  Negative for difficulty urinating, menstrual problem, pelvic pain and vaginal discharge.   Skin: Negative.        Physical Exam:  /98 (Patient Position: Sitting, Cuff Size:  "Standard)   Ht 5' 6\" (1.676 m)   Wt 111 kg (244 lb 6.4 oz)   LMP 05/02/2025 (Approximate)   BMI 39.45 kg/m²   Physical Exam  Constitutional:       General: She is not in acute distress.     Appearance: She is well-developed.   Genitourinary:      Vulva normal.      No lesions in the vagina.      Vulva exam comments: Erythema and excoriation of mucous membranes.      Vaginal discharge (white, adherent) and erythema present.        Right Adnexa: not tender and no mass present.     Left Adnexa: not tender and no mass present.     No cervical motion tenderness or lesion.      Uterus is not tender.   Breasts:     Right: No mass, nipple discharge, skin change or tenderness.      Left: No mass, nipple discharge, skin change or tenderness.   Neck:      Thyroid: No thyromegaly.     Cardiovascular:      Rate and Rhythm: Normal rate and regular rhythm.   Pulmonary:      Effort: Pulmonary effort is normal.   Abdominal:      Palpations: Abdomen is soft.      Tenderness: There is no abdominal tenderness.     Musculoskeletal:      Cervical back: Neck supple.     Neurological:      Mental Status: She is alert and oriented to person, place, and time.     Skin:     General: Skin is warm and dry.   Vitals reviewed.       Assessment/Plan:   1. Normal well-woman GYN exam.  2. Cervical cancer screening:  Normal cervical exam.  Pap smear done with HPV co-testing.  Has not received HPV vaccine in the past.  Offered vaccine series to patient now and she declines.     3. STD screening:  Orders placed for vaginal GC/CT, trichomonas/mycoplasma genitalium cultures.  Orders placed for serum anti-HIV, anti-HCV, HbsAg, syphilis panel.   4. Breast cancer screening:  Normal breast exam.  Reviewed breast self-awareness.   5. Depression Screening: Patient's depression screening was assessed with a PHQ-2 score of 4. Their PHQ-9 score was 14. Patient assessed for underlying major depression. They have no active suicidal ideations. Brief counseling " provided and recommend additional follow-up/re-evaluation next office visit.  Referral placed for  consultation.   6. BMI Counseling: Body mass index is 39.45 kg/m². Discussed the patient's BMI with her. The BMI is above normal. Nutrition recommendations include reducing portion sizes and decreasing overall calorie intake.   7. Contraception:  Declines.   8. Vaginal Candidiasis:  Given Rx Diflucan.   9. Return to office in 1 year for annual GYN exam.    Reviewed with patient that [facility], but that they will not necessarily be reviewed by me immediately.  Explained that I will review results at my earliest opportunity and contact patient appropriately.

## 2025-05-17 LAB
C TRACH DNA SPEC QL NAA+PROBE: NEGATIVE
HBV SURFACE AG SER QL: NORMAL
HCV AB SER QL: NORMAL
HIV 1+2 AB+HIV1 P24 AG SERPL QL IA: NORMAL
N GONORRHOEA DNA SPEC QL NAA+PROBE: NEGATIVE
TREPONEMA PALLIDUM IGG+IGM AB [PRESENCE] IN SERUM OR PLASMA BY IMMUNOASSAY: NORMAL

## 2025-05-19 ENCOUNTER — TELEPHONE (OUTPATIENT)
Dept: OBGYN CLINIC | Facility: CLINIC | Age: 35
End: 2025-05-19

## 2025-05-19 ENCOUNTER — RESULTS FOLLOW-UP (OUTPATIENT)
Dept: OBGYN CLINIC | Facility: CLINIC | Age: 35
End: 2025-05-19

## 2025-05-19 LAB
HPV HR 12 DNA CVX QL NAA+PROBE: NEGATIVE
HPV16 DNA CVX QL NAA+PROBE: NEGATIVE
HPV18 DNA CVX QL NAA+PROBE: NEGATIVE
M GENITALIUM DNA SPEC QL NAA+PROBE: NEGATIVE
T VAGINALIS DNA SPEC QL NAA+PROBE: NEGATIVE

## 2025-05-21 LAB
LAB AP GYN PRIMARY INTERPRETATION: NORMAL
Lab: NORMAL
PATH INTERP SPEC-IMP: NORMAL

## 2025-05-22 ENCOUNTER — PATIENT OUTREACH (OUTPATIENT)
Dept: OBGYN CLINIC | Facility: CLINIC | Age: 35
End: 2025-05-22

## 2025-05-22 NOTE — PROGRESS NOTES
ANETA CASILLAS used  service ID [id number] to contact pt to address needs for depression. Pt is a 35yo   female primary language is Burkinan.     Pt reports a hx of depression and expressed many mood changes recently. Pt has family supports lives with her  and children. Pt denies SI and SIB. Pt reports to feel safe in her home. She denies financial difficulties and GENEVIEVE. ANETA CASILLAS educated pt about therapy services in her area. Pt is interested in having ongoing support in therapy.     ANETA CASILLAS contacted [facility] to assist in pt scheduling an intake appointment.     ANETA CASILLAS will f/u with pt next week regarding scheduling.   
No

## 2025-05-29 ENCOUNTER — TELEPHONE (OUTPATIENT)
Dept: OBGYN CLINIC | Facility: CLINIC | Age: 35
End: 2025-05-29

## 2025-05-29 ENCOUNTER — PATIENT OUTREACH (OUTPATIENT)
Dept: OBGYN CLINIC | Facility: CLINIC | Age: 35
End: 2025-05-29

## 2025-05-29 NOTE — PROGRESS NOTES
ANETA CASILLAS used  service ID [id number] to f/u with pt     Pt reports having a missed call regarding scheduling an appointment. ANETA CASILLAS informed pt that it was from [facility] to schedule for an intake. Pt reported understanding. ANETA CASILLAS called [facility] asking for them to contact pt to schedule intake.     ANETA CASILLAS will f/u with pt next week.

## 2025-05-29 NOTE — TELEPHONE ENCOUNTER
Pt came into follow up with SW in regards to seeking help with depression. Pt asked if she can please get a call back.

## 2025-06-05 ENCOUNTER — OFFICE VISIT (OUTPATIENT)
Dept: FAMILY MEDICINE CLINIC | Facility: CLINIC | Age: 35
End: 2025-06-05

## 2025-06-05 VITALS
BODY MASS INDEX: 39.7 KG/M2 | HEART RATE: 97 BPM | TEMPERATURE: 98.6 F | WEIGHT: 247 LBS | SYSTOLIC BLOOD PRESSURE: 128 MMHG | OXYGEN SATURATION: 97 % | RESPIRATION RATE: 18 BRPM | DIASTOLIC BLOOD PRESSURE: 74 MMHG | HEIGHT: 66 IN

## 2025-06-05 DIAGNOSIS — D50.8 IRON DEFICIENCY ANEMIA SECONDARY TO INADEQUATE DIETARY IRON INTAKE: ICD-10-CM

## 2025-06-05 DIAGNOSIS — I10 PRIMARY HYPERTENSION: Primary | ICD-10-CM

## 2025-06-05 DIAGNOSIS — Z00.00 ANNUAL PHYSICAL EXAM: ICD-10-CM

## 2025-06-05 PROCEDURE — 99395 PREV VISIT EST AGE 18-39: CPT | Performed by: FAMILY MEDICINE

## 2025-06-05 RX ORDER — LOSARTAN POTASSIUM 25 MG/1
25 TABLET ORAL DAILY
Qty: 90 TABLET | Refills: 0 | Status: SHIPPED | OUTPATIENT
Start: 2025-06-05

## 2025-06-05 RX ORDER — FERROUS SULFATE 324(65)MG
324 TABLET, DELAYED RELEASE (ENTERIC COATED) ORAL EVERY OTHER DAY
Qty: 45 TABLET | Refills: 0 | Status: SHIPPED | OUTPATIENT
Start: 2025-06-05 | End: 2025-06-08

## 2025-06-05 NOTE — ASSESSMENT & PLAN NOTE
Well-controlled  On losartan 25 mg daily  Continue current regimen  DASH diet and regular exercise recommended    Orders:    losartan (COZAAR) 25 mg tablet; Take 1 tablet (25 mg total) by mouth daily

## 2025-06-05 NOTE — PROGRESS NOTES
Adult Annual Physical  Name: Homa Black      : 1990      MRN: [id number]  Encounter Provider: Ching Perla MD  Encounter Date: 2025   Encounter department: [facility]    :  Assessment & Plan  Primary hypertension  Well-controlled  On losartan 25 mg daily  Continue current regimen  DASH diet and regular exercise recommended    Orders:    losartan (COZAAR) 25 mg tablet; Take 1 tablet (25 mg total) by mouth daily    Annual physical exam             Preventive Screenings:  - Diabetes Screening: screening up-to-date  - Cholesterol Screening: screening not indicated and has hyperlipidemia   - Hepatitis C screening: screening up-to-date   - HIV screening: screening up-to-date   - Cervical cancer screening: screening up-to-date   - Colon cancer screening: screening up-to-date   - Lung cancer screening: screening not indicated     Immunizations:  - Immunizations due: Tdap    Counseling/Anticipatory Guidance:  - Alcohol: discussed moderation in alcohol intake and recommendations for healthy alcohol use.   - Drug use: discussed harms of illicit drug use and how it can negatively impact mental/physical health.   - Tobacco use: discussed harms of tobacco use and management options for quitting.   - Dental health: discussed importance of regular tooth brushing, flossing, and dental visits.   - Diet: discussed recommendations for a healthy/well-balanced diet.   - Exercise: the importance of regular exercise/physical activity was discussed. Recommend exercise 3-5 times per week for at least 30 minutes.          History of Present Illness     Adult Annual Physical:  Patient presents for annual physical.     Diet and Physical Activity:  - Diet/Nutrition: no special diet.  - Exercise: no formal exercise. plans on joing the gym with  this week    Depression Screening:    - PHQ-9 Score: 0    General Health:  - Sleep: 4-6 hours of sleep on average.  - Hearing:  "normal hearing bilateral ears.  - Vision: wears glasses and most recent eye exam < 1 year ago.  - Dental: regular dental visits, brushes teeth three times daily, no dental visits for > 1 year and does not floss.    /GYN Health:  - Follows with GYN: no.   - Menopause: perimenopausal.   - Last menstrual cycle: 5/22/2025.   - History of STDs: no  - Contraception: barrier methods.      Review of Systems   Constitutional:  Negative for fatigue and fever.   Eyes:  Negative for visual disturbance.   Respiratory:  Negative for cough and shortness of breath.    Cardiovascular:  Negative for chest pain.   Gastrointestinal:  Negative for abdominal pain.   Endocrine: Negative for polydipsia, polyphagia and polyuria.   Genitourinary:  Negative for dysuria and hematuria.   Skin:  Negative for rash.   Neurological:  Negative for dizziness, numbness and headaches.   Psychiatric/Behavioral:  Negative for sleep disturbance. The patient is not nervous/anxious.          Objective   /74 (BP Location: Left arm, Patient Position: Sitting, Cuff Size: Large)   Pulse 97   Temp 98.6 °F (37 °C) (Temporal)   Resp 18   Ht 5' 6\" (1.676 m)   Wt 112 kg (247 lb)   LMP 05/22/2025 (Approximate)   SpO2 97%   BMI 39.87 kg/m²     Physical Exam  Constitutional:       General: She is not in acute distress.     Appearance: She is not ill-appearing.   HENT:      Head: Normocephalic and atraumatic.      Right Ear: Tympanic membrane and external ear normal. There is no impacted cerumen.      Left Ear: Tympanic membrane and external ear normal. There is no impacted cerumen.      Nose: Nose normal.      Mouth/Throat:      Mouth: Mucous membranes are moist.      Pharynx: Oropharynx is clear. No oropharyngeal exudate or posterior oropharyngeal erythema.     Eyes:      General: No scleral icterus.     Conjunctiva/sclera: Conjunctivae normal.       Cardiovascular:      Rate and Rhythm: Normal rate and regular rhythm.      Pulses: Normal pulses.      " Heart sounds: No murmur heard.     No gallop.   Pulmonary:      Effort: Pulmonary effort is normal. No respiratory distress.      Breath sounds: No wheezing or rhonchi.   Abdominal:      General: Bowel sounds are normal. There is no distension.      Tenderness: There is no abdominal tenderness. There is no guarding.     Musculoskeletal:      Cervical back: Normal range of motion.      Right lower leg: No edema.      Left lower leg: No edema.     Skin:     General: Skin is warm and dry.      Capillary Refill: Capillary refill takes less than 2 seconds.     Neurological:      General: No focal deficit present.      Mental Status: She is alert and oriented to person, place, and time.      Motor: No weakness.      Gait: Gait normal.     Psychiatric:         Mood and Affect: Mood normal.         Behavior: Behavior normal.

## 2025-06-05 NOTE — ASSESSMENT & PLAN NOTE
Orders:    ferrous sulfate 324 (65 Fe) mg; Take 1 tablet (324 mg total) by mouth every other day

## 2025-06-19 ENCOUNTER — PATIENT OUTREACH (OUTPATIENT)
Dept: OBGYN CLINIC | Facility: CLINIC | Age: 35
End: 2025-06-19

## 2025-06-19 NOTE — PROGRESS NOTES
ANETA CASILLAS used  service ID [id number] to contact pt to f/u.     ANETA CASILLAS inquired about [facility] and if a therapy appointment was scheduled. Pt reports yes that she has had 2 appointments and her next is this Friday. ANETA CASILLAS inquired how appointments have been and if its helpful. Pt reports that she has helped her a lot so far and is looking forward to additional appointments. ANETA CASILLAS inquired about additional support. Pt declined. Pt declined SI and SIB and reported to be safe.     ANETA CASILLAS encouraged pt to contact ANETA CASILLAS for additional support if needed. ANETA CASILLAS will otherwise close this referral at this time.

## 2025-08-13 ENCOUNTER — HOSPITAL ENCOUNTER (EMERGENCY)
Facility: HOSPITAL | Age: 35
Discharge: HOME | End: 2025-08-13
Attending: EMERGENCY MEDICINE | Admitting: EMERGENCY MEDICINE